# Patient Record
Sex: MALE | Race: WHITE | Employment: OTHER | URBAN - METROPOLITAN AREA
[De-identification: names, ages, dates, MRNs, and addresses within clinical notes are randomized per-mention and may not be internally consistent; named-entity substitution may affect disease eponyms.]

---

## 2017-08-31 PROBLEM — I10 ESSENTIAL HYPERTENSION, BENIGN: Status: ACTIVE | Noted: 2017-08-31

## 2017-08-31 PROBLEM — R06.09 DYSPNEA ON EXERTION: Status: ACTIVE | Noted: 2017-08-31

## 2017-08-31 PROBLEM — R07.9 CHEST PAIN, UNSPECIFIED: Status: ACTIVE | Noted: 2017-08-31

## 2017-12-01 PROBLEM — E66.01 OBESITY, MORBID (HCC): Status: ACTIVE | Noted: 2017-12-01

## 2018-04-19 ENCOUNTER — HOSPITAL ENCOUNTER (OUTPATIENT)
Age: 63
Setting detail: OBSERVATION
Discharge: HOME | DRG: 313 | End: 2018-04-20
Attending: INTERNAL MEDICINE | Admitting: INTERNAL MEDICINE
Payer: COMMERCIAL

## 2018-04-19 PROBLEM — M54.50 CHRONIC LOW BACK PAIN: Status: ACTIVE | Noted: 2018-04-19

## 2018-04-19 PROBLEM — R07.9 CHEST PAIN: Status: ACTIVE | Noted: 2018-04-19

## 2018-04-19 PROBLEM — G89.29 CHRONIC LOW BACK PAIN: Status: ACTIVE | Noted: 2018-04-19

## 2018-04-19 LAB
ATRIAL RATE: 76 BPM
CALCULATED P AXIS, ECG09: 18 DEGREES
CALCULATED R AXIS, ECG10: 55 DEGREES
CALCULATED T AXIS, ECG11: 61 DEGREES
D DIMER PPP FEU-MCNC: <0.27 UG/ML(FEU)
DIAGNOSIS, 93000: NORMAL
GLUCOSE BLD STRIP.AUTO-MCNC: 231 MG/DL (ref 65–100)
GLUCOSE BLD STRIP.AUTO-MCNC: 435 MG/DL (ref 65–100)
P-R INTERVAL, ECG05: 172 MS
Q-T INTERVAL, ECG07: 362 MS
QRS DURATION, ECG06: 88 MS
QTC CALCULATION (BEZET), ECG08: 407 MS
TROPONIN I SERPL-MCNC: <0.02 NG/ML (ref 0.02–0.05)
TROPONIN I SERPL-MCNC: <0.02 NG/ML (ref 0.02–0.05)
VENTRICULAR RATE, ECG03: 76 BPM

## 2018-04-19 PROCEDURE — 82962 GLUCOSE BLOOD TEST: CPT

## 2018-04-19 PROCEDURE — 99218 HC RM OBSERVATION: CPT

## 2018-04-19 PROCEDURE — 74011250637 HC RX REV CODE- 250/637: Performed by: NURSE PRACTITIONER

## 2018-04-19 PROCEDURE — 36415 COLL VENOUS BLD VENIPUNCTURE: CPT | Performed by: NURSE PRACTITIONER

## 2018-04-19 PROCEDURE — 85730 THROMBOPLASTIN TIME PARTIAL: CPT | Performed by: INTERNAL MEDICINE

## 2018-04-19 PROCEDURE — 74011636637 HC RX REV CODE- 636/637: Performed by: NURSE PRACTITIONER

## 2018-04-19 PROCEDURE — 83036 HEMOGLOBIN GLYCOSYLATED A1C: CPT | Performed by: INTERNAL MEDICINE

## 2018-04-19 PROCEDURE — 65660000000 HC RM CCU STEPDOWN

## 2018-04-19 PROCEDURE — 74011250636 HC RX REV CODE- 250/636: Performed by: NURSE PRACTITIONER

## 2018-04-19 PROCEDURE — 93005 ELECTROCARDIOGRAM TRACING: CPT | Performed by: NURSE PRACTITIONER

## 2018-04-19 PROCEDURE — 84484 ASSAY OF TROPONIN QUANT: CPT | Performed by: NURSE PRACTITIONER

## 2018-04-19 PROCEDURE — 85379 FIBRIN DEGRADATION QUANT: CPT | Performed by: NURSE PRACTITIONER

## 2018-04-19 RX ORDER — MORPHINE SULFATE 4 MG/ML
2 INJECTION, SOLUTION INTRAMUSCULAR; INTRAVENOUS
Status: DISCONTINUED | OUTPATIENT
Start: 2018-04-19 | End: 2018-04-20 | Stop reason: HOSPADM

## 2018-04-19 RX ORDER — LISINOPRIL 20 MG/1
20 TABLET ORAL DAILY
Status: DISCONTINUED | OUTPATIENT
Start: 2018-04-20 | End: 2018-04-20 | Stop reason: HOSPADM

## 2018-04-19 RX ORDER — HEPARIN SODIUM 5000 [USP'U]/100ML
12-25 INJECTION, SOLUTION INTRAVENOUS
Status: DISCONTINUED | OUTPATIENT
Start: 2018-04-19 | End: 2018-04-20 | Stop reason: HOSPADM

## 2018-04-19 RX ORDER — ATORVASTATIN CALCIUM 40 MG/1
40 TABLET, FILM COATED ORAL
Status: DISCONTINUED | OUTPATIENT
Start: 2018-04-19 | End: 2018-04-20 | Stop reason: HOSPADM

## 2018-04-19 RX ORDER — GABAPENTIN 300 MG/1
600 CAPSULE ORAL 4 TIMES DAILY
Status: DISCONTINUED | OUTPATIENT
Start: 2018-04-19 | End: 2018-04-19

## 2018-04-19 RX ORDER — TAMSULOSIN HYDROCHLORIDE 0.4 MG/1
0.4 CAPSULE ORAL DAILY
Status: DISCONTINUED | OUTPATIENT
Start: 2018-04-20 | End: 2018-04-20 | Stop reason: HOSPADM

## 2018-04-19 RX ORDER — HEPARIN SODIUM 5000 [USP'U]/ML
4000 INJECTION, SOLUTION INTRAVENOUS; SUBCUTANEOUS ONCE
Status: COMPLETED | OUTPATIENT
Start: 2018-04-19 | End: 2018-04-19

## 2018-04-19 RX ORDER — ZOLPIDEM TARTRATE 5 MG/1
5 TABLET ORAL
Status: DISCONTINUED | OUTPATIENT
Start: 2018-04-19 | End: 2018-04-20 | Stop reason: HOSPADM

## 2018-04-19 RX ORDER — METOPROLOL TARTRATE 25 MG/1
25 TABLET, FILM COATED ORAL EVERY 6 HOURS
Status: DISCONTINUED | OUTPATIENT
Start: 2018-04-19 | End: 2018-04-20 | Stop reason: HOSPADM

## 2018-04-19 RX ORDER — PANTOPRAZOLE SODIUM 40 MG/1
40 TABLET, DELAYED RELEASE ORAL DAILY
Status: DISCONTINUED | OUTPATIENT
Start: 2018-04-20 | End: 2018-04-20 | Stop reason: HOSPADM

## 2018-04-19 RX ORDER — METFORMIN HYDROCHLORIDE 1000 MG/1
1 TABLET, FILM COATED, EXTENDED RELEASE ORAL
COMMUNITY
End: 2019-03-14

## 2018-04-19 RX ORDER — SODIUM CHLORIDE 0.9 % (FLUSH) 0.9 %
5-10 SYRINGE (ML) INJECTION AS NEEDED
Status: DISCONTINUED | OUTPATIENT
Start: 2018-04-19 | End: 2018-04-20 | Stop reason: HOSPADM

## 2018-04-19 RX ORDER — GUAIFENESIN 100 MG/5ML
81 LIQUID (ML) ORAL DAILY
Status: DISCONTINUED | OUTPATIENT
Start: 2018-04-20 | End: 2018-04-20 | Stop reason: HOSPADM

## 2018-04-19 RX ORDER — SODIUM CHLORIDE 0.9 % (FLUSH) 0.9 %
5-10 SYRINGE (ML) INJECTION EVERY 8 HOURS
Status: DISCONTINUED | OUTPATIENT
Start: 2018-04-19 | End: 2018-04-20 | Stop reason: HOSPADM

## 2018-04-19 RX ORDER — GABAPENTIN 400 MG/1
800 CAPSULE ORAL 4 TIMES DAILY
Status: DISCONTINUED | OUTPATIENT
Start: 2018-04-19 | End: 2018-04-20 | Stop reason: HOSPADM

## 2018-04-19 RX ORDER — GLIPIZIDE 5 MG/1
10 TABLET ORAL
Status: DISCONTINUED | OUTPATIENT
Start: 2018-04-19 | End: 2018-04-20 | Stop reason: HOSPADM

## 2018-04-19 RX ORDER — TIZANIDINE 2 MG/1
4 TABLET ORAL
Status: DISCONTINUED | OUTPATIENT
Start: 2018-04-19 | End: 2018-04-20 | Stop reason: HOSPADM

## 2018-04-19 RX ORDER — NITROGLYCERIN 0.4 MG/1
0.4 TABLET SUBLINGUAL
Status: DISCONTINUED | OUTPATIENT
Start: 2018-04-19 | End: 2018-04-20 | Stop reason: HOSPADM

## 2018-04-19 RX ORDER — INSULIN LISPRO 100 [IU]/ML
INJECTION, SOLUTION INTRAVENOUS; SUBCUTANEOUS
Status: DISCONTINUED | OUTPATIENT
Start: 2018-04-19 | End: 2018-04-20 | Stop reason: HOSPADM

## 2018-04-19 RX ORDER — GUAIFENESIN 100 MG/5ML
81 LIQUID (ML) ORAL DAILY
Status: DISCONTINUED | OUTPATIENT
Start: 2018-04-20 | End: 2018-04-19

## 2018-04-19 RX ORDER — MORPHINE SULFATE 4 MG/ML
2 INJECTION, SOLUTION INTRAMUSCULAR; INTRAVENOUS
Status: DISCONTINUED | OUTPATIENT
Start: 2018-04-19 | End: 2018-04-19 | Stop reason: SDUPTHER

## 2018-04-19 RX ORDER — SODIUM CHLORIDE 9 MG/ML
75 INJECTION, SOLUTION INTRAVENOUS CONTINUOUS
Status: DISCONTINUED | OUTPATIENT
Start: 2018-04-19 | End: 2018-04-20 | Stop reason: HOSPADM

## 2018-04-19 RX ORDER — AMLODIPINE BESYLATE 5 MG/1
5 TABLET ORAL
Status: DISCONTINUED | OUTPATIENT
Start: 2018-04-20 | End: 2018-04-20 | Stop reason: HOSPADM

## 2018-04-19 RX ORDER — ZOLPIDEM TARTRATE 10 MG/1
10 TABLET ORAL
COMMUNITY
End: 2020-03-12 | Stop reason: SDUPTHER

## 2018-04-19 RX ORDER — OXYCODONE HYDROCHLORIDE 15 MG/1
30 TABLET ORAL
Status: DISCONTINUED | OUTPATIENT
Start: 2018-04-19 | End: 2018-04-20 | Stop reason: HOSPADM

## 2018-04-19 RX ORDER — INSULIN LISPRO 100 [IU]/ML
5 INJECTION, SOLUTION INTRAVENOUS; SUBCUTANEOUS ONCE
Status: COMPLETED | OUTPATIENT
Start: 2018-04-19 | End: 2018-04-19

## 2018-04-19 RX ORDER — ESZOPICLONE 3 MG/1
3 TABLET, FILM COATED ORAL
Status: DISCONTINUED | OUTPATIENT
Start: 2018-04-19 | End: 2018-04-19

## 2018-04-19 RX ORDER — ALBUTEROL SULFATE 90 UG/1
1 AEROSOL, METERED RESPIRATORY (INHALATION)
Status: DISCONTINUED | OUTPATIENT
Start: 2018-04-19 | End: 2018-04-20 | Stop reason: HOSPADM

## 2018-04-19 RX ADMIN — ATORVASTATIN CALCIUM 40 MG: 40 TABLET, FILM COATED ORAL at 21:44

## 2018-04-19 RX ADMIN — INSULIN LISPRO 5 UNITS: 100 INJECTION, SOLUTION INTRAVENOUS; SUBCUTANEOUS at 23:03

## 2018-04-19 RX ADMIN — Medication 10 ML: at 15:50

## 2018-04-19 RX ADMIN — Medication 10 ML: at 15:21

## 2018-04-19 RX ADMIN — HEPARIN SODIUM 4000 UNITS: 5000 INJECTION, SOLUTION INTRAVENOUS; SUBCUTANEOUS at 17:36

## 2018-04-19 RX ADMIN — GABAPENTIN 600 MG: 300 CAPSULE ORAL at 17:27

## 2018-04-19 RX ADMIN — NITROGLYCERIN 1 INCH: 20 OINTMENT TOPICAL at 23:04

## 2018-04-19 RX ADMIN — OXYCODONE HYDROCHLORIDE 30 MG: 15 TABLET ORAL at 23:03

## 2018-04-19 RX ADMIN — OXYCODONE HYDROCHLORIDE 30 MG: 15 TABLET ORAL at 17:24

## 2018-04-19 RX ADMIN — HEPARIN SODIUM AND DEXTROSE 12 UNITS/KG/HR: 5000; 5 INJECTION INTRAVENOUS at 17:43

## 2018-04-19 RX ADMIN — INSULIN LISPRO 4 UNITS: 100 INJECTION, SOLUTION INTRAVENOUS; SUBCUTANEOUS at 18:26

## 2018-04-19 RX ADMIN — Medication 5 ML: at 21:45

## 2018-04-19 RX ADMIN — METOPROLOL TARTRATE 25 MG: 25 TABLET ORAL at 17:27

## 2018-04-19 RX ADMIN — SODIUM CHLORIDE 75 ML/HR: 900 INJECTION, SOLUTION INTRAVENOUS at 15:16

## 2018-04-19 RX ADMIN — ZOLPIDEM TARTRATE 5 MG: 5 TABLET ORAL at 23:03

## 2018-04-19 RX ADMIN — INSULIN LISPRO 10 UNITS: 100 INJECTION, SOLUTION INTRAVENOUS; SUBCUTANEOUS at 23:04

## 2018-04-19 RX ADMIN — NITROGLYCERIN 1 INCH: 20 OINTMENT TOPICAL at 17:32

## 2018-04-19 RX ADMIN — METOPROLOL TARTRATE 25 MG: 25 TABLET ORAL at 23:03

## 2018-04-19 RX ADMIN — GABAPENTIN 800 MG: 400 CAPSULE ORAL at 21:44

## 2018-04-19 NOTE — PROGRESS NOTES
TRANSFER - IN REPORT    Direct admit to 320. Patient connected to monitor and assessment completed. Plan of care reviewed. Patient oriented to room and call light. Patient aware to use call light to communicate any chest pain or needs. Admission skin assessment completed with second RN and reveals the following: skin intact to sacrum and heels bilaterally without open areas or redness.

## 2018-04-19 NOTE — IP AVS SNAPSHOT
[address] 
[phone] Patient: Sherry Rodríguez MRN: [id number] TKN:9/78/2301 A check dante indicates which time of day the medication should be taken. My Medications CHANGE how you take these medications Instructions Each Dose to Equal  
 Morning Noon Evening Bedtime  
 pantoprazole 40 mg tablet Commonly known as:  PROTONIX What changed:  when to take this Take 1 Tab by mouth daily. 40 mg  
    
  
   
   
   
  
 tamsulosin 0.4 mg capsule Commonly known as:  FLOMAX What changed:  See the new instructions. Your next dose is:  4- TAKE 1 CAPSULE DAILY CONTINUE taking these medications Instructions Each Dose to Equal  
 Morning Noon Evening Bedtime  
 albuterol 90 mcg/actuation inhaler Commonly known as:  PROVENTIL HFA, VENTOLIN HFA, PROAIR HFA Take 1 Puff by inhalation every four (4) hours as needed for Wheezing. 1 Puff AMBIEN 10 mg tablet Generic drug:  zolpidem Take 10 mg by mouth nightly. Per patient statement. 10 mg  
    
   
   
   
  
  
 amLODIPine 5 mg tablet Commonly known as:  Eleanor Bowman Take 5 mg by mouth every morning. Take day of surgery per anesthesia protocol. Indications: HYPERTENSION  
 5 mg  
    
  
   
   
   
  
 atorvastatin 40 mg tablet Commonly known as:  LIPITOR Take 20 mg by mouth daily. 20 mg  
    
   
   
   
  
  
 eszopiclone 3 mg tablet Commonly known as:  Oley Erm Take 3 mg by mouth daily. 3 mg FISH OIL CONCENTRATE PO Take 1 Cap by mouth every morning. Stop seven days prior to surgery per anesthesia protocol. 1 Cap  
    
   
   
   
  
 gabapentin 300 mg capsule Commonly known as:  NEURONTIN Take 800 mg by mouth four (4) times daily.   
 800 mg  
    
  
   
  
   
  
   
  
  
 glipiZIDE 10 mg tablet Commonly known as:  Raj Gauze Take 10 mg by mouth daily. 10 mg  
    
  
   
   
   
  
 hydroCHLOROthiazide 12.5 mg tablet Commonly known as:  HYDRODIURIL Take 12.5 mg by mouth daily. 12.5 mg  
    
  
   
   
   
  
 lisinopril 20 mg tablet Commonly known as:  Debi Desanctis Take 20 mg by mouth daily. Indications: hypertension 20 mg  
    
  
   
   
   
  
 metFORMIN 1,000 mg Tg24 24 hour tablet Commonly known as:  Malika Mckinney Your next dose is:  4-22- 2018 Take 1 Tab by mouth Daily (before dinner). 1 Tab MULTIPLE VITAMIN PO Take 1 Tab by mouth daily. 1 Tab  
    
   
   
   
  
 oxyCODONE IR 20 mg immediate release tablet Commonly known as:  Ahsan Espinal Your last dose was:  4- at 3:34 Take 30 mg by mouth every six (6) hours as needed. 30 mg  
    
   
   
   
  
  
 vitamin e 400 unit Tab Take 1 Tab by mouth every morning. Stop seven days prior to surgery per anesthesia protocol. 1 Tab

## 2018-04-19 NOTE — PROGRESS NOTES
Called pharmacy to get  Medication approved to start heaprin gtt. Spoke to Oony. Awaiting pharmacy to approve medications.

## 2018-04-19 NOTE — PROGRESS NOTES
Bedside and verbal shift report given to Levi Vargas RN by Dominica Severe, RN. Opportunity for questions given. Oncoming RN assumes all patient care.

## 2018-04-19 NOTE — IP AVS SNAPSHOT
[address] 
[phone] Patient: Geovani Cruz MRN: [id number] GWE:[id number]/[id number] About your hospitalization You were admitted on:  April 19, 2018 You last received care in the:  [facility] 3 TELEMETRY You were discharged on:  April 20, 2018 Why you were hospitalized Your primary diagnosis was:  Chest Pain, Unspecified Your diagnoses also included:  Obesity (Bmi 30-39.9), Gerd (Gastroesophageal Reflux Disease), Type 2 Diabetes Mellitus (Hcc), High Cholesterol, Essential Hypertension, Benign, Chronic Low Back Pain, Chest Pain Follow-up Information Follow up With Details Comments Contact Info Kayla Ahumada MD Schedule an appointment as soon as possible for a visit in 2 weeks  [address] 
[phone] Discharge Orders None A check dante indicates which time of day the medication should be taken. My Medications CHANGE how you take these medications Instructions Each Dose to Equal  
 Morning Noon Evening Bedtime  
 pantoprazole 40 mg tablet Commonly known as:  PROTONIX What changed:  when to take this Take 1 Tab by mouth daily. 40 mg  
    
  
   
   
   
  
 tamsulosin 0.4 mg capsule Commonly known as:  FLOMAX What changed:  See the new instructions. Your next dose is:  4- TAKE 1 CAPSULE DAILY CONTINUE taking these medications Instructions Each Dose to Equal  
 Morning Noon Evening Bedtime  
 albuterol 90 mcg/actuation inhaler Commonly known as:  PROVENTIL HFA, VENTOLIN HFA, PROAIR HFA Take 1 Puff by inhalation every four (4) hours as needed for Wheezing. 1 Puff AMBIEN 10 mg tablet Generic drug:  zolpidem Take 10 mg by mouth nightly. Per patient statement. 10 mg  
    
   
   
   
  
  
 amLODIPine 5 mg tablet Commonly known as:  Sara Pall Take 5 mg by mouth every morning. Take day of surgery per anesthesia protocol. Indications: HYPERTENSION  
 5 mg  
    
  
   
   
   
  
 atorvastatin 40 mg tablet Commonly known as:  LIPITOR Take 20 mg by mouth daily. 20 mg  
    
   
   
   
  
  
 eszopiclone 3 mg tablet Commonly known as:  James Bear Take 3 mg by mouth daily. 3 mg FISH OIL CONCENTRATE PO Take 1 Cap by mouth every morning. Stop seven days prior to surgery per anesthesia protocol. 1 Cap  
    
   
   
   
  
 gabapentin 300 mg capsule Commonly known as:  NEURONTIN Take 800 mg by mouth four (4) times daily. 800 mg  
    
  
   
  
   
  
   
  
  
 glipiZIDE 10 mg tablet Commonly known as:  Aldean Bina Take 10 mg by mouth daily. 10 mg  
    
  
   
   
   
  
 hydroCHLOROthiazide 12.5 mg tablet Commonly known as:  HYDRODIURIL Take 12.5 mg by mouth daily. 12.5 mg  
    
  
   
   
   
  
 lisinopril 20 mg tablet Commonly known as:  Helon Estrin Take 20 mg by mouth daily. Indications: hypertension 20 mg  
    
  
   
   
   
  
 metFORMIN 1,000 mg Tg24 24 hour tablet Commonly known as:  Jordyn Linen Your next dose is:  4-22- 2018 Take 1 Tab by mouth Daily (before dinner). 1 Tab MULTIPLE VITAMIN PO Take 1 Tab by mouth daily. 1 Tab  
    
   
   
   
  
 oxyCODONE IR 20 mg immediate release tablet Commonly known as:  Juan Carlos Melena Your last dose was:  4- at 3:34 Take 30 mg by mouth every six (6) hours as needed. 30 mg  
    
   
   
   
  
  
 vitamin e 400 unit Tab Take 1 Tab by mouth every morning. Stop seven days prior to surgery per anesthesia protocol. 1 Tab Opioid Education  Prescription Opioids: What You Need to Know: 
 
Prescription opioids can be used to help relieve moderate-to-severe pain and are often prescribed following a surgery or injury, or for certain health conditions. These medications can be an important part of treatment but also come with serious risks. Opioids are strong pain medicines. Examples include hydrocodone, oxycodone, fentanyl, and morphine. Heroin is an example of an illegal opioid. It is important to work with your health care provider to make sure you are getting the safest, most effective care. WHAT ARE THE RISKS AND SIDE EFFECTS OF OPIOID USE? Prescription opioids carry serious risks of addiction and overdose, especially with prolonged use. An opioid overdose, often marked by slow breathing, can cause sudden death. The use of prescription opioids can have a number of side effects as well, even when taken as directed. · Tolerance-meaning you might need to take more of a medication for the same pain relief · Physical dependence-meaning you have symptoms of withdrawal when the medication is stopped. Withdrawal symptoms can include nausea, sweating, chills, diarrhea, stomach cramps, and muscle aches. Withdrawal can last up to several weeks, depending on which drug you took and how long you took it. · Increased sensitivity to pain · Constipation · Nausea, vomiting, and dry mouth · Sleepiness and dizziness · Confusion · Depression · Low levels of testosterone that can result in lower sex drive, energy, and strength · Itching and sweating RISKS ARE GREATER WITH:      
· History of drug misuse, substance use disorder, or overdose · Mental health conditions (such as depression or anxiety) · Sleep apnea · Older age (72 years or older) · Pregnancy Avoid alcohol while taking prescription opioids. Also, unless specifically advised by your health care provider, medications to avoid include: · Benzodiazepines (such as Xanax or Valium) · Muscle relaxants (such as Soma or Flexeril) · Hypnotics (such as Ambien or Lunesta) · Other prescription opioids KNOW YOUR OPTIONS Talk to your health care provider about ways to manage your pain that don't involve prescription opioids. Some of these options may actually work better and have fewer risks and side effects. Options may include: 
· Pain relievers such as acetaminophen, ibuprofen, and naproxen · Some medications that are also used for depression or seizures · Physical therapy and exercise · Counseling to help patients learn how to cope better with triggers of pain and stress. · Application of heat or cold compress · Massage therapy · Relaxation techniques Be Informed Make sure you know the name of your medication, how much and how often to take it, and its potential risks & side effects. IF YOU ARE PRESCRIBED OPIOIDS FOR PAIN: 
· Never take opioids in greater amounts or more often than prescribed. Remember the goal is not to be pain-free but to manage your pain at a tolerable level. · Follow up with your primary care provider to: · Work together to create a plan on how to manage your pain. · Talk about ways to help manage your pain that don't involve prescription opioids. · Talk about any and all concerns and side effects. · Help prevent misuse and abuse. · Never sell or share prescription opioids · Help prevent misuse and abuse. · Store prescription opioids in a secure place and out of reach of others (this may include visitors, children, friends, and family). · Safely dispose of unused/unwanted prescription opioids: Find your community drug take-back program or your pharmacy mail-back program, or flush them down the toilet, following guidance from the Food and Drug Administration (www.fda.gov/Drugs/ResourcesForYou). · Visit www.cdc.gov/drugoverdose to learn about the risks of opioid abuse and overdose. · If you believe you may be struggling with addiction, tell your health care provider and ask for guidance or call [employer] at 1-397-326-FLKU. Discharge Instructions Cardiac Catheterization/Angiography Discharge Instructions *Check the puncture site frequently for swelling or bleeding. If you see any bleeding, lie down and apply pressure over the area with a clean town or washcloth. Notify your doctor for any redness, swelling, drainage or oozing from the puncture site. Notify your doctor for any fever or chills. *If the leg or arm with the puncture becomes cold, numb or painful, [facility] at  [phone]. *Activity should be limited for the next 48 hours. Climb stairs as little as possible and avoid any stooping, bending or strenuous activity for 48 hours. No heavy lifting (anything over 10 pounds) for three days. *Do not drive for 48 hours. *You may resume your usual diet. Drink more fluids than usual. 
 
*Have a responsible person drive you home and stay with you for at least 24 hours after your heart catheterization/angiography. *You may remove the bandage from your Right and Arm in 24 hours. You may shower in 24 hours. No tub baths, hot tubs or swimming for one week. Do not place any lotions, creams, powders, ointments over the puncture site for one week. You may place a clean band-aid over the puncture site each day for 5 days. Change this daily. [employer] Announcement We are excited to announce that we are making your provider's discharge notes available to you in Peak[employer]. You will see these notes when they are completed and signed by the physician that discharged you from your recent hospital stay. If you have any questions or concerns about any information you see in PeakStream, please call the Health Information Department where you were seen or reach out to your Primary Care Provider for more information about your plan of care. Introducing \[facility]\"" & HEALTH SERVICES! Yessy Heller introduces Wantr patient portal. Now you can access parts of your medical record, email your doctor's office, and request medication refills online. 1. In your internet browser, go to [email or web address] 2. Click on the First Time User? Click Here link in the Sign In box. You will see the New Member Sign Up page. 3. Enter your Wantr Access Code exactly as it appears below. You will not need to use this code after youve completed the sign-up process. If you do not sign up before the expiration date, you must request a new code. · Wantr Access Code: [email or web address] Expires: 7/18/2018  9:15 AM 
 
4. Enter the last four digits of your Social Security Number (xxxx) and Date of Birth (mm/dd/yyyy) as indicated and click Submit. You will be taken to the next sign-up page. 5. Create a Wantr ID. This will be your Wantr login ID and cannot be changed, so think of one that is secure and easy to remember. 6. Create a Wantr password. You can change your password at any time. 7. Enter your Password Reset Question and Answer. This can be used at a later time if you forget your password. 8. Enter your e-mail address. You will receive e-mail notification when new information is available in [address] 9. Click Sign Up. You can now view and download portions of your medical record. 10. Click the Download Summary menu link to download a portable copy of your medical information. If you have questions, please visit the Frequently Asked Questions section of the Wantr website. Remember, Wantr is NOT to be used for urgent needs. For medical emergencies, dial 911. Now available from your iPhone and Android! Introducing Moi Arce As a Yessy Heller patient, I wanted to make you aware of our electronic visit tool called Moi Arce. Yessy Heller 24/7 allows you to connect within minutes with a medical provider 24 hours a day, seven days a week via a mobile device or tablet or logging into a secure website from your computer. You can access [employer] from anywhere in the [place]. A virtual visit might be right for you when you have a simple condition and feel like you just dont want to get out of bed, or cant get away from work for an appointment, when your regular [facility] provider is not available (evenings, weekends or holidays), or when youre out of town and need minor care. Electronic visits cost only $49 and if the [employer] 24/7 provider determines a prescription is needed to treat your condition, one can be electronically transmitted to a nearby pharmacy*. Please take a moment to enroll today if you have not already done so. The enrollment process is free and takes just a few minutes. To enroll, please download the [employer] hilda to your tablet or phone, or visit [email or web address] to enroll on your computer. And, as an [address] [place] [address] patient with a [employer] account, the results of your visits will be scanned into your electronic medical record and your primary care provider will be able to view the scanned results. We urge you to continue to see your regular [facility] provider for your ongoing medical care. And while your primary care provider may not be the one available when you seek a Moi Matthewagustinafin virtual visit, the peace of mind you get from getting a real diagnosis real time can be priceless. For more information on AddressHealthagustinafin, view our Frequently Asked Questions (FAQs) at [email or web address]. Sincerely, 
 
Efrain Rodney MD 
Chief Medical Officer [address] *:  certain medications cannot be prescribed via [facility]i [employer] Providers Seen During Your Hospitalization Provider Specialty Primary office phone Juan Blank MD Cardiology [phone] Your Primary Care Physician (PCP) Primary Care Physician Office Phone Office Fax Stacy Méndez [phone] You are allergic to the following No active allergies Recent Documentation Height Weight BMI Smoking Status 1.778 m 129.1 kg 40.85 kg/m2 Former Smoker Emergency Contacts Name Discharge Info Relation Home Work Mobile Brittney Johnson  Other Relative [6] [phone] Jonathan Doan [5] [phone] Patient Belongings The following personal items are in your possession at time of discharge: 
  Dental Appliances: Uppers, With patient  Visual Aid: Glasses, With patient, At bedside      Home Medications: None   Jewelry: None  Clothing: At bedside    Other Valuables: Cell Phone, Eyeglasses Please provide this summary of care documentation to your next provider. Signatures-by signing, you are acknowledging that this After Visit Summary has been reviewed with you and you have received a copy. Patient Signature:  ____________________________________________________________ Date:  ____________________________________________________________  
  
[facility] Provider Signature:  ____________________________________________________________ Date:  ____________________________________________________________

## 2018-04-20 ENCOUNTER — APPOINTMENT (OUTPATIENT)
Dept: CT IMAGING | Age: 63
DRG: 313 | End: 2018-04-20
Attending: INTERNAL MEDICINE
Payer: COMMERCIAL

## 2018-04-20 VITALS
TEMPERATURE: 97.8 F | HEIGHT: 70 IN | HEART RATE: 84 BPM | BODY MASS INDEX: 40.76 KG/M2 | WEIGHT: 284.7 LBS | RESPIRATION RATE: 11 BRPM | SYSTOLIC BLOOD PRESSURE: 122 MMHG | DIASTOLIC BLOOD PRESSURE: 75 MMHG | OXYGEN SATURATION: 96 %

## 2018-04-20 LAB
ANION GAP SERPL CALC-SCNC: 8 MMOL/L (ref 7–16)
APTT PPP: 32.5 SEC (ref 23.2–35.3)
APTT PPP: 35 SEC (ref 23.2–35.3)
APTT PPP: 39.7 SEC (ref 23.2–35.3)
BASOPHILS # BLD: 0.1 K/UL (ref 0–0.2)
BASOPHILS NFR BLD: 1 % (ref 0–2)
BUN SERPL-MCNC: 11 MG/DL (ref 8–23)
CALCIUM SERPL-MCNC: 8.5 MG/DL (ref 8.3–10.4)
CHLORIDE SERPL-SCNC: 103 MMOL/L (ref 98–107)
CHOLEST SERPL-MCNC: 130 MG/DL
CO2 SERPL-SCNC: 30 MMOL/L (ref 21–32)
CREAT SERPL-MCNC: 0.79 MG/DL (ref 0.8–1.5)
DIFFERENTIAL METHOD BLD: ABNORMAL
EOSINOPHIL # BLD: 0.2 K/UL (ref 0–0.8)
EOSINOPHIL NFR BLD: 3 % (ref 0.5–7.8)
ERYTHROCYTE [DISTWIDTH] IN BLOOD BY AUTOMATED COUNT: 12.9 % (ref 11.9–14.6)
EST. AVERAGE GLUCOSE BLD GHB EST-MCNC: 235 MG/DL
GLUCOSE BLD STRIP.AUTO-MCNC: 256 MG/DL (ref 65–100)
GLUCOSE BLD STRIP.AUTO-MCNC: 269 MG/DL (ref 65–100)
GLUCOSE SERPL-MCNC: 248 MG/DL (ref 65–100)
HBA1C MFR BLD: 9.8 % (ref 4.8–6)
HCT VFR BLD AUTO: 39.7 % (ref 41.1–50.3)
HDLC SERPL-MCNC: 32 MG/DL (ref 40–60)
HDLC SERPL: 4.1 {RATIO}
HGB BLD-MCNC: 13.3 G/DL (ref 13.6–17.2)
IMM GRANULOCYTES # BLD: 0 K/UL (ref 0–0.5)
IMM GRANULOCYTES NFR BLD AUTO: 0 % (ref 0–5)
LDLC SERPL CALC-MCNC: 29.2 MG/DL
LIPID PROFILE,FLP: ABNORMAL
LYMPHOCYTES # BLD: 2.4 K/UL (ref 0.5–4.6)
LYMPHOCYTES NFR BLD: 35 % (ref 13–44)
MCH RBC QN AUTO: 29.1 PG (ref 26.1–32.9)
MCHC RBC AUTO-ENTMCNC: 33.5 G/DL (ref 31.4–35)
MCV RBC AUTO: 86.9 FL (ref 79.6–97.8)
MONOCYTES # BLD: 0.5 K/UL (ref 0.1–1.3)
MONOCYTES NFR BLD: 6 % (ref 4–12)
NEUTS SEG # BLD: 3.8 K/UL (ref 1.7–8.2)
NEUTS SEG NFR BLD: 55 % (ref 43–78)
PLATELET # BLD AUTO: 212 K/UL (ref 150–450)
PMV BLD AUTO: 11.4 FL (ref 10.8–14.1)
POTASSIUM SERPL-SCNC: 3.9 MMOL/L (ref 3.5–5.1)
RBC # BLD AUTO: 4.57 M/UL (ref 4.23–5.67)
SODIUM SERPL-SCNC: 141 MMOL/L (ref 136–145)
TRIGL SERPL-MCNC: 344 MG/DL (ref 35–150)
TROPONIN I SERPL-MCNC: <0.02 NG/ML (ref 0.02–0.05)
VLDLC SERPL CALC-MCNC: 68.8 MG/DL (ref 6–23)
WBC # BLD AUTO: 7 K/UL (ref 4.3–11.1)

## 2018-04-20 PROCEDURE — 93458 L HRT ARTERY/VENTRICLE ANGIO: CPT

## 2018-04-20 PROCEDURE — 99153 MOD SED SAME PHYS/QHP EA: CPT

## 2018-04-20 PROCEDURE — 85025 COMPLETE CBC W/AUTO DIFF WBC: CPT | Performed by: NURSE PRACTITIONER

## 2018-04-20 PROCEDURE — 74011250637 HC RX REV CODE- 250/637: Performed by: NURSE PRACTITIONER

## 2018-04-20 PROCEDURE — 71260 CT THORAX DX C+: CPT

## 2018-04-20 PROCEDURE — 74011636320 HC RX REV CODE- 636/320: Performed by: INTERNAL MEDICINE

## 2018-04-20 PROCEDURE — 74011250636 HC RX REV CODE- 250/636: Performed by: NURSE PRACTITIONER

## 2018-04-20 PROCEDURE — 74011000250 HC RX REV CODE- 250: Performed by: INTERNAL MEDICINE

## 2018-04-20 PROCEDURE — C1894 INTRO/SHEATH, NON-LASER: HCPCS

## 2018-04-20 PROCEDURE — C8929 TTE W OR WO FOL WCON,DOPPLER: HCPCS

## 2018-04-20 PROCEDURE — 74011000258 HC RX REV CODE- 258: Performed by: INTERNAL MEDICINE

## 2018-04-20 PROCEDURE — 74011250636 HC RX REV CODE- 250/636: Performed by: INTERNAL MEDICINE

## 2018-04-20 PROCEDURE — 99218 HC RM OBSERVATION: CPT

## 2018-04-20 PROCEDURE — 77030019569 HC BND COMPR RAD TERU -B

## 2018-04-20 PROCEDURE — 74011636637 HC RX REV CODE- 636/637: Performed by: NURSE PRACTITIONER

## 2018-04-20 PROCEDURE — 85730 THROMBOPLASTIN TIME PARTIAL: CPT | Performed by: INTERNAL MEDICINE

## 2018-04-20 PROCEDURE — 77030004534 HC CATH ANGI DX INFN CARD -A

## 2018-04-20 PROCEDURE — 36415 COLL VENOUS BLD VENIPUNCTURE: CPT | Performed by: NURSE PRACTITIONER

## 2018-04-20 PROCEDURE — 99152 MOD SED SAME PHYS/QHP 5/>YRS: CPT

## 2018-04-20 PROCEDURE — C1769 GUIDE WIRE: HCPCS

## 2018-04-20 PROCEDURE — 80061 LIPID PANEL: CPT | Performed by: NURSE PRACTITIONER

## 2018-04-20 PROCEDURE — 80048 BASIC METABOLIC PNL TOTAL CA: CPT | Performed by: NURSE PRACTITIONER

## 2018-04-20 PROCEDURE — 74011250636 HC RX REV CODE- 250/636

## 2018-04-20 PROCEDURE — 77030015766

## 2018-04-20 PROCEDURE — 82962 GLUCOSE BLOOD TEST: CPT

## 2018-04-20 RX ORDER — MIDAZOLAM HYDROCHLORIDE 1 MG/ML
.5-2 INJECTION, SOLUTION INTRAMUSCULAR; INTRAVENOUS
Status: DISCONTINUED | OUTPATIENT
Start: 2018-04-20 | End: 2018-04-20 | Stop reason: HOSPADM

## 2018-04-20 RX ORDER — SODIUM CHLORIDE 0.9 % (FLUSH) 0.9 %
10 SYRINGE (ML) INJECTION
Status: COMPLETED | OUTPATIENT
Start: 2018-04-20 | End: 2018-04-20

## 2018-04-20 RX ORDER — SODIUM CHLORIDE 0.9 % (FLUSH) 0.9 %
5-10 SYRINGE (ML) INJECTION AS NEEDED
Status: DISCONTINUED | OUTPATIENT
Start: 2018-04-20 | End: 2018-04-20 | Stop reason: HOSPADM

## 2018-04-20 RX ORDER — SODIUM CHLORIDE 9 MG/ML
75 INJECTION, SOLUTION INTRAVENOUS CONTINUOUS
Status: DISCONTINUED | OUTPATIENT
Start: 2018-04-20 | End: 2018-04-20 | Stop reason: HOSPADM

## 2018-04-20 RX ORDER — SODIUM CHLORIDE 0.9 % (FLUSH) 0.9 %
5-10 SYRINGE (ML) INJECTION EVERY 8 HOURS
Status: DISCONTINUED | OUTPATIENT
Start: 2018-04-20 | End: 2018-04-20 | Stop reason: HOSPADM

## 2018-04-20 RX ORDER — FENTANYL CITRATE 50 UG/ML
25-75 INJECTION, SOLUTION INTRAMUSCULAR; INTRAVENOUS
Status: DISCONTINUED | OUTPATIENT
Start: 2018-04-20 | End: 2018-04-20 | Stop reason: HOSPADM

## 2018-04-20 RX ORDER — HEPARIN SODIUM 200 [USP'U]/100ML
3 INJECTION, SOLUTION INTRAVENOUS CONTINUOUS
Status: DISCONTINUED | OUTPATIENT
Start: 2018-04-20 | End: 2018-04-20 | Stop reason: HOSPADM

## 2018-04-20 RX ORDER — HEPARIN SODIUM 5000 [USP'U]/ML
35 INJECTION, SOLUTION INTRAVENOUS; SUBCUTANEOUS ONCE
Status: COMPLETED | OUTPATIENT
Start: 2018-04-20 | End: 2018-04-20

## 2018-04-20 RX ORDER — LIDOCAINE HYDROCHLORIDE 20 MG/ML
2-10 INJECTION, SOLUTION INFILTRATION; PERINEURAL
Status: DISCONTINUED | OUTPATIENT
Start: 2018-04-20 | End: 2018-04-20 | Stop reason: HOSPADM

## 2018-04-20 RX ADMIN — LISINOPRIL 20 MG: 20 TABLET ORAL at 08:15

## 2018-04-20 RX ADMIN — PERFLUTREN 1 ML: 6.52 INJECTION, SUSPENSION INTRAVENOUS at 09:00

## 2018-04-20 RX ADMIN — SODIUM CHLORIDE 75 ML/HR: 900 INJECTION, SOLUTION INTRAVENOUS at 05:43

## 2018-04-20 RX ADMIN — Medication 10 ML: at 15:37

## 2018-04-20 RX ADMIN — NITROGLYCERIN 1 INCH: 20 OINTMENT TOPICAL at 05:43

## 2018-04-20 RX ADMIN — GABAPENTIN 800 MG: 400 CAPSULE ORAL at 08:14

## 2018-04-20 RX ADMIN — METOPROLOL TARTRATE 25 MG: 25 TABLET ORAL at 05:43

## 2018-04-20 RX ADMIN — OXYCODONE HYDROCHLORIDE 30 MG: 15 TABLET ORAL at 15:34

## 2018-04-20 RX ADMIN — HEPARIN SODIUM 4550 UNITS: 5000 INJECTION, SOLUTION INTRAVENOUS; SUBCUTANEOUS at 02:14

## 2018-04-20 RX ADMIN — Medication 10 ML: at 15:03

## 2018-04-20 RX ADMIN — FENTANYL CITRATE 25 MCG: 50 INJECTION, SOLUTION INTRAMUSCULAR; INTRAVENOUS at 13:14

## 2018-04-20 RX ADMIN — SODIUM CHLORIDE 100 ML: 900 INJECTION, SOLUTION INTRAVENOUS at 15:03

## 2018-04-20 RX ADMIN — PANTOPRAZOLE SODIUM 40 MG: 40 TABLET, DELAYED RELEASE ORAL at 08:15

## 2018-04-20 RX ADMIN — VERAPAMIL HYDROCHLORIDE 2 ML: 2.5 INJECTION INTRAVENOUS at 13:25

## 2018-04-20 RX ADMIN — GABAPENTIN 800 MG: 400 CAPSULE ORAL at 15:34

## 2018-04-20 RX ADMIN — ASPIRIN 81 MG 81 MG: 81 TABLET ORAL at 08:15

## 2018-04-20 RX ADMIN — IOPAMIDOL 100 ML: 755 INJECTION, SOLUTION INTRAVENOUS at 13:37

## 2018-04-20 RX ADMIN — AMLODIPINE BESYLATE 5 MG: 5 TABLET ORAL at 08:15

## 2018-04-20 RX ADMIN — LIDOCAINE HYDROCHLORIDE 60 MG: 20 INJECTION, SOLUTION INFILTRATION; PERINEURAL at 13:23

## 2018-04-20 RX ADMIN — Medication 10 ML: at 05:47

## 2018-04-20 RX ADMIN — HEPARIN SODIUM 3 UNITS/HR: 5000 INJECTION, SOLUTION INTRAVENOUS; SUBCUTANEOUS at 13:08

## 2018-04-20 RX ADMIN — GLIPIZIDE 10 MG: 5 TABLET ORAL at 15:43

## 2018-04-20 RX ADMIN — MIDAZOLAM HYDROCHLORIDE 2 MG: 1 INJECTION, SOLUTION INTRAMUSCULAR; INTRAVENOUS at 13:14

## 2018-04-20 RX ADMIN — OXYCODONE HYDROCHLORIDE 30 MG: 15 TABLET ORAL at 06:25

## 2018-04-20 RX ADMIN — INSULIN LISPRO 6 UNITS: 100 INJECTION, SOLUTION INTRAVENOUS; SUBCUTANEOUS at 15:43

## 2018-04-20 RX ADMIN — IOPAMIDOL 100 ML: 755 INJECTION, SOLUTION INTRAVENOUS at 15:03

## 2018-04-20 NOTE — PROCEDURES
Brief Cardiac Procedure Note    Patient: Izaiah Fritz MRN: [id number]  SSN: [id number]    YOB: 1955  Age: 66 y.o.   Sex: male      Date of Procedure: 4/20/2018     Pre-procedure Diagnosis: Chest pain CCS Class IV    Post-procedure Diagnosis: Coronary Artery Disease    Reason for Procedure: Suspected CAD    Procedure: Left Heart Catheterization    Brief Description of Procedure: LHC    Performed By: Eugenia Berry MD     Assistants: NONE    Anesthesia: Moderate Sedation    Estimated Blood Loss: Less than 10 mL      Specimens: None    Implants: None    Findings:   LV NORMAL  CORS MINIMAL IRREGS AT MOST  RIGHT RADIAL    Complications: None    Recommendations: CONTINUE BID PPI AND KEEP GI FOLLOW UP    Signed By: Eugenia Berry MD     April 20, 2018

## 2018-04-20 NOTE — PROGRESS NOTES
4/20/2018            RE: Cristela. [address]              To Whom It May Concern,      Due to medical reasons, Sesar Moeller may  return to full duty, no restrictions on Monday April 23rd, 2018.         Sincerely,          Josy Spencer NP

## 2018-04-20 NOTE — PROCEDURES
[address] KIMMY  MR#: [id number]  : 1955  ACCOUNT #: [de-identified]   DATE OF SERVICE: 2018    PRIMARY CARE PHYSICIAN:  Dr. Ani Perea. REASON FOR THE PROCEDURE:  Recurrent substernal chest pain with radiation to left arm highly suspicious for underlying coronary artery disease with dynamic ST and T-wave changes inferiorly with negative serial troponins. PROCEDURE PERFORMED:  Left heart catheterization with coronary angiography and left ventriculogram.      TOTAL CONTRAST:  100 mL of Isovue. CONSCIOUS SEDATION:  The patient was sedated by Su Soto with 2 mg Versed and 25 mcg fentanyl and monitored from 1314 to 1143 without complication. Frailty score is 3. PROCEDURAL TECHNIQUE:  After informed consent was obtained, the patient was brought to the cath lab, prepped and draped in the usual fashion. A 6-Maori sheath was placed in the right radial artery using a micropuncture modified Seldinger technique and left heart catheterization performed using standard 5-Maori angled pigtail and Tiger catheters without complications. Manual pressure will be applied to the patient's access site via TR band protocol. There were no complications. PRESSURE RESULTS:  Left ventricle 100/10-15, aorta 100/75. LEFT VENTRICULOGRAM:  Reveals normal left ventricular regional wall motion with ejection fraction greater than 55%. There is no mitral regurgitation and there is no aortic valve gradient on catheter pullback. Left ventricular end diastolic pressure is high normal to mildly elevated. CORONARY ANATOMY:  Left main has minimal irregularity dividing into an LAD and circumflex in the usual fashion. The LAD wraps around the apex of the left ventricle, but becomes very small caliber after the takeoff of a large mid vessel diagonal branch.   There are minimal irregularities up to 20% in the mid LAD just at the takeoff of the septal  #1. However, the remainder of the LAD as well as the entire diagonal have no significant disease. The circumflex is a fairly large bifurcating system, which has minimal luminal irregularities. The right coronary artery is a large anatomically dominant vessel with mild irregularities proximally and in the mid vessel up to 10-20%. The distal right coronary artery had fairly large but tortuous posterior descending and posterolateral branches have at most minimal luminal irregularity. CONCLUSION:  1. No significant coronary disease of hemodynamic significance. 2.  Normal left ventricular regional wall motion and ejection fraction. 3.  Noncardiac chest pain. The patient is already taking twice daily proton pump inhibitor therapy and has outpatient gastroenterology followup.       Katie Delgado MD       ATS / DN  D: 04/20/2018 13:49     T: 04/20/2018 17:38  JOB #: [id number]  CC: Michoacano Ramirez MD  [facility]  [address]  [place] [address]

## 2018-04-20 NOTE — DISCHARGE INSTRUCTIONS
Cardiac Catheterization/Angiography Discharge Instructions    *Check the puncture site frequently for swelling or bleeding. If you see any bleeding, lie down and apply pressure over the area with a clean town or washcloth. Notify your doctor for any redness, swelling, drainage or oozing from the puncture site. Notify your doctor for any fever or chills. *If the leg or arm with the puncture becomes cold, numb or painful, [facility] at  [phone]. *Activity should be limited for the next 48 hours. Climb stairs as little as possible and avoid any stooping, bending or strenuous activity for 48 hours. No heavy lifting (anything over 10 pounds) for three days. *Do not drive for 48 hours. *You may resume your usual diet. Drink more fluids than usual.    *Have a responsible person drive you home and stay with you for at least 24 hours after your heart catheterization/angiography. *You may remove the bandage from your Right and Arm in 24 hours. You may shower in 24 hours. No tub baths, hot tubs or swimming for one week. Do not place any lotions, creams, powders, ointments over the puncture site for one week. You may place a clean band-aid over the puncture site each day for 5 days. Change this daily.

## 2018-04-20 NOTE — PROGRESS NOTES
Bedside shift change report given to self (oncoming nurse) by Debbie Patino (offgoing nurse). Report included the following information SBAR.

## 2018-04-20 NOTE — ROUTINE PROCESS
TRANSFER - OUT REPORT:    [facility]  Dr. Darshan Pride  RRA access    Versed 2mg, fentanyl 25mcg  Diagnostic cath, medical management  TR band placed @1340 with 12ml air    Verbal report given to [facility] Demetrius RN(name) on [facility]  being transferred to [facility](unit) for routine progression of care       Report consisted of patients Situation, Background, Assessment and   Recommendations(SBAR). Information from the following report(s) Procedure Summary was reviewed with the receiving nurse. Lines:   Peripheral IV 04/19/18 Left Antecubital (Active)   Site Assessment Clean, dry, & intact 4/20/2018  4:54 AM   Phlebitis Assessment 0 4/20/2018  4:54 AM   Infiltration Assessment 0 4/20/2018  4:54 AM   Dressing Status Clean, dry, & intact 4/20/2018  4:54 AM   Dressing Type Tape;Transparent 4/20/2018  4:54 AM   Hub Color/Line Status Flushed;Patent; Infusing 4/20/2018  4:54 AM   Alcohol Cap Used No 4/20/2018  4:54 AM        Opportunity for questions and clarification was provided.       Patient transported with:   [facility]

## 2018-04-20 NOTE — PROGRESS NOTES
Discharge instructions reviewed with patient. Prescriptions given for no new meds and med info sheets provided for all new medications. Opportunity for questions provided. Patient voiced understanding of all discharge instructions. IVs removed and montior off at discharge by primary RN.

## 2018-04-20 NOTE — PROGRESS NOTES
Bedside and verbal shift report given to Richi Blackmon RN by Jordana Robles RN. Opportunity for questions given. Oncoming RN assumes all patient care.

## 2018-04-20 NOTE — ROUTINE PROCESS
Cath Lab Transfer In    Transferred from tele  Transferred to : Cath Lab Procedure Room 1  Reason for Transfer: [address] [place] PCI    Attending physician: Jose Ramon      Patient Assessment    Patient received into procedure room. No acute distress noted or verbalized. Procedural prep completed. Iv accesses assessed for patency. Review of pertinent labs and allergies completed. Noted presence of current History & Physical, updated within the previous 24 hours. Consent signed.

## 2018-04-20 NOTE — DISCHARGE SUMMARY
Physician Discharge Summary     Patient ID:  June Santos  [id number]  22 y.o.  1955    Admit date: 4/19/2018    Discharge date and time: 4/20/18    Admitting Physician: Pearl Ribeiro MD     Primary Cardiologist:karen Obrien MD    Discharge Physician: Fifi Augustine NP    Admission Diagnoses: chest pain  Chest pain  Chest pain    Discharge Diagnoses:   Patient Active Problem List    Diagnosis Date Noted    Chest pain 04/19/2018    Chronic low back pain 04/19/2018    Obesity, morbid ([facility] [address] 12/01/2017    Chest pain, unspecified 08/31/2017    Dyspnea on exertion 08/31/2017    Essential hypertension, benign 08/31/2017    Hypocalcemia 12/20/2015    Obesity (BMI 30-39.9) 12/19/2015    Sleep apnea 12/19/2015    Type 2 diabetes mellitus ([facility] [address] 12/19/2015    Spinal stenosis of lumbar region with neurogenic claudication 12/19/2015    Spinal stenosis, lumbar region, with neurogenic claudication 10/13/2015    GERD (gastroesophageal reflux disease)     High cholesterol     BPH (benign prostatic hypertrophy)            Hospital Course: June Santos was admitted to the hospital on 4/19/2018 with complaints of chest pain. Serial cardiac enzymes were negative for myocardial infarction. Cardiac catheterization revealed no evidence of obstructive CAD. He also underwent CT chest which was negative for pulmonary embolism. He will be discharged home after bedrest and follow up with PCP in 2-4 weeks. DISPOSITION: The patient is being discharged to home on a low saturated fat, low cholesterol diet. Pt is instructed to abstain from any heavy lifting, straining, stooping or driving for 5 days. Pt is instructed to watch groin site ( if groin access was performed) for bleeding/oozing. If so,pt is instructed to apply firm pressure with clean cloth and call office at [phone].  Pt is instructed to watch for signs of infection which include increasing area of redness around site, fever/hot to touch or purulent drainage. Pt is instructed not to soak in a tub bath for 1 week, but it is okay to shower. Discharge Exam:     Visit Vitals    /85    Pulse 76    Temp 97.6 °F (36.4 °C)    Resp 11    Ht 5' 10\" (1.778 m)    Wt 129.1 kg (284 lb 11.2 oz)    SpO2 96%    BMI 40.85 kg/m2     General Appearance:  Well developed, well nourished,alert and oriented x 3, and individual in no acute distress. Ears/Nose/Mouth/Throat:   Hearing grossly normal.         Neck: Supple. Chest:   Lungs clear to auscultation bilaterally. Cardiovascular:  Regular rate and rhythm, S1, S2 normal, no murmur. Abdomen:   Soft, non-tender, bowel sounds are active. Extremities: No edema bilaterally. Skin: Warm and dry. Final Laboratory Data:  Recent Results (from the past 24 hour(s))   EKG, 12 LEAD, INITIAL    Collection Time: 04/19/18  3:10 PM   Result Value Ref Range    Ventricular Rate 76 BPM    Atrial Rate 76 BPM    P-R Interval 172 ms    QRS Duration 88 ms    Q-T Interval 362 ms    QTC Calculation (Bezet) 407 ms    Calculated P Axis 18 degrees    Calculated R Axis 55 degrees    Calculated T Axis 61 degrees    Diagnosis       Normal sinus rhythm  Normal ECG  When compared with ECG of 19-DEC-2015 11:30,  Sinus rhythm has replaced Atrial fibrillation  Vent.  rate has decreased BY  42 BPM  QRS duration has decreased  T wave inversion no longer evident in Inferior leads  Confirmed by Jann Moeller MD (), PILY MARROQUIN ([id number]) on 4/19/2018 3:48:10 PM     GLUCOSE, POC    Collection Time: 04/19/18  4:03 PM   Result Value Ref Range    Glucose (POC) 231 (H) 65 - 100 mg/dL   TROPONIN I    Collection Time: 04/19/18  6:36 PM   Result Value Ref Range    Troponin-I, Qt. <0.02 (L) 0.02 - 0.05 NG/ML   D DIMER    Collection Time: 04/19/18  6:36 PM   Result Value Ref Range    D DIMER <0.27 <0.56 ug/ml(FEU)   GLUCOSE, POC    Collection Time: 04/19/18  9:47 PM   Result Value Ref Range    Glucose (POC) 435 (H) 65 - 100 mg/dL   TROPONIN I    Collection Time: 04/19/18 10:33 PM   Result Value Ref Range    Troponin-I, Qt. <0.02 (L) 0.02 - 0.05 NG/ML   TROPONIN I    Collection Time: 04/19/18 11:48 PM   Result Value Ref Range    Troponin-I, Qt. <0.02 (L) 0.02 - 0.05 NG/ML   PTT    Collection Time: 04/19/18 11:48 PM   Result Value Ref Range    aPTT 32.5 23.2 - 35.3 SEC   HEMOGLOBIN A1C WITH EAG    Collection Time: 04/19/18 11:48 PM   Result Value Ref Range    Hemoglobin A1c 9.8 (H) 4.8 - 6.0 %    Est. average glucose 235 mg/dL   LIPID PANEL    Collection Time: 04/20/18  3:50 AM   Result Value Ref Range    LIPID PROFILE          Cholesterol, total 130 <200 MG/DL    Triglyceride 344 (H) 35 - 150 MG/DL    HDL Cholesterol 32 (L) 40 - 60 MG/DL    LDL, calculated 29.2 <100 MG/DL    VLDL, calculated 68.8 (H) 6.0 - 23.0 MG/DL    CHOL/HDL Ratio 4.1     METABOLIC PANEL, BASIC    Collection Time: 04/20/18  3:50 AM   Result Value Ref Range    Sodium 141 136 - 145 mmol/L    Potassium 3.9 3.5 - 5.1 mmol/L    Chloride 103 98 - 107 mmol/L    CO2 30 21 - 32 mmol/L    Anion gap 8 7 - 16 mmol/L    Glucose 248 (H) 65 - 100 mg/dL    BUN 11 8 - 23 MG/DL    Creatinine 0.79 (L) 0.8 - 1.5 MG/DL    GFR est AA >60 >60 ml/min/1.73m2    GFR est non-AA >60 >60 ml/min/1.73m2    Calcium 8.5 8.3 - 10.4 MG/DL   CBC WITH AUTOMATED DIFF    Collection Time: 04/20/18  3:50 AM   Result Value Ref Range    WBC 7.0 4.3 - 11.1 K/uL    RBC 4.57 4.23 - 5.67 M/uL    HGB 13.3 (L) 13.6 - 17.2 g/dL    HCT 39.7 (L) 41.1 - 50.3 %    MCV 86.9 79.6 - 97.8 FL    MCH 29.1 26.1 - 32.9 PG    MCHC 33.5 31.4 - 35.0 g/dL    RDW 12.9 11.9 - 14.6 %    PLATELET 939 944 - 415 K/uL    MPV 11.4 10.8 - 14.1 FL    DF AUTOMATED      NEUTROPHILS 55 43 - 78 %    LYMPHOCYTES 35 13 - 44 %    MONOCYTES 6 4.0 - 12.0 %    EOSINOPHILS 3 0.5 - 7.8 %    BASOPHILS 1 0.0 - 2.0 %    IMMATURE GRANULOCYTES 0 0.0 - 5.0 %    ABS. NEUTROPHILS 3.8 1.7 - 8.2 K/UL    ABS.  LYMPHOCYTES 2.4 0.5 - 4.6 K/UL ABS. MONOCYTES 0.5 0.1 - 1.3 K/UL    ABS. EOSINOPHILS 0.2 0.0 - 0.8 K/UL    ABS. BASOPHILS 0.1 0.0 - 0.2 K/UL    ABS. IMM. GRANS. 0.0 0.0 - 0.5 K/UL   PTT    Collection Time: 04/20/18  8:22 AM   Result Value Ref Range    aPTT 39.7 (H) 23.2 - 35.3 SEC   PTT    Collection Time: 04/20/18 11:52 AM   Result Value Ref Range    aPTT 35.0 23.2 - 35.3 SEC   GLUCOSE, POC    Collection Time: 04/20/18 12:07 PM   Result Value Ref Range    Glucose (POC) 269 (H) 65 - 100 mg/dL       Disposition: home    Patient Instructions:   Current Discharge Medication List      CONTINUE these medications which have NOT CHANGED    Details   metFORMIN (GLUMETZA) 1,000 mg TG24 24 hour tablet Take 1 Tab by mouth Daily (before dinner). zolpidem (AMBIEN) 10 mg tablet Take 10 mg by mouth nightly. Per patient statement. tamsulosin (FLOMAX) 0.4 mg capsule TAKE 1 CAPSULE DAILY  Qty: 90 Cap, Refills: 3      albuterol (PROVENTIL HFA, VENTOLIN HFA, PROAIR HFA) 90 mcg/actuation inhaler Take 1 Puff by inhalation every four (4) hours as needed for Wheezing. Qty: 1 Inhaler, Refills: 0      pantoprazole (PROTONIX) 40 mg tablet Take 1 Tab by mouth daily. Qty: 90 Tab, Refills: 1      atorvastatin (LIPITOR) 40 mg tablet Take 20 mg by mouth daily. MULTIVITAMIN (MULTIPLE VITAMIN PO) Take 1 Tab by mouth daily. hydroCHLOROthiazide (HYDRODIURIL) 12.5 mg tablet Take 12.5 mg by mouth daily. eszopiclone (LUNESTA) 3 mg tablet Take 3 mg by mouth daily. glipiZIDE (GLUCOTROL) 10 mg tablet Take 10 mg by mouth daily. oxyCODONE IR (ROXICODONE) 20 mg immediate release tablet Take 30 mg by mouth every six (6) hours as needed. gabapentin (NEURONTIN) 300 mg capsule Take 800 mg by mouth four (4) times daily. amLODIPine (NORVASC) 5 mg tablet Take 5 mg by mouth every morning. Take day of surgery per anesthesia protocol. Indications: HYPERTENSION      lisinopril (PRINIVIL, ZESTRIL) 20 mg tablet Take 20 mg by mouth daily.  Indications: hypertension      OMEGA-3 FATTY ACIDS (FISH OIL CONCENTRATE PO) Take 1 Cap by mouth every morning. Stop seven days prior to surgery per anesthesia protocol. vitamin e 400 unit Tab Take 1 Tab by mouth every morning. Stop seven days prior to surgery per anesthesia protocol. STOP taking these medications       tiZANidine (ZANAFLEX) 4 mg tablet Comments:   Reason for Stopping:               Referenced discharge instructions provided by nursing for diet and activity. Follow-up:    PCP: Aris Kirkland MD) in about 4 weeks.     Signed:  Nellie Cueto NP  4/20/2018  2:31 PM

## 2018-04-20 NOTE — PROGRESS NOTES
[facility] CARDIOLOGY PROGRESS NOTE    4/20/2018 12:39 PM    Admit Date: 4/19/2018    Admit Diagnosis: chest pain; Chest pain; Chest pain      Subjective:   Stable overnight without angina, CHF, or palpitations. Vitals stable and controlled. No other complaints overnight. Tolerating meds well. Objective:      Vitals:    04/19/18 2125 04/20/18 0121 04/20/18 0508 04/20/18 0910   BP: 122/71 120/67 143/85 136/86   Pulse: 94 72 76 93   Resp: 18 19 19 20   Temp: 98.1 °F (36.7 °C) 97.8 °F (36.6 °C) 97.8 °F (36.6 °C) 97.6 °F (36.4 °C)   SpO2: 94% 91% 92% 95%   Weight:   129.1 kg (284 lb 11.2 oz)    Height:           Physical Exam:  Neck- supple, no JVD  CV- regular rate and rhythm no MRG  Lung- clear bilaterally  Abd- soft, nontender, nondistended  Ext- no edema  Skin- warm and dry    Data Review:   Recent Labs      04/20/18   0350   NA  141   K  3.9   BUN  11   CREA  0.79*   GLU  248*   WBC  7.0   HGB  13.3*   HCT  39.7*   PLT  212   CHOL  130   TRIGL  344*   HDL  32*       Assessment and Plan:     Principal Problem:    Chest pain, unspecified - resolved, negative serial troponins, LHC with poss PCI today. The benefits and risks of left heart catheterization and possible percutaneous intervention were discussed with the patient. Risks including but not limited to bleeding, infection, contrast allergy reaction, acute kidney injury, MI, stroke, emergent CABG and death were discussed. The patient understands the risks of the procedure and wishes to proceed.      Active Problems:    Obesity (BMI 30-39.9) - nutrition counseling      Overview: BMI 40.1      GERD (gastroesophageal reflux disease) ()- augment meds post cath as needed      Overview: managed with medication       Type 2 diabetes mellitus (HCC) - stable, continue meds      Overview: type 2, adverage glucose 150-200, symptomatic is unknown       High cholesterol ()- stable, continue meds      Essential hypertension, benign- stable, continue meds      Chest pain (4/19/2018)- [facility] today      BELINDA Bhatt MD  [facility] Cardiology  Pager [phone]

## 2019-02-13 RX ORDER — LIDOCAINE HYDROCHLORIDE 20 MG/ML
15 SOLUTION OROPHARYNGEAL AS NEEDED
Status: CANCELLED | OUTPATIENT
Start: 2019-02-13

## 2019-02-18 NOTE — PROGRESS NOTES
This RN verified with pt about appointment time and performing Manometry and pH study. Location was verified with pt. Medication list will be brought with pt.

## 2019-02-19 ENCOUNTER — HOSPITAL ENCOUNTER (OUTPATIENT)
Age: 64
Setting detail: OUTPATIENT SURGERY
Discharge: HOME | End: 2019-02-19
Attending: INTERNAL MEDICINE | Admitting: INTERNAL MEDICINE
Payer: COMMERCIAL

## 2019-02-19 PROCEDURE — 74011000250 HC RX REV CODE- 250: Performed by: INTERNAL MEDICINE

## 2019-02-19 PROCEDURE — 91034 GASTROESOPHAGEAL REFLUX TEST: CPT | Performed by: INTERNAL MEDICINE

## 2019-02-19 PROCEDURE — 91010 ESOPHAGUS MOTILITY STUDY: CPT | Performed by: INTERNAL MEDICINE

## 2019-02-19 PROCEDURE — 77030031717 HC CATH PRB COMFORTEC DISP SAND -B: Performed by: INTERNAL MEDICINE

## 2019-02-19 RX ORDER — RANITIDINE 300 MG/1
300 TABLET ORAL
COMMUNITY
End: 2019-03-14

## 2019-02-19 RX ORDER — LIDOCAINE HYDROCHLORIDE 20 MG/ML
30 SOLUTION OROPHARYNGEAL AS NEEDED
Status: DISCONTINUED | OUTPATIENT
Start: 2019-02-19 | End: 2019-02-19 | Stop reason: HOSPADM

## 2019-02-19 RX ORDER — LIDOCAINE HYDROCHLORIDE 20 MG/ML
15 SOLUTION OROPHARYNGEAL AS NEEDED
Status: DISCONTINUED | OUTPATIENT
Start: 2019-02-19 | End: 2019-02-19

## 2019-02-19 RX ORDER — DEXLANSOPRAZOLE 60 MG/1
60 CAPSULE, DELAYED RELEASE ORAL
COMMUNITY
End: 2019-03-14

## 2019-02-19 RX ADMIN — LIDOCAINE HYDROCHLORIDE 30 ML: 20 SOLUTION ORAL; TOPICAL at 09:58

## 2019-02-19 NOTE — PROGRESS NOTES
Pt tolerated Manometry probe insertion at 48cm and test performed. Pt tolerated pH study probe insertion at 39cm. Pt verbalized understanding and taught back about recording device. Pt instructed to come to ENDO on the 2nd floor tomorrow any time after 1030. Pt also instructed to bring paperwork back with him tomorrow. Pt d/c via car.

## 2019-02-20 ENCOUNTER — HOSPITAL ENCOUNTER (OUTPATIENT)
Age: 64
Setting detail: OUTPATIENT SURGERY
Discharge: HOME | End: 2019-02-20
Attending: INTERNAL MEDICINE | Admitting: INTERNAL MEDICINE
Payer: COMMERCIAL

## 2019-02-20 PROCEDURE — 99211 OFF/OP EST MAY X REQ PHY/QHP: CPT | Performed by: INTERNAL MEDICINE

## 2019-02-20 NOTE — PROGRESS NOTES
Pt returned to [facility] to have pH probe removed. Pt tolerated probe throughout night and brought back paperwork. Probe removed successfully and pt d/c to home. Pt informed results will be called to him in about 1 week from the office.

## 2019-02-27 NOTE — PROCEDURES
[facility]  PROCEDURE NOTE    Name:  Thanh Ogden  MR#:  [id number]  :  1955  ACCOUNT #:  [de-identified]  DATE OF SERVICE:  2019    PREOPERATIVE DIAGNOSIS:  Gastroesophageal reflux disease. POSTOPERATIVE DIAGNOSES:  1. Abnormal acid reflux demonstrated with total acidification time 8.8% - DeMeester score of 25.5. (normal less than 14.7). 2.  Good correlation with symptoms of epigastric pain (symptom index 100%), heartburn (symptom index 80%), and regurgitation (symptom index 100%). PROCEDURE PERFORMED:  24-hour pH/impedance monitoring. PROCEDURE IN DETAIL:  After obtaining informed consent, the patient underwent nasal intubation. The analysis time was 20 hours and 43 minutes of which 8 hours and 28 minutes was recumbent, 12 hours and 16 minutes was upright. During this time, the patient had 53 upright and 1 recumbent acid reflux episode. The acidification time was 14.8% upright and 8.8% total.    The impedance data was unremarkable with a total of 49, normal less than 73. All were upright and 46/49 were acidic. Symptoms related to pH were epigastric pain x2 with no correlation with acid reflux, heartburn x10 with 7 related to acid reflux, and regurgitation x4 with 3 related to acid reflux, for a symptom index respectively of 0, 70, and 75%. By impedance, these numbers were 100, 80, and 100% and by symptom association, they all achieve significant associations of 99.4%, 100%, and 100%. DISPOSITION:  The patient will follow up with Dr. Dayana Montanez.         Johnson Lofton MD      [id number]/  D:  2019 10:23  T:  2019 20:58  JOB #:  [id number]  CC:  Maegan Ahumada MD

## 2019-02-28 NOTE — PROCEDURES
[facility]  PROCEDURE NOTE    Name:  Viktoria Silva  MR#:  [id number]  :  1955  ACCOUNT #:  [de-identified]  DATE OF SERVICE:  2019    PREOPERATIVE DIAGNOSIS:  Gastroesophageal reflux disease. POSTOPERATIVE DIAGNOSES:  1. Inadequate relaxation of the lower esophageal sphincter with median IRP 36 (normal less than 20). 2.  Vigorous contractility but with premature or fragmented contractions - see discussion below. 3.  Impedance data was 60% liquid and 30% viscous with complete transit. PROCEDURE PERFORMED:  High-resolution impedance manometry. PROCEDURE IN DETAIL:  After obtaining informed consent, the patient underwent nasal intubation. The lower esophageal sphincter was located between 45.8 and 51.2 cm from the naris. There was a type 1 phenotype, indicating no evidence of a hiatal hernia. The patient had 10 wet swallows. The IRP averaged 36 and the average pressure at rest at the LES was 41. This is not a hypertensive LES but there was an adequate relaxation. The contractions uniformly had a DL less than 4.5. This appeared nearly vertical without slope and indicate premature contractility. On 6/10, there was a peristaltic break greater than 5 cm. By impedance metrics, 60% of liquid and 30% of viscous swallows had an exit demonstrated, indicating complete transit. DISCUSSION:  This patient appears to have an early tendency towards development of type 3 achalasia. The LES pressure remain within normal limits but relaxation is somewhat diminished. This may be preclinical; however, only 30% of viscous swallows were passed completely and the patient's preserved contractility albeit with the majority, either premature, with large peristaltic breaks, or both wound indicate a type 3 achalasia. Please correlate with the patient's symptomatology.         Najma Peres MD      [id number]/  D:  2019 10:56  T:  2019 19:21  JOB #:  [id number]  CC:  Maddie Kenny Heading, MD

## 2020-04-27 NOTE — PROGRESS NOTES
5/14/2019 chronic GERD, epigastric pain  2/13/2019 chronic GERD, chronic pain syndrome, epigastric pain24-hour pH study ordered

## 2020-06-03 LAB — HBA1C MFR BLD HPLC: 10.9 %

## 2020-09-04 LAB — HBA1C MFR BLD HPLC: 10 %

## 2020-12-03 ENCOUNTER — HOSPITAL ENCOUNTER (OUTPATIENT)
Dept: ULTRASOUND IMAGING | Age: 65
Discharge: HOME | End: 2020-12-03
Attending: UROLOGY

## 2020-12-03 DIAGNOSIS — N28.89 RENAL MASS: ICD-10-CM

## 2020-12-29 ENCOUNTER — HOSPITAL ENCOUNTER (OUTPATIENT)
Dept: GENERAL RADIOLOGY | Age: 65
Discharge: HOME | End: 2020-12-29
Payer: COMMERCIAL

## 2020-12-29 DIAGNOSIS — R06.02 SOB (SHORTNESS OF BREATH): ICD-10-CM

## 2020-12-29 PROBLEM — R09.02 HYPOXIA: Status: ACTIVE | Noted: 2020-12-29

## 2020-12-29 PROCEDURE — 71046 X-RAY EXAM CHEST 2 VIEWS: CPT

## 2021-03-03 ENCOUNTER — HOSPITAL ENCOUNTER (OUTPATIENT)
Dept: LAB | Age: 66
Discharge: HOME | End: 2021-03-03
Payer: COMMERCIAL

## 2021-03-03 ENCOUNTER — HOSPITAL ENCOUNTER (OUTPATIENT)
Dept: CT IMAGING | Age: 66
Discharge: HOME | End: 2021-03-03
Attending: INTERNAL MEDICINE
Payer: COMMERCIAL

## 2021-03-03 DIAGNOSIS — R09.02 HYPOXIA: ICD-10-CM

## 2021-03-03 LAB
ANION GAP SERPL CALC-SCNC: 5 MMOL/L (ref 7–16)
BASOPHILS # BLD: 0.1 K/UL (ref 0–0.2)
BASOPHILS NFR BLD: 1 % (ref 0–2)
BNP SERPL-MCNC: 66 PG/ML (ref 5–125)
BUN SERPL-MCNC: 13 MG/DL (ref 8–23)
CALCIUM SERPL-MCNC: 8.9 MG/DL (ref 8.3–10.4)
CHLORIDE SERPL-SCNC: 99 MMOL/L (ref 98–107)
CO2 SERPL-SCNC: 31 MMOL/L (ref 21–32)
CREAT SERPL-MCNC: 0.91 MG/DL (ref 0.8–1.5)
DIFFERENTIAL METHOD BLD: NORMAL
EOSINOPHIL # BLD: 0.2 K/UL (ref 0–0.8)
EOSINOPHIL NFR BLD: 3 % (ref 0.5–7.8)
ERYTHROCYTE [DISTWIDTH] IN BLOOD BY AUTOMATED COUNT: 12.6 % (ref 11.9–14.6)
GLUCOSE SERPL-MCNC: 181 MG/DL (ref 65–100)
HCT VFR BLD AUTO: 43.9 % (ref 41.1–50.3)
HGB BLD-MCNC: 14 G/DL (ref 13.6–17.2)
IMM GRANULOCYTES # BLD AUTO: 0 K/UL (ref 0–0.5)
IMM GRANULOCYTES NFR BLD AUTO: 0 % (ref 0–5)
LYMPHOCYTES # BLD: 1.6 K/UL (ref 0.5–4.6)
LYMPHOCYTES NFR BLD: 22 % (ref 13–44)
MCH RBC QN AUTO: 28.7 PG (ref 26.1–32.9)
MCHC RBC AUTO-ENTMCNC: 31.9 G/DL (ref 31.4–35)
MCV RBC AUTO: 90.1 FL (ref 79.6–97.8)
MONOCYTES # BLD: 0.6 K/UL (ref 0.1–1.3)
MONOCYTES NFR BLD: 8 % (ref 4–12)
NEUTS SEG # BLD: 4.7 K/UL (ref 1.7–8.2)
NEUTS SEG NFR BLD: 66 % (ref 43–78)
NRBC # BLD: 0 K/UL (ref 0–0.2)
PLATELET # BLD AUTO: 203 K/UL (ref 150–450)
PMV BLD AUTO: 11.2 FL (ref 9.4–12.3)
POTASSIUM SERPL-SCNC: 4.2 MMOL/L (ref 3.5–5.1)
RBC # BLD AUTO: 4.87 M/UL (ref 4.23–5.6)
SODIUM SERPL-SCNC: 135 MMOL/L (ref 138–145)
TSH W FREE THYROID I,TSHELE: 0.73 UIU/ML (ref 0.36–3.74)
WBC # BLD AUTO: 7.1 K/UL (ref 4.3–11.1)

## 2021-03-03 PROCEDURE — 85025 COMPLETE CBC W/AUTO DIFF WBC: CPT

## 2021-03-03 PROCEDURE — 83880 ASSAY OF NATRIURETIC PEPTIDE: CPT

## 2021-03-03 PROCEDURE — 80048 BASIC METABOLIC PNL TOTAL CA: CPT

## 2021-03-03 PROCEDURE — 84443 ASSAY THYROID STIM HORMONE: CPT

## 2021-03-03 PROCEDURE — 36415 COLL VENOUS BLD VENIPUNCTURE: CPT

## 2021-03-03 RX ORDER — SODIUM CHLORIDE 0.9 % (FLUSH) 0.9 %
10 SYRINGE (ML) INJECTION
Status: COMPLETED | OUTPATIENT
Start: 2021-03-03 | End: 2021-03-03

## 2021-03-03 RX ADMIN — Medication 10 ML: at 09:50

## 2021-03-04 PROBLEM — J44.9 COPD (CHRONIC OBSTRUCTIVE PULMONARY DISEASE) (HCC): Status: ACTIVE | Noted: 2021-03-04

## 2021-08-03 PROBLEM — R07.9 CHEST PAIN, UNSPECIFIED: Status: RESOLVED | Noted: 2017-08-31 | Resolved: 2021-08-03

## 2022-03-18 PROBLEM — R09.02 HYPOXIA: Status: ACTIVE | Noted: 2020-12-29

## 2022-03-18 PROBLEM — I10 ESSENTIAL HYPERTENSION, BENIGN: Status: ACTIVE | Noted: 2017-08-31

## 2022-03-19 PROBLEM — J44.9 COPD (CHRONIC OBSTRUCTIVE PULMONARY DISEASE) (HCC): Status: ACTIVE | Noted: 2021-03-04

## 2022-03-19 PROBLEM — R07.9 CHEST PAIN: Status: ACTIVE | Noted: 2018-04-19

## 2022-03-19 PROBLEM — E66.01 OBESITY, MORBID (HCC): Status: ACTIVE | Noted: 2017-12-01

## 2022-03-19 PROBLEM — R06.09 DOE (DYSPNEA ON EXERTION): Status: ACTIVE | Noted: 2017-08-31

## 2022-03-20 PROBLEM — M54.50 CHRONIC LOW BACK PAIN: Status: ACTIVE | Noted: 2018-04-19

## 2022-03-20 PROBLEM — G89.29 CHRONIC LOW BACK PAIN: Status: ACTIVE | Noted: 2018-04-19

## 2022-06-08 ENCOUNTER — OFFICE VISIT (OUTPATIENT)
Dept: ORTHOPEDIC SURGERY | Age: 67
End: 2022-06-08
Payer: MEDICARE

## 2022-06-08 VITALS — WEIGHT: 311 LBS | BODY MASS INDEX: 48.81 KG/M2 | HEIGHT: 67 IN

## 2022-06-08 DIAGNOSIS — M17.11 PRIMARY OSTEOARTHRITIS OF RIGHT KNEE: ICD-10-CM

## 2022-06-08 DIAGNOSIS — M25.561 ACUTE PAIN OF RIGHT KNEE: Primary | ICD-10-CM

## 2022-06-08 DIAGNOSIS — E66.01 MORBID OBESITY (HCC): ICD-10-CM

## 2022-06-08 PROCEDURE — 99204 OFFICE O/P NEW MOD 45 MIN: CPT | Performed by: ORTHOPAEDIC SURGERY

## 2022-06-08 PROCEDURE — 1123F ACP DISCUSS/DSCN MKR DOCD: CPT | Performed by: ORTHOPAEDIC SURGERY

## 2022-06-08 NOTE — PROGRESS NOTES
Name: Zaida Ibarra  YOB: 1955  Gender: male  MRN: [id number]    CC: Right knee pain    HPI: Zaida Ibarra is a 74 y.o. male who presents with a 2-month history of right knee pain. He does have a number of significant chronic medical issues including chronic pain related to his lower back and other areas which he sees Dr. Davin Damon. He is on chronic narcotic medications. He has had lumbar spinal surgery with fusion and has had numerous injections in various locations including his neck shoulder and lower back. He does note that he has peripheral neuropathy and a lot of numbness in the right lower extremity in general.    He states his right knee became more painful about 2 months ago while doing some work in his yard. It has improved to some degree we will continue discomfort he want to have it checked out. History was obtained from patient    ROS/Meds/PSH/PMH/FH/SH: I personally reviewed the patients standard intake form. Below are the pertinents    Tobacco:  reports that he quit smoking about 33 years ago. He smoked 3.00 packs per day. He has never used smokeless tobacco.  Past Medical History:   Diagnosis Date    Acquired cyst of kidney     Arthritis     generalized     Balanoposthitis     BPH (benign prostatic hypertrophy)     Chronic pain     back     Claustrophobia     Depression     Dysuria     GERD (gastroesophageal reflux disease)     managed with medication     High cholesterol     Hypertension     managed with medication     Impotence of organic origin     Obesity (BMI 30-39. 9)     Obesity (BMI 30-39. 9)     BMI 40.1    Other testicular hypofunction     Tinnitus of both ears     Type 2 diabetes mellitus (HCC)     type 2, adverage glucose 150-200, symptomatic is unknown     Unspecified adverse effect of anesthesia     woke up in middle of procedure x 2    Unspecified sleep apnea     CPAP compliant    Wears dentures     uppers      Past Surgical History:   Procedure Laterality Date    ANKLE FRACTURE SURGERY  5 yrs ago    right with pinning    CARPAL TUNNEL RELEASE  over 10 yrs ago    bilateral    CIRCUMCISION      ORTHOPEDIC SURGERY      L4 and 5 compression with dustin in place        Physical Examination:  Physical exam: On examination of the patient's gait there is antalgia in stance on the right side. and there is varus deformity in the right knee     He is noted to have a BMI of about 49. He does have his weight distributed primarily around his abdominal area. On examination while standing there is decreased flexion in the lumbosacral spine without acute list or spasm. While seated ,  the Bilateral hip is examined there is full range of motion without obvious issue . On examination of the  Right knee :ROM is 0 to 125 There is significant tenderness to direct palpation and There is a mild effusion. On examination of the  Left knee : There is full range of motion without obvious instability. Patient is neurologically intact distally. Skin is intact. Imaging:   Radiographs: An AP standing, skiers, flexion lateral, and sunrise view of the right knee knee was obtained  This demonstrated  Marked medial joint space narrowing. Radiographic impression: marked DJD right Knee    Assessment:   Degenerative joint disease of the right Knee. I did discuss with the patient the source of the pain and treatment options. We discussed nonsurgical measures including:Injection therapy, Bracing, Weight Loss, Activity Modification and Use of assistive device. I counseled the patient regarding the difference between various injection therapies. I explained the role of steroid injections and focused on the use of steroids for more acute issues and flareups of arthritic and other concerns within the joint. Also counseled patient regarding the role of viscosupplementation.   I counseled them about the use of viscosupplementation in more moderate issues and more chronic problems. Counseled about the way in which would be administered in the expectation in terms of outcome. We talked about the risks of injection therapy with viscosupplementation. We also discussed the definitive option of total Knee arthroplasty. I counseled the patient regarding the nature of the procedure the preoperative preparation necessary postop recovery and expected outcome. I counseled them regarding the risks of surgery including risk of infection, DVT formation, loss of motion, dislocation and stiffness. This patient has the usual expected risk for perioperative medical or surgical complication. , Morbid obesity with a BMI in excess of 40. This increases the risk for medical complications perioperatively including pulmonary issues, cardiovascular issues, and surgical complications including wound healing problems and increased risk for infection. , History of significant lumbar spinal issues which may complicate recovery and increased risk for persistent postoperative pain. and Additionally he is on chronic pain management with taking more 30 mg tablets of oxycodone several times daily. This would complicate pain management and preoperative planning as well. Would also impact his long-term outcome from any elective surgery in terms of pain relief. We discussed time return to work , general activity and long-term expectations. This patient, with their level of home support, medical comorbidities, and general ambulatory function expected to have an overnight stay in the hospital prior to discharge. I described the [facility] for the patient and expected time for hospitalization. If the patient ultimately comes to arthroplasty, it will be a category 1 in technical complexity unless circumstances change.   This reflects an estimate of surgical time and difficulty but does not necessarily represent a description of the overall complexity of medical and surgical management this particular patient. I would plan a Cementless Mobile Depuy Attune TKA. I discussed my implant selection process and rationale with the patient. I did offer him a cortisone injection today but as he is feeling better he preferred not to proceed with that. I did discuss the indications for an injection if this nature as well as viscosupplementation. He understands progressive nature of the osteoarthritis and the considerations outlined above. Plan:       Follow up:   Return for As needed.      Albina Martinez MD

## 2022-06-16 ENCOUNTER — HOSPITAL ENCOUNTER (OUTPATIENT)
Dept: SLEEP CENTER | Age: 67
Discharge: HOME | End: 2022-06-19
Payer: MEDICARE

## 2022-06-16 PROCEDURE — 95811 POLYSOM 6/>YRS CPAP 4/> PARM: CPT

## 2022-06-21 ENCOUNTER — TELEPHONE (OUTPATIENT)
Dept: SLEEP MEDICINE | Age: 67
End: 2022-06-21

## 2022-06-27 ENCOUNTER — OFFICE VISIT (OUTPATIENT)
Dept: FAMILY MEDICINE CLINIC | Facility: CLINIC | Age: 67
End: 2022-06-27
Payer: MEDICARE

## 2022-06-27 VITALS
SYSTOLIC BLOOD PRESSURE: 160 MMHG | BODY MASS INDEX: 46.06 KG/M2 | WEIGHT: 311 LBS | HEIGHT: 69 IN | DIASTOLIC BLOOD PRESSURE: 80 MMHG

## 2022-06-27 DIAGNOSIS — Z79.4 TYPE 2 DIABETES MELLITUS WITH HYPERGLYCEMIA, WITH LONG-TERM CURRENT USE OF INSULIN (HCC): ICD-10-CM

## 2022-06-27 DIAGNOSIS — E11.65 TYPE 2 DIABETES MELLITUS WITH HYPERGLYCEMIA, WITH LONG-TERM CURRENT USE OF INSULIN (HCC): ICD-10-CM

## 2022-06-27 DIAGNOSIS — E66.9 OBESITY (BMI 30-39.9): ICD-10-CM

## 2022-06-27 DIAGNOSIS — G47.33 OSA TREATED WITH BIPAP: ICD-10-CM

## 2022-06-27 DIAGNOSIS — E78.00 HIGH CHOLESTEROL: ICD-10-CM

## 2022-06-27 DIAGNOSIS — E83.51 HYPOCALCEMIA: ICD-10-CM

## 2022-06-27 DIAGNOSIS — E11.9 TYPE 2 DIABETES MELLITUS WITHOUT COMPLICATION, WITH LONG-TERM CURRENT USE OF INSULIN (HCC): ICD-10-CM

## 2022-06-27 DIAGNOSIS — J41.0 SIMPLE CHRONIC BRONCHITIS (HCC): ICD-10-CM

## 2022-06-27 DIAGNOSIS — Z79.4 TYPE 2 DIABETES MELLITUS WITHOUT COMPLICATION, WITH LONG-TERM CURRENT USE OF INSULIN (HCC): ICD-10-CM

## 2022-06-27 DIAGNOSIS — K21.9 GASTROESOPHAGEAL REFLUX DISEASE, UNSPECIFIED WHETHER ESOPHAGITIS PRESENT: ICD-10-CM

## 2022-06-27 DIAGNOSIS — I10 ESSENTIAL HYPERTENSION, BENIGN: Primary | ICD-10-CM

## 2022-06-27 LAB
ALBUMIN SERPL-MCNC: 3.6 G/DL (ref 3.2–4.6)
ALBUMIN/GLOB SERPL: 1.2 {RATIO} (ref 1.2–3.5)
ALP SERPL-CCNC: 81 U/L (ref 50–136)
ALT SERPL-CCNC: 35 U/L (ref 12–65)
ANION GAP SERPL CALC-SCNC: 6 MMOL/L (ref 7–16)
AST SERPL-CCNC: 19 U/L (ref 15–37)
BASOPHILS # BLD: 0.1 K/UL (ref 0–0.2)
BASOPHILS NFR BLD: 1 % (ref 0–2)
BILIRUB SERPL-MCNC: 0.4 MG/DL (ref 0.2–1.1)
BUN SERPL-MCNC: 12 MG/DL (ref 8–23)
CALCIUM SERPL-MCNC: 9.2 MG/DL (ref 8.3–10.4)
CHLORIDE SERPL-SCNC: 105 MMOL/L (ref 98–107)
CHOLEST SERPL-MCNC: 93 MG/DL
CO2 SERPL-SCNC: 30 MMOL/L (ref 21–32)
CREAT SERPL-MCNC: 0.8 MG/DL (ref 0.8–1.5)
DIFFERENTIAL METHOD BLD: ABNORMAL
EOSINOPHIL # BLD: 0.1 K/UL (ref 0–0.8)
EOSINOPHIL NFR BLD: 2 % (ref 0.5–7.8)
ERYTHROCYTE [DISTWIDTH] IN BLOOD BY AUTOMATED COUNT: 13.3 % (ref 11.9–14.6)
GLOBULIN SER CALC-MCNC: 3 G/DL (ref 2.3–3.5)
GLUCOSE SERPL-MCNC: 129 MG/DL (ref 65–100)
HCT VFR BLD AUTO: 40.5 % (ref 41.1–50.3)
HDLC SERPL-MCNC: 36 MG/DL (ref 40–60)
HDLC SERPL: 2.6 {RATIO}
HGB BLD-MCNC: 12.8 G/DL (ref 13.6–17.2)
IMM GRANULOCYTES # BLD AUTO: 0.1 K/UL (ref 0–0.5)
IMM GRANULOCYTES NFR BLD AUTO: 1 % (ref 0–5)
LDLC SERPL CALC-MCNC: 14 MG/DL
LYMPHOCYTES # BLD: 1.8 K/UL (ref 0.5–4.6)
LYMPHOCYTES NFR BLD: 21 % (ref 13–44)
MCH RBC QN AUTO: 28.4 PG (ref 26.1–32.9)
MCHC RBC AUTO-ENTMCNC: 31.6 G/DL (ref 31.4–35)
MCV RBC AUTO: 90 FL (ref 79.6–97.8)
MONOCYTES # BLD: 0.6 K/UL (ref 0.1–1.3)
MONOCYTES NFR BLD: 7 % (ref 4–12)
NEUTS SEG # BLD: 5.8 K/UL (ref 1.7–8.2)
NEUTS SEG NFR BLD: 68 % (ref 43–78)
NRBC # BLD: 0 K/UL (ref 0–0.2)
PLATELET # BLD AUTO: 208 K/UL (ref 150–450)
PMV BLD AUTO: 11 FL (ref 9.4–12.3)
POTASSIUM SERPL-SCNC: 4.2 MMOL/L (ref 3.5–5.1)
PROT SERPL-MCNC: 6.6 G/DL (ref 6.3–8.2)
RBC # BLD AUTO: 4.5 M/UL (ref 4.23–5.6)
SODIUM SERPL-SCNC: 141 MMOL/L (ref 138–145)
TRIGL SERPL-MCNC: 215 MG/DL (ref 35–150)
VLDLC SERPL CALC-MCNC: 43 MG/DL (ref 6–23)
WBC # BLD AUTO: 8.4 K/UL (ref 4.3–11.1)

## 2022-06-27 PROCEDURE — 3052F HG A1C>EQUAL 8.0%<EQUAL 9.0%: CPT | Performed by: FAMILY MEDICINE

## 2022-06-27 PROCEDURE — 99214 OFFICE O/P EST MOD 30 MIN: CPT | Performed by: FAMILY MEDICINE

## 2022-06-27 PROCEDURE — 1123F ACP DISCUSS/DSCN MKR DOCD: CPT | Performed by: FAMILY MEDICINE

## 2022-06-27 ASSESSMENT — ANXIETY QUESTIONNAIRES
1. FEELING NERVOUS, ANXIOUS, OR ON EDGE: 0
3. WORRYING TOO MUCH ABOUT DIFFERENT THINGS: 0
4. TROUBLE RELAXING: 0
5. BEING SO RESTLESS THAT IT IS HARD TO SIT STILL: 0
6. BECOMING EASILY ANNOYED OR IRRITABLE: 0
2. NOT BEING ABLE TO STOP OR CONTROL WORRYING: 0
IF YOU CHECKED OFF ANY PROBLEMS ON THIS QUESTIONNAIRE, HOW DIFFICULT HAVE THESE PROBLEMS MADE IT FOR YOU TO DO YOUR WORK, TAKE CARE OF THINGS AT HOME, OR GET ALONG WITH OTHER PEOPLE: NOT DIFFICULT AT ALL
7. FEELING AFRAID AS IF SOMETHING AWFUL MIGHT HAPPEN: 0
GAD7 TOTAL SCORE: 0

## 2022-06-27 ASSESSMENT — PATIENT HEALTH QUESTIONNAIRE - PHQ9
SUM OF ALL RESPONSES TO PHQ QUESTIONS 1-9: 0
SUM OF ALL RESPONSES TO PHQ QUESTIONS 1-9: 0
SUM OF ALL RESPONSES TO PHQ9 QUESTIONS 1 & 2: 0
SUM OF ALL RESPONSES TO PHQ QUESTIONS 1-9: 0
2. FEELING DOWN, DEPRESSED OR HOPELESS: 0
1. LITTLE INTEREST OR PLEASURE IN DOING THINGS: 0
SUM OF ALL RESPONSES TO PHQ QUESTIONS 1-9: 0

## 2022-06-27 ASSESSMENT — ENCOUNTER SYMPTOMS
BACK PAIN: 1
RESPIRATORY NEGATIVE: 1
EYES NEGATIVE: 1

## 2022-06-27 NOTE — PROGRESS NOTES
[address] [place] [address]  _______________________________________  Ny Wilkinson MD                 [address] [place] [address],  [address]. MD Marcell                     [place], Ch[address]                                                                                    Phone: [phone]                                                                                    Fax: [phone]    Ailin Cain is a 74 y.o. male who is seen for evaluation of   Chief Complaint   Patient presents with    Hypertension    Cholesterol Problem    Diabetes       HPI:   Hypertension  This is a chronic problem. Episode onset: Patient with hypertension controlled medication needs refills recheck labs. Diabetes  He presents for his follow-up diabetic visit. He has type 2 diabetes mellitus. Onset time: Patient with type 2 diabetes needs refills recheck labs side effects. Pertinent negatives for hypoglycemia include no dizziness. Back Pain  This is a chronic problem. Episode frequency: Chronic back pain, patient with pain issues little bit worse on the right side with but without trauma,  Pertinent negatives include no numbness. Chief Complaint   Patient presents with    Hypertension    Cholesterol Problem    Diabetes         Review of Systems:    Review of Systems   Constitutional: Negative for activity change, appetite change, chills and diaphoresis. Eyes: Negative. Respiratory: Negative. Cardiovascular: Negative. Endocrine: Negative. Genitourinary: Negative. Musculoskeletal: Positive for back pain. Neurological: Negative for dizziness, facial asymmetry, light-headedness and numbness.        History:  Past Medical History:   Diagnosis Date    Acquired cyst of kidney     Arthritis     generalized     Balanoposthitis     BPH (benign prostatic hypertrophy)     Chronic pain     back     Claustrophobia     Depression     Dysuria     GERD (gastroesophageal reflux disease) managed with medication     High cholesterol     Hypertension     managed with medication     Impotence of organic origin     Obesity (BMI 30-39. 9)     Obesity (BMI 30-39. 9)     BMI 40.1    Other testicular hypofunction     Tinnitus of both ears     Type 2 diabetes mellitus (HCC)     type 2, adverage glucose 150-200, symptomatic is unknown     Unspecified adverse effect of anesthesia     woke up in middle of procedure x 2    Unspecified sleep apnea     CPAP compliant    Wears dentures     uppers       Past Surgical History:   Procedure Laterality Date    ANKLE FRACTURE SURGERY  5 yrs ago    right with pinning    CARPAL TUNNEL RELEASE  over 10 yrs ago    bilateral    CIRCUMCISION      ORTHOPEDIC SURGERY      L4 and 5 compression with dustin in place       Family History   Problem Relation Age of Onset    Diabetes Sister     Diabetes Brother     Diabetes Sister     Diabetes Mother     Stroke Father     Hypertension Mother     Diabetes Father     Hypertension Father     Stroke Mother        Social History     Tobacco Use    Smoking status: Former Smoker     Packs/day: 3.00     Quit date: 10/5/1988     Years since quittin.7    Smokeless tobacco: Never Used    Tobacco comment: Quit smoking: quit    Substance Use Topics    Alcohol use:  Yes       Allergies   Allergen Reactions    Adhesive Tape Hives     bandaids         Current Outpatient Medications   Medication Sig Dispense Refill    OXYGEN 2 lpm qhs and 2 lpm with exertion prn      albuterol (PROVENTIL) (2.5 MG/3ML) 0.083% nebulizer solution Inhale 2.5 mg into the lungs 4 times daily      amLODIPine (NORVASC) 5 MG tablet Take 5 mg by mouth      atorvastatin (LIPITOR) 40 MG tablet Take 20 mg by mouth daily      azelastine (ASTELIN) 0.1 % nasal spray 2 sprays by Nasal route 2 times daily      famotidine (PEPCID) 40 MG tablet Take 40 mg by mouth daily      Fluticasone-Umeclidin-Vilant (TRELEGY ELLIPTA) 200-62.5-25 MCG/INH AEPB Inhale 1 puff into the lungs every morning (before breakfast)      gabapentin (NEURONTIN) 300 MG capsule Take 800 mg by mouth 4 times daily.  glipiZIDE (GLUCOTROL) 10 MG tablet Take 10 mg by mouth daily      hydroCHLOROthiazide (HYDRODIURIL) 12.5 MG tablet Take 12.5 mg by mouth daily      Insulin Degludec (TRESIBA FLEXTOUCH) 100 UNIT/ML SOPN Inject 140 Units into the skin      insulin lispro (HUMALOG KWIKPEN) 200 UNIT/ML SOPN pen 25 units breakfast 35 units supper plus correction up to 80 units per day      lisinopril (PRINIVIL;ZESTRIL) 20 MG tablet Take 20 mg by mouth daily      metFORMIN (GLUCOPHAGE-XR) 500 MG extended release tablet Take 1,500 mg by mouth      naloxegol (MOVANTIK) 25 MG TABS tablet Take by mouth every morning (before breakfast)      oxyCODONE (OXY-IR) 30 MG immediate release tablet Take 30 mg by mouth every 6 hours as needed.  pantoprazole (PROTONIX) 40 MG tablet Take 40 mg by mouth 2 times daily      Sennosides 8.6 MG CAPS Take by mouth      tamsulosin (FLOMAX) 0.4 MG capsule TAKE 1 CAPSULE BY MOUTH DAILY      Vitamin E 400 units TABS Take 1 tablet by mouth      zolpidem (AMBIEN) 10 MG tablet Take 10 mg by mouth. No current facility-administered medications for this visit. Vitals:    BP (!) 160/80   Ht 5' 9\" (1.753 m)   Wt (!) 311 lb (141.1 kg)   BMI 45.93 kg/m²     Physical Exam:  Physical Exam  Vitals and nursing note reviewed. Constitutional:       Appearance: Normal appearance. He is obese. He is not ill-appearing or diaphoretic. HENT:      Head: Normocephalic and atraumatic. Nose: Nose normal.   Eyes:      Pupils: Pupils are equal, round, and reactive to light. Cardiovascular:      Rate and Rhythm: Normal rate and regular rhythm. Pulses: Normal pulses. Heart sounds: Normal heart sounds. Pulmonary:      Effort: Pulmonary effort is normal.      Breath sounds: Normal breath sounds.    Musculoskeletal:         General: Tenderness

## 2022-06-28 LAB
EST. AVERAGE GLUCOSE BLD GHB EST-MCNC: 192 MG/DL
HBA1C MFR BLD: 8.3 % (ref 4.2–6.3)

## 2022-06-29 NOTE — PROGRESS NOTES
[address] [place] [address] Court, [address] [facility]  [phone]    Patient Name:  Kristy Castellanos  YOB: 1955      Office Visit 6/30/2022    Chief Complaint   Patient presents with    CPAP/BiPAP    Sleep Apnea       HISTORY OF PRESENT ILLNESS:    This pleasant gentleman was seen today after having a BiPAP titration. BiPAP titration was done on 6/16/2022 that showed patient required pressure of 15/10 with oxygen 4 L/min. Looking over the data patient still has hypoxemia for about 11.9 minutes during the 38 minutes that he was sleeping on this pressure. Patient still does take oxycodone 30 mg 4 times a day, gabapentin as well as Ambien for sleep. He indicates he needs medications of the but he really cannot function very well. Patient needs a new machine but does not like [employer] and wants to know if he can change to a different company. DIAGNOSTIC TESTS:  BiLevel Titration- 6/16/22-              PRINCESS-7 SCREENING 6/27/2022   Feeling nervous, anxious, or on edge Not at all   Not being able to stop or control worrying Not at all   Worrying too much about different things Not at all   Trouble relaxing Not at all   Being so restless that it is hard to sit still Not at all   Becoming easily annoyed or irritable Not at all   Feeling afraid as if something awful might happen Not at all   PRINCESS-7 Total Score 0   How difficult have these problems made it for you to do your work, take care of things at home, or get along with other people?  Not difficult at all       PRINCESS-7 SCREENING 6/27/2022   Feeling nervous, anxious, or on edge Not at all   Not being able to stop or control worrying Not at all   Worrying too much about different things Not at all   Trouble relaxing Not at all   Being so restless that it is hard to sit still Not at all   Becoming easily annoyed or irritable Not at all   Feeling afraid as if something awful might happen Not at all   PRINCESS-7 Total Score 0   How difficult have these problems made it for you to do your work, take care of things at home, or get along with other people? Not difficult at all       Past Medical History:   Diagnosis Date    Acquired cyst of kidney     Arthritis     generalized     Balanoposthitis     BPH (benign prostatic hypertrophy)     Chronic pain     back     Claustrophobia     Depression     Dysuria     GERD (gastroesophageal reflux disease)     managed with medication     High cholesterol     Hypertension     managed with medication     Impotence of organic origin     Obesity (BMI 30-39. 9)     Obesity (BMI 30-39. 9)     BMI 40.1    Other testicular hypofunction     Tinnitus of both ears     Type 2 diabetes mellitus (HCC)     type 2, adverage glucose 150-200, symptomatic is unknown     Unspecified adverse effect of anesthesia     woke up in middle of procedure x 2    Unspecified sleep apnea     CPAP compliant    Wears dentures     uppers         Patient Active Problem List   Diagnosis    Spinal stenosis of lumbar region with neurogenic claudication    Hypoxemia    Essential hypertension, benign    Spinal stenosis, lumbar region, with neurogenic claudication    Chest pain    Obesity (BMI 30-39. 9)    High cholesterol    COPD (chronic obstructive pulmonary disease) (HCC)    Hypocalcemia    Type 2 diabetes mellitus (HCC)    LOPEZ (dyspnea on exertion)    Obesity, morbid (HCC)    Chronic low back pain    GERD (gastroesophageal reflux disease)    MISTI treated with BiPAP    Type 2 diabetes mellitus with hyperglycemia           Past Surgical History:   Procedure Laterality Date    ANKLE FRACTURE SURGERY  5 yrs ago    right with pinning    CARPAL TUNNEL RELEASE  over 10 yrs ago    bilateral    CIRCUMCISION      ORTHOPEDIC SURGERY      L4 and 5 compression with dustin in place         Social History     Socioeconomic History    Marital status: Single     Spouse name: Not on file    Number of children: Not on file    Years of education: Not on file    Highest education level: Not on file   Occupational History    Not on file   Tobacco Use    Smoking status: Former Smoker     Packs/day: 3.00     Quit date: 10/5/1988     Years since quittin.7    Smokeless tobacco: Never Used    Tobacco comment: Quit smoking: quit    Substance and Sexual Activity    Alcohol use: Yes    Drug use: No    Sexual activity: Not on file   Other Topics Concern    Not on file   Social History Narrative    Not on file     Social Determinants of Health     Financial Resource Strain:     Difficulty of Paying Living Expenses: Not on file   Food Insecurity:     Worried About Running Out of Food in the Last Year: Not on file    Brian of Food in the Last Year: Not on file   Transportation Needs:     Lack of Transportation (Medical): Not on file    Lack of Transportation (Non-Medical):  Not on file   Physical Activity:     Days of Exercise per Week: Not on file    Minutes of Exercise per Session: Not on file   Stress:     Feeling of Stress : Not on file   Social Connections:     Frequency of Communication with Friends and Family: Not on file    Frequency of Social Gatherings with Friends and Family: Not on file    Attends Christianity Services: Not on file    Active Member of [address] [place] [address] [employer] or Organizations: Not on file    Attends Club or Organization Meetings: Not on file    Marital Status: Not on file   Intimate Partner Violence:     Fear of Current or Ex-Partner: Not on file    Emotionally Abused: Not on file    Physically Abused: Not on file    Sexually Abused: Not on file   Housing Stability:     Unable to Pay for Housing in the Last Year: Not on file    Number of [place] in the Last Year: Not on file    Unstable Housing in the Last Year: Not on file         Family History   Problem Relation Age of Onset    Diabetes Sister     Diabetes Brother     Diabetes Sister     Diabetes Mother     Stroke Father     Hypertension Mother     Diabetes Father     Hypertension Father     Stroke Mother          Allergies   Allergen Reactions    Adhesive Tape Hives     bandaids           Current Outpatient Medications   Medication Sig    albuterol (PROVENTIL) (2.5 MG/3ML) 0.083% nebulizer solution Inhale 2.5 mg into the lungs 4 times daily    amLODIPine (NORVASC) 5 MG tablet Take 5 mg by mouth    atorvastatin (LIPITOR) 40 MG tablet Take 20 mg by mouth daily    azelastine (ASTELIN) 0.1 % nasal spray 2 sprays by Nasal route 2 times daily    famotidine (PEPCID) 40 MG tablet Take 40 mg by mouth daily    Fluticasone-Umeclidin-Vilant (TRELEGY ELLIPTA) 200-62.5-25 MCG/INH AEPB Inhale 1 puff into the lungs every morning (before breakfast)    gabapentin (NEURONTIN) 300 MG capsule Take 800 mg by mouth 4 times daily.  glipiZIDE (GLUCOTROL) 10 MG tablet Take 10 mg by mouth daily    hydroCHLOROthiazide (HYDRODIURIL) 12.5 MG tablet Take 12.5 mg by mouth daily    Insulin Degludec (TRESIBA FLEXTOUCH) 100 UNIT/ML SOPN Inject 140 Units into the skin    insulin lispro (HUMALOG KWIKPEN) 200 UNIT/ML SOPN pen 25 units breakfast 35 units supper plus correction up to 80 units per day    lisinopril (PRINIVIL;ZESTRIL) 20 MG tablet Take 20 mg by mouth daily    metFORMIN (GLUCOPHAGE-XR) 500 MG extended release tablet Take 1,500 mg by mouth    naloxegol (MOVANTIK) 25 MG TABS tablet Take by mouth every morning (before breakfast)    oxyCODONE (OXY-IR) 30 MG immediate release tablet Take 30 mg by mouth every 6 hours as needed.  pantoprazole (PROTONIX) 40 MG tablet Take 40 mg by mouth 2 times daily    Sennosides 8.6 MG CAPS Take by mouth    tamsulosin (FLOMAX) 0.4 MG capsule TAKE 1 CAPSULE BY MOUTH DAILY    Vitamin E 400 units TABS Take 1 tablet by mouth    zolpidem (AMBIEN) 10 MG tablet Take 10 mg by mouth.     OXYGEN 2 lpm qhs and 2 lpm with exertion prn (Patient not taking: Reported on 6/30/2022)     No current facility-administered medications for this visit. REVIEW OF SYSTEMS:     CONSTITUTIONAL:   There is Negative history of fever, chills, night sweats. Patient is  Negativefor weight loss, they are  Negative for  weight gain. Patient is  Positive  for fatigue and hypersomnia and is  on their BiPAP. Insomnia is   under control. CARDIAC:   No chest pain, pressure, discomfort, palpitations, orthopnea, murmurs. +edema. GI:   No dysphagia, heartburn reflux, nausea/vomiting, diarrhea, abdominal pain, or bleeding. NEURO:    There is no history of AMS, persistent headache, decreased level of consciousness, seizures, or motor or sensory deficits. PHYSICAL EXAM:    Vitals:    06/30/22 1558   BP: 138/78   Pulse: 93   Resp: 15   Temp: 97.2 °F (36.2 °C)   SpO2: 93%       Constitutional:  the patient is well developed and in no acute distress  EENMT:  Sclera clear, pupils equal, oral mucosa moist, narrow oral airway Modified Higgins Stage 3, Nares are patent bilateral  Respiratory: CTA b/l. No Wheezing or Rhonchi. Cardiovascular:  RRR without M,G,R  Gastrointestinal: soft and non-tender; with positive bowel sounds. Musculoskeletal: warm without cyanosis. There is 2+ lower leg edema. Skin:  no jaundice or rashes, no visible wounds   Neurologic: no gross neuro deficits     Psychiatric:  alert and oriented x 3        ASSESSMENT/PLAN:  (Medical Decision Making)       ICD-10-CM    1. MISTI treated with BiPAP  G47.33 - patient has sleep apnea with hypoxemia and would benefit with BiPAP and oxygen as per settings below. - Went over his BiPAP titration with him today. 2. Hypoxemia  R09.02 -patient did require oxygen at 4 L/min but still does not seem to be optimal.  We will be raising up his pressure and going oxygen at 4.5 L/min and repeating the study on the new settings in about a month to make sure it is adequate     3. Obesity (BMI 30-39. 9)  E66.9 -weight loss by diet and exercise as tolerated       4. Chronic low back pain, unspecified back pain laterality, unspecified whether sciatica present  M54.50 -patient takes chronic pain medication with likely contributes to higher pressure needs and possibly oxygen needs. Would mitigate as tolerated    G89.29       .  5 insomnia  - Patient currently taking Ambien and can reassess next visit if he still needs it or not. He also takes gabapentin    SUMMARY:    Begin Nasal Mask BiPAP at 16/11 centimeters H2O with oxygen 4.5 L/min. With humidification, a 10-minute ramp time and a starting pressure of 12/8. Needs a new machine    Use nasal mask for his choice-during the BiPAP titration did use a E son nasal Mask. Overnight oximetry on BiPAP with oxygen 4.5 L/min in 1 month    NEW DME    Weight loss was encouraged through the reduction of caloric intake as well as an increase in aerobic physical activity. Continue proper sleep hygiene. Did review [place] score, and recent BiPAP titration    All questions are answered to the patient's satisfaction. The patient will call for further questions and concerns. Follow up at the [address] [place] [address] will be in 3 months to make sure he is doing well  Follow up will be with the patient's referring physician as had been previously scheduled. Stas Zambrano MD    Over 50% of today's office visit was spent in face to face time reviewing test results, prognosis, importance of compliance, education about disease process, benefits of medications, instructions for management of acute flare-ups, and follow up plans. Electronically signed and dictated. Please note if there are errors this is  likely a result of the dictation software. No orders of the defined types were placed in this encounter. No orders of the defined types were placed in this encounter.

## 2022-06-30 ENCOUNTER — OFFICE VISIT (OUTPATIENT)
Dept: SLEEP MEDICINE | Age: 67
End: 2022-06-30
Payer: MEDICARE

## 2022-06-30 VITALS
SYSTOLIC BLOOD PRESSURE: 138 MMHG | BODY MASS INDEX: 46.65 KG/M2 | DIASTOLIC BLOOD PRESSURE: 78 MMHG | TEMPERATURE: 97.2 F | WEIGHT: 315 LBS | RESPIRATION RATE: 15 BRPM | HEART RATE: 93 BPM | OXYGEN SATURATION: 93 % | HEIGHT: 69 IN

## 2022-06-30 DIAGNOSIS — M54.50 CHRONIC LOW BACK PAIN, UNSPECIFIED BACK PAIN LATERALITY, UNSPECIFIED WHETHER SCIATICA PRESENT: ICD-10-CM

## 2022-06-30 DIAGNOSIS — R09.02 HYPOXEMIA: ICD-10-CM

## 2022-06-30 DIAGNOSIS — G89.29 CHRONIC LOW BACK PAIN, UNSPECIFIED BACK PAIN LATERALITY, UNSPECIFIED WHETHER SCIATICA PRESENT: ICD-10-CM

## 2022-06-30 DIAGNOSIS — E66.9 OBESITY (BMI 30-39.9): ICD-10-CM

## 2022-06-30 DIAGNOSIS — G47.33 OSA TREATED WITH BIPAP: Primary | ICD-10-CM

## 2022-06-30 PROCEDURE — 1123F ACP DISCUSS/DSCN MKR DOCD: CPT | Performed by: INTERNAL MEDICINE

## 2022-06-30 PROCEDURE — 99214 OFFICE O/P EST MOD 30 MIN: CPT | Performed by: INTERNAL MEDICINE

## 2022-07-01 DIAGNOSIS — G47.33 OSA TREATED WITH BIPAP: Primary | ICD-10-CM

## 2022-07-27 DIAGNOSIS — D64.9 LOW HEMOGLOBIN: Primary | ICD-10-CM

## 2022-07-27 LAB
FECAL OCCULT BLOOD #2, POC: NEGATIVE
FECAL OCCULT BLOOD #3, POC: NEGATIVE
HEMOCCULT STL QL: POSITIVE
VALID INTERNAL CONTROL: YES

## 2022-07-27 PROCEDURE — 82270 OCCULT BLOOD FECES: CPT | Performed by: FAMILY MEDICINE

## 2022-08-29 ENCOUNTER — TELEPHONE (OUTPATIENT)
Dept: SLEEP MEDICINE | Age: 67
End: 2022-08-29

## 2022-08-29 NOTE — TELEPHONE ENCOUNTER
----- Message from Zacarias Huerta MD sent at 8/18/2022 11:29 AM EDT -----  Study shows he did fine on his BiPAP with oxygen 4.5 L/min. Please let the patient know.     Zacarias Huerta MD

## 2022-10-10 NOTE — PROGRESS NOTES
Patient Name:  Cisco Bence                             YOB: 1955  MRN: [id number]                                              Office Visit 10/11/2022    ASSESSMENT AND PLAN:  (Medical Decision Making)    Alexander Guzman was seen today for shortness of breath. Diagnoses and all orders for this visit:    Obesity hypoventilation syndrome ([facility]  --O2 sats improved with sitting upright rather than slouched in chair. Suspect he has an element of hypercarbia and OHS. Continue BiPAP for now. ABG today to check CO2 levels. -     Blood Gas, Arterial; Future    Hypoxemia  --borderline O2 sats with rest today. Suspect that this is related to his edema. Will check ABG. -     Blood Gas, Arterial; Future    Obstructive sleep apnea (adult) (pediatric)  --currently on BiPAP and O2 at 4.5 lpm.  Suspect hypercarbia. Will obtain ABG on room air. May need NIV if BiPAP is not sufficient. Addendum 10/12/2022 1003--Due to the severity of the patient's chronic respiratory failure, BiPAP does not supply adequate pressure or volume to effectively ventilate the patient. BiPAP has been tried and continues to have elevated pCO2 levels. Non-invasive ventilation is required for both daytime and night-time use for life sustaining measures. Failure to adequately ventilate the patient with non-invasive ventilation could result in serious harm and/or death. pCO2 on ABG 10/11/22 was 57. CO2 levels on recent BMP/CMP consistently 25-31 and thus suspect that this is a chronic problem. -     Blood Gas, Arterial; Future    Lower extremity edema  --worse--resume HCTZ. Chronic obstructive pulmonary disease, unspecified COPD type ([facility]  --air trapping on previous CPFTs--was on Trelegy, but patient stopped due to cost and lack of perceived benefit. Other orders  -     hydroCHLOROthiazide (HYDRODIURIL) 12.5 MG tablet;  Take 1 tablet by mouth daily    Orders Placed This Encounter   Medications hydroCHLOROthiazide (HYDRODIURIL) 12.5 MG tablet     Sig: Take 1 tablet by mouth daily     Dispense:  30 tablet     Refill:  1     No orders of the defined types were placed in this encounter. Follow-up and Dispositions    Return in about 3 months (around 1/11/2023) for Sine or Lory//OHS. Collaborating physician is Dr. Dima Sequeira. KARON Orourke, NP, APRN - CNP    _________________________________________________________________________    HISTORY OF PRESENT ILLNESS:    Mr. Bree Wang is a 74 y.o. male who is seen at [facility] today for  Shortness of Breath     The patient is a 72-year-old male who is seen for follow-up of shortness of breath. He has a history of DM, morbid obesity, exertional and nocturnal hypoxemia, and MISTI on BiPAP. He had CPFTs 12/29/20 which revealed moderately severe air trapping without BD response and normal DLCO. Had air trapping on chest CT. Was started on Trelegy. Follow up CPFTs done 9/24/21 revealed mild restriction and normal  DLCO. There was some evidence of dynamic airway collapse. Restriction is secondary to obesity. He stopped his Trelegy about 4 months ago due to cost and lack of perceived benefit. Is on BiPAP now and O2 at 4.5 lpm qhs. Has been on BiPAP for about 6 weeks now. Reports ongoing DHS and frequent napping. Also notes worsening LE edema. Has been off his HCTZ for about 3 months now. Is more short of breath and is having to use his O2 some during the day. REVIEW OF SYSTEMS: 10 point review of systems is negative except as reported in HPI. PHYSICAL EXAM: Body mass index is 46.37 kg/m². Vitals:    10/11/22 1537   BP: (!) 116/50   Pulse: (!) 103   Resp: 17   Temp: 97.1 °F (36.2 °C)   SpO2: 90%   Weight: (!) 314 lb (142.4 kg)   Height: 5' 9\" (1.753 m)         General:   Alert, cooperative, no distress, appears stated age. Eyes:   Conjunctivae/corneas clear.  PERRL        Mouth/Throat:  Lips, mucosa, and tongue normal. Teeth and gums normal.        Lungs:     Breath sounds decreased bilaterally, but clear     Heart:   Regular rate and rhythm, S1, S2 normal, no murmur, click, rub or gallop. Abdomen:    Soft, non-tender. Extremities:  Extremities normal, atraumatic, no cyanosis; 1-2+ LE edema. Skin:  Skin color normal. No rashes or lesions     Neurologic:  A&Ox3     DIAGNOSTIC TESTS:                                                                                    LABS:   Lab Results   Component Value Date/Time    WBC 8.4 06/27/2022 03:00 PM    HGB 12.8 06/27/2022 03:00 PM    HCT 40.5 06/27/2022 03:00 PM     06/27/2022 03:00 PM    TSH 1.060 03/12/2020 12:22 PM    ESR 4 06/18/2019 03:30 PM     Imaging: I performed an independent interpretation of the patient's images. CXR:   XR KNEE RIGHT (MIN 4 VIEWS) 06/08/2022    Narrative  AUTOMATIC ADMINISTRATIVE RESULT    The result for this exam can be found in the Progress note in the chart. Impression  See Progress note in the chart. CT Chest:   CT CHEST W CONTRAST 03/03/2021    Narrative  CT chest with contrast (pulmonary embolism protocol)    History: Shortness of breath, hypoxia    Technique: Helically acquired images were obtained from the lung apices to the  domes of the diaphragms reconstructed at 2.5mm intervals after the uneventful  administration of 100 cc's intravenous Isovue-370 in order to evaluate the  pulmonary arteries. Coronal reformatted images were submitted. Radiation dose reduction techniques were used for this study:  Our CT scanners  use one or all of the following: Automated exposure control, adjustment of the  mA and/or kVp according to patient's size, iterative reconstruction. Comparison: 04/20/2018    Findings: There is no pleural or pericardial effusion. The heart and great  vessels are unremarkable. There are no filling defects within the pulmonary  arteries to suggest pulmonary emboli. There are no airspace opacities. There are  no pulmonary nodules. No adenopathy is present within the thorax. Moderate  hypertrophic changes are present involving the thoracic spine. Impression  No evidence of pulmonary embolism. [facility] Reference Info:                                                                                                                  Past Medical History:   Diagnosis Date    Acquired cyst of kidney     Arthritis     generalized     Balanoposthitis     BPH (benign prostatic hypertrophy)     Chronic pain     back     Claustrophobia     Depression     Dysuria     GERD (gastroesophageal reflux disease)     managed with medication     High cholesterol     Hypertension     managed with medication     Impotence of organic origin     Obesity (BMI 30-39. 9)     Obesity (BMI 30-39. 9)     BMI 40.1    Other testicular hypofunction     Tinnitus of both ears     Type 2 diabetes mellitus (HCC)     type 2, adverage glucose 150-200, symptomatic is unknown     Unspecified adverse effect of anesthesia     woke up in middle of procedure x 2    Unspecified sleep apnea     CPAP compliant    Wears dentures     uppers        Tobacco Use      Smoking status: Former        Packs/day: 3.00        Types: Cigarettes        Quit date: 10/5/1988        Years since quittin.0      Smokeless tobacco: Never      Tobacco comments: Quit smoking: quit     Allergies   Allergen Reactions    Adhesive Tape Hives     bandaids       Current Outpatient Medications   Medication Instructions    albuterol (PROVENTIL) 2.5 mg, 4 TIMES DAILY    amLODIPine (NORVASC) 5 mg, Oral    atorvastatin (LIPITOR) 20 mg, Oral, DAILY    azelastine (ASTELIN) 0.1 % nasal spray 2 sprays, Nasal, 2 TIMES DAILY    famotidine (PEPCID) 40 mg, Oral, DAILY    gabapentin (NEURONTIN) 800 mg, Oral, 4 TIMES DAILY    glipiZIDE (GLUCOTROL) 10 mg, Oral, DAILY    hydroCHLOROthiazide (HYDRODIURIL) 12.5 mg, Oral, DAILY    insulin lispro (HUMALOG KWIKPEN) 200 UNIT/ML SOPN pen 25 units breakfast 35 units supper plus correction up to 80 units per day    lisinopril (PRINIVIL;ZESTRIL) 20 mg, Oral, DAILY    metFORMIN (GLUCOPHAGE-XR) 1,500 mg, Oral    naloxegol (MOVANTIK) 25 MG TABS tablet Oral, DAILY BEFORE BREAKFAST    oxyCODONE (OXY-IR) 30 mg, Oral, EVERY 6 HOURS PRN    OXYGEN 2 lpm qhs and 2 lpm with exertion prn    pantoprazole (PROTONIX) 40 mg, Oral, 2 TIMES DAILY    Sennosides 8.6 MG CAPS Oral    tamsulosin (FLOMAX) 0.4 MG capsule TAKE 1 CAPSULE BY MOUTH DAILY    Tresiba FlexTouch 140 Units, SubCUTAneous    Vitamin E 400 units TABS 1 tablet, Oral    zolpidem (AMBIEN) 10 mg, Oral

## 2022-10-11 ENCOUNTER — OFFICE VISIT (OUTPATIENT)
Dept: PULMONOLOGY | Age: 67
End: 2022-10-11
Payer: MEDICARE

## 2022-10-11 ENCOUNTER — TELEPHONE (OUTPATIENT)
Dept: PULMONOLOGY | Age: 67
End: 2022-10-11

## 2022-10-11 ENCOUNTER — HOSPITAL ENCOUNTER (OUTPATIENT)
Dept: LAB | Age: 67
Discharge: HOME | End: 2022-10-14
Payer: MEDICARE

## 2022-10-11 VITALS
DIASTOLIC BLOOD PRESSURE: 50 MMHG | HEIGHT: 69 IN | RESPIRATION RATE: 17 BRPM | WEIGHT: 314 LBS | HEART RATE: 103 BPM | SYSTOLIC BLOOD PRESSURE: 116 MMHG | TEMPERATURE: 97.1 F | OXYGEN SATURATION: 90 % | BODY MASS INDEX: 46.51 KG/M2

## 2022-10-11 DIAGNOSIS — J44.9 CHRONIC OBSTRUCTIVE PULMONARY DISEASE, UNSPECIFIED COPD TYPE (HCC): ICD-10-CM

## 2022-10-11 DIAGNOSIS — G47.33 OBSTRUCTIVE SLEEP APNEA (ADULT) (PEDIATRIC): ICD-10-CM

## 2022-10-11 DIAGNOSIS — J44.9 CHRONIC OBSTRUCTIVE PULMONARY DISEASE, UNSPECIFIED COPD TYPE (HCC): Primary | ICD-10-CM

## 2022-10-11 DIAGNOSIS — R09.02 HYPOXEMIA: ICD-10-CM

## 2022-10-11 DIAGNOSIS — G47.33 OSA TREATED WITH BIPAP: ICD-10-CM

## 2022-10-11 DIAGNOSIS — E66.2 OBESITY HYPOVENTILATION SYNDROME (HCC): ICD-10-CM

## 2022-10-11 DIAGNOSIS — J98.4 RESTRICTIVE LUNG DISEASE: ICD-10-CM

## 2022-10-11 DIAGNOSIS — R60.0 LOWER EXTREMITY EDEMA: ICD-10-CM

## 2022-10-11 DIAGNOSIS — J96.12 CHRONIC RESPIRATORY FAILURE WITH HYPOXIA AND HYPERCAPNIA (HCC): ICD-10-CM

## 2022-10-11 DIAGNOSIS — J96.11 CHRONIC RESPIRATORY FAILURE WITH HYPOXIA AND HYPERCAPNIA (HCC): ICD-10-CM

## 2022-10-11 DIAGNOSIS — E66.2 OBESITY HYPOVENTILATION SYNDROME (HCC): Primary | ICD-10-CM

## 2022-10-11 LAB
ARTERIAL PATENCY WRIST A: POSITIVE
BASE EXCESS BLD CALC-SCNC: 7 MMOL/L
BDY SITE: ABNORMAL
GAS FLOW.O2 O2 DELIVERY SYS: ABNORMAL L/MIN
HCO3 BLD-SCNC: 34.1 MMOL/L (ref 22–26)
O2/TOTAL GAS SETTING VFR VENT: 21 %
PCO2 BLD: 56.9 MMHG (ref 35–45)
PH BLD: 7.39 [PH] (ref 7.35–7.45)
PO2 BLD: 56 MMHG (ref 75–100)
SAO2 % BLD: 87.3 % (ref 95–98)
SERVICE CMNT-IMP: ABNORMAL
SPECIMEN TYPE: ABNORMAL

## 2022-10-11 PROCEDURE — 1123F ACP DISCUSS/DSCN MKR DOCD: CPT | Performed by: NURSE PRACTITIONER

## 2022-10-11 PROCEDURE — 82803 BLOOD GASES ANY COMBINATION: CPT

## 2022-10-11 PROCEDURE — 36600 WITHDRAWAL OF ARTERIAL BLOOD: CPT

## 2022-10-11 PROCEDURE — 99214 OFFICE O/P EST MOD 30 MIN: CPT | Performed by: NURSE PRACTITIONER

## 2022-10-11 RX ORDER — HYDROCHLOROTHIAZIDE 12.5 MG/1
12.5 TABLET ORAL DAILY
Qty: 30 TABLET | Refills: 1 | Status: SHIPPED | OUTPATIENT
Start: 2022-10-11

## 2022-10-12 ENCOUNTER — OFFICE VISIT (OUTPATIENT)
Dept: FAMILY MEDICINE CLINIC | Facility: CLINIC | Age: 67
End: 2022-10-12
Payer: MEDICARE

## 2022-10-12 VITALS — BODY MASS INDEX: 46.06 KG/M2 | HEIGHT: 69 IN | WEIGHT: 311 LBS

## 2022-10-12 DIAGNOSIS — E11.9 TYPE 2 DIABETES MELLITUS WITHOUT COMPLICATION, WITH LONG-TERM CURRENT USE OF INSULIN (HCC): ICD-10-CM

## 2022-10-12 DIAGNOSIS — I10 ESSENTIAL HYPERTENSION, BENIGN: Primary | ICD-10-CM

## 2022-10-12 DIAGNOSIS — H69.83 DYSFUNCTION OF BOTH EUSTACHIAN TUBES: ICD-10-CM

## 2022-10-12 DIAGNOSIS — K21.9 GASTROESOPHAGEAL REFLUX DISEASE, UNSPECIFIED WHETHER ESOPHAGITIS PRESENT: ICD-10-CM

## 2022-10-12 DIAGNOSIS — E83.51 HYPOCALCEMIA: ICD-10-CM

## 2022-10-12 DIAGNOSIS — H91.93 BILATERAL HEARING LOSS, UNSPECIFIED HEARING LOSS TYPE: ICD-10-CM

## 2022-10-12 DIAGNOSIS — Z79.4 TYPE 2 DIABETES MELLITUS WITHOUT COMPLICATION, WITH LONG-TERM CURRENT USE OF INSULIN (HCC): ICD-10-CM

## 2022-10-12 DIAGNOSIS — E11.65 TYPE 2 DIABETES MELLITUS WITH HYPERGLYCEMIA, WITH LONG-TERM CURRENT USE OF INSULIN (HCC): ICD-10-CM

## 2022-10-12 DIAGNOSIS — E78.00 HIGH CHOLESTEROL: ICD-10-CM

## 2022-10-12 DIAGNOSIS — J41.0 SIMPLE CHRONIC BRONCHITIS (HCC): ICD-10-CM

## 2022-10-12 DIAGNOSIS — G47.33 OSA TREATED WITH BIPAP: ICD-10-CM

## 2022-10-12 DIAGNOSIS — Z00.00 ROUTINE GENERAL MEDICAL EXAMINATION AT A HEALTH CARE FACILITY: ICD-10-CM

## 2022-10-12 DIAGNOSIS — E66.9 OBESITY (BMI 30-39.9): ICD-10-CM

## 2022-10-12 DIAGNOSIS — Z79.4 TYPE 2 DIABETES MELLITUS WITH HYPERGLYCEMIA, WITH LONG-TERM CURRENT USE OF INSULIN (HCC): ICD-10-CM

## 2022-10-12 DIAGNOSIS — R53.83 FATIGUE, UNSPECIFIED TYPE: ICD-10-CM

## 2022-10-12 DIAGNOSIS — D64.9 LOW HEMOGLOBIN: ICD-10-CM

## 2022-10-12 LAB
BASOPHILS # BLD: 0.1 K/UL (ref 0–0.2)
BASOPHILS NFR BLD: 1 % (ref 0–2)
DIFFERENTIAL METHOD BLD: ABNORMAL
EOSINOPHIL # BLD: 0.2 K/UL (ref 0–0.8)
EOSINOPHIL NFR BLD: 3 % (ref 0.5–7.8)
ERYTHROCYTE [DISTWIDTH] IN BLOOD BY AUTOMATED COUNT: 12.6 % (ref 11.9–14.6)
HCT VFR BLD AUTO: 43.6 % (ref 41.1–50.3)
HGB BLD-MCNC: 13.4 G/DL (ref 13.6–17.2)
IMM GRANULOCYTES # BLD AUTO: 0 K/UL (ref 0–0.5)
IMM GRANULOCYTES NFR BLD AUTO: 1 % (ref 0–5)
LYMPHOCYTES # BLD: 1.5 K/UL (ref 0.5–4.6)
LYMPHOCYTES NFR BLD: 20 % (ref 13–44)
MCH RBC QN AUTO: 28.5 PG (ref 26.1–32.9)
MCHC RBC AUTO-ENTMCNC: 30.7 G/DL (ref 31.4–35)
MCV RBC AUTO: 92.8 FL (ref 82–102)
MONOCYTES # BLD: 0.6 K/UL (ref 0.1–1.3)
MONOCYTES NFR BLD: 7 % (ref 4–12)
NEUTS SEG # BLD: 5.4 K/UL (ref 1.7–8.2)
NEUTS SEG NFR BLD: 69 % (ref 43–78)
NRBC # BLD: 0 K/UL (ref 0–0.2)
PLATELET # BLD AUTO: 198 K/UL (ref 150–450)
PMV BLD AUTO: 11.5 FL (ref 9.4–12.3)
RBC # BLD AUTO: 4.7 M/UL (ref 4.23–5.6)
WBC # BLD AUTO: 7.7 K/UL (ref 4.3–11.1)

## 2022-10-12 PROCEDURE — 3052F HG A1C>EQUAL 8.0%<EQUAL 9.0%: CPT | Performed by: FAMILY MEDICINE

## 2022-10-12 PROCEDURE — 99214 OFFICE O/P EST MOD 30 MIN: CPT | Performed by: FAMILY MEDICINE

## 2022-10-12 PROCEDURE — 1123F ACP DISCUSS/DSCN MKR DOCD: CPT | Performed by: FAMILY MEDICINE

## 2022-10-12 PROCEDURE — G0439 PPPS, SUBSEQ VISIT: HCPCS | Performed by: FAMILY MEDICINE

## 2022-10-12 RX ORDER — AZELASTINE 1 MG/ML
1 SPRAY, METERED NASAL 2 TIMES DAILY
Qty: 60 ML | Refills: 1 | Status: SHIPPED | OUTPATIENT
Start: 2022-10-12

## 2022-10-12 RX ORDER — GABAPENTIN 400 MG/1
400 CAPSULE ORAL 2 TIMES DAILY
COMMUNITY
Start: 2022-07-17

## 2022-10-12 RX ORDER — MELOXICAM 15 MG/1
TABLET ORAL
COMMUNITY
Start: 2022-08-30

## 2022-10-12 RX ORDER — ATORVASTATIN CALCIUM 20 MG/1
TABLET, FILM COATED ORAL
COMMUNITY
Start: 2022-08-26

## 2022-10-12 RX ORDER — EMPAGLIFLOZIN 10 MG/1
TABLET, FILM COATED ORAL
COMMUNITY
Start: 2022-10-09 | End: 2022-11-03

## 2022-10-12 RX ORDER — NALOXONE HYDROCHLORIDE 4 MG/.1ML
SPRAY NASAL
COMMUNITY
Start: 2022-10-10

## 2022-10-12 ASSESSMENT — ENCOUNTER SYMPTOMS
EYE DISCHARGE: 0
WHEEZING: 0
EYE PAIN: 0
PHOTOPHOBIA: 0
CHOKING: 0
EYE ITCHING: 0
EYE REDNESS: 0
COUGH: 0

## 2022-10-12 ASSESSMENT — PATIENT HEALTH QUESTIONNAIRE - PHQ9
SUM OF ALL RESPONSES TO PHQ QUESTIONS 1-9: 0
2. FEELING DOWN, DEPRESSED OR HOPELESS: 0
SUM OF ALL RESPONSES TO PHQ9 QUESTIONS 1 & 2: 0
SUM OF ALL RESPONSES TO PHQ QUESTIONS 1-9: 0
1. LITTLE INTEREST OR PLEASURE IN DOING THINGS: 0

## 2022-10-12 ASSESSMENT — LIFESTYLE VARIABLES
HOW OFTEN DO YOU HAVE A DRINK CONTAINING ALCOHOL: NEVER
HOW MANY STANDARD DRINKS CONTAINING ALCOHOL DO YOU HAVE ON A TYPICAL DAY: PATIENT DOES NOT DRINK

## 2022-10-12 NOTE — TELEPHONE ENCOUNTER
Called Keli Cole at [facility] and left message about patient needing BiPAP.  ([phone])

## 2022-10-12 NOTE — TELEPHONE ENCOUNTER
Spoke with Pura Bernstein at [facility]. They will work with patient about switching out BiPAP. Please place order in [employer] for Trilogy device--titrate pressures for patient comfort, diagnosis--chronic respiratory failure with hypercarbia and hypoxemia, OHS, MISTI, restrictive lung disease, COPD.

## 2022-10-13 LAB
ALBUMIN SERPL-MCNC: 3.7 G/DL (ref 3.2–4.6)
ALBUMIN/GLOB SERPL: 1.3 {RATIO} (ref 0.4–1.6)
ALP SERPL-CCNC: 81 U/L (ref 50–136)
ALT SERPL-CCNC: 46 U/L (ref 12–65)
ANION GAP SERPL CALC-SCNC: 3 MMOL/L (ref 2–11)
AST SERPL-CCNC: 27 U/L (ref 15–37)
BILIRUB SERPL-MCNC: 0.3 MG/DL (ref 0.2–1.1)
BUN SERPL-MCNC: 10 MG/DL (ref 8–23)
CALCIUM SERPL-MCNC: 8.9 MG/DL (ref 8.3–10.4)
CHLORIDE SERPL-SCNC: 102 MMOL/L (ref 101–110)
CHOLEST SERPL-MCNC: 119 MG/DL
CO2 SERPL-SCNC: 34 MMOL/L (ref 21–32)
CREAT SERPL-MCNC: 0.8 MG/DL (ref 0.8–1.5)
GLOBULIN SER CALC-MCNC: 2.8 G/DL (ref 2.8–4.5)
GLUCOSE SERPL-MCNC: 232 MG/DL (ref 65–100)
HDLC SERPL-MCNC: 34 MG/DL (ref 40–60)
HDLC SERPL: 3.5 {RATIO}
LDLC SERPL CALC-MCNC: 40 MG/DL
POTASSIUM SERPL-SCNC: 4.9 MMOL/L (ref 3.5–5.1)
PROT SERPL-MCNC: 6.5 G/DL (ref 6.3–8.2)
SODIUM SERPL-SCNC: 139 MMOL/L (ref 133–143)
TRIGL SERPL-MCNC: 225 MG/DL (ref 35–150)
TSH, 3RD GENERATION: 0.51 UIU/ML (ref 0.36–3.74)
VLDLC SERPL CALC-MCNC: 45 MG/DL (ref 6–23)

## 2022-10-13 NOTE — PROGRESS NOTES
general, how would you say your health is?: Fair  In the past 7 days, have you experienced any of the following: New or Increased Pain, New or Increased Fatigue, Loneliness, Social Isolation, Stress or Anger?: (!) Yes  Select all that apply: (!) New or Increased Pain, New or Increased Fatigue  Do you get the social and emotional support that you need?: Yes  Do you have a Living Will?: (!) No    Advance Directives       Power of  Living Will ACP-Advance Directive ACP-Power of     Not on File Not on File Not on File Not on File          General Health Risk Interventions:  Poor self-assessment of health status: patient advised to follow-up in this office for further evaluation and treatment of severe HTN within 4 month(s)    Health Habits/Nutrition:  Physical Activity: Inactive    Days of Exercise per Week: 0 days    Minutes of Exercise per Session: 0 min     Have you lost any weight without trying in the past 3 months?: No  Body mass index: (!) 45.92  Have you seen the dentist within the past year?: Yes  Health Habits/Nutrition Interventions:  Inadequate physical activity:  patient agrees to increase physical activity as follows: increase walking     Safety:  Do you have working smoke detectors?: Yes  Do you have any tripping hazards - loose or unsecured carpets or rugs?: (!) Yes  Do you have any tripping hazards - clutter in doorways, halls, or stairs?: No  Do you have either shower bars, grab bars, non-slip mats or non-slip surfaces in your shower or bathtub?: (!) No  Do all of your stairways have a railing or banister?: Yes  Do you always fasten your seatbelt when you are in a car?: Yes  Safety Interventions:  Home safety tips provided           Objective   Vitals:    10/12/22 1440   Weight: (!) 311 lb (141.1 kg)   Height: 5' 9\" (1.753 m)      Body mass index is 45.93 kg/m².       General Appearance: alert and oriented to person, place and time, well developed and well- nourished, in no acute distress  Skin: warm and dry, no rash or erythema  Head: normocephalic and atraumatic  Eyes: pupils equal, round, and reactive to light, extraocular eye movements intact, conjunctivae normal  ENT: tympanic membrane, external ear and ear canal normal bilaterally, nose without deformity, nasal mucosa and turbinates normal without polyps  Neck: supple and non-tender without mass, no thyromegaly or thyroid nodules, no cervical lymphadenopathy  Pulmonary/Chest: clear to auscultation bilaterally- no wheezes, rales or rhonchi, normal air movement, no respiratory distress  Cardiovascular: normal rate, regular rhythm, normal S1 and S2, no murmurs, rubs, clicks, or gallops, distal pulses intact, no carotid bruits  Abdomen: soft, non-tender, non-distended, normal bowel sounds, no masses or organomegaly  Extremities: no cyanosis, clubbing or edema  Musculoskeletal: normal range of motion, no joint swelling, deformity or tenderness  Neurologic: reflexes normal and symmetric, no cranial nerve deficit, gait, coordination and speech normal       Allergies   Allergen Reactions    Adhesive Tape Hives     bandaids       Prior to Visit Medications    Medication Sig Taking?  Authorizing Provider   naloxone 4 MG/0.1ML LIQD nasal spray  Yes Historical Provider, MD   meloxicam (MOBIC) 15 MG tablet TAKE 1 TABLET BY MOUTH EVERY DAY WITH FOOD Yes Historical Provider, MD   gabapentin (NEURONTIN) 400 MG capsule TAKE 1 CAPSULE BY MOUTH 4 TIMES A DAY AS DIRECTED Yes Historical Provider, MD   JARDIANCE 10 MG tablet  Yes Historical Provider, MD   atorvastatin (LIPITOR) 20 MG tablet  Yes Historical Provider, MD   azelastine (ASTELIN) 0.1 % nasal spray 1 spray by Nasal route 2 times daily Use in each nostril as directed Yes Dari Mcdermott MD   hydroCHLOROthiazide (HYDRODIURIL) 12.5 MG tablet Take 1 tablet by mouth daily Yes DENA Cardenas - CNP   OXYGEN 2 lpm qhs and 2 lpm with exertion prn Yes Ar Automatic Reconciliation   albuterol (PROVENTIL) (2.5 MG/3ML) 0.083% nebulizer solution Inhale 2.5 mg into the lungs 4 times daily Yes Ar Automatic Reconciliation   amLODIPine (NORVASC) 5 MG tablet Take 5 mg by mouth Yes Ar Automatic Reconciliation   azelastine (ASTELIN) 0.1 % nasal spray 2 sprays by Nasal route 2 times daily Yes Ar Automatic Reconciliation   famotidine (PEPCID) 40 MG tablet Take 40 mg by mouth daily Yes Ar Automatic Reconciliation   gabapentin (NEURONTIN) 300 MG capsule Take 800 mg by mouth 4 times daily. Yes Ar Automatic Reconciliation   glipiZIDE (GLUCOTROL) 10 MG tablet Take 10 mg by mouth daily Yes Ar Automatic Reconciliation   Insulin Degludec (TRESIBA FLEXTOUCH) 100 UNIT/ML SOPN Inject 140 Units into the skin Yes Ar Automatic Reconciliation   insulin lispro (HUMALOG KWIKPEN) 200 UNIT/ML SOPN pen 25 units breakfast 35 units supper plus correction up to 80 units per day Yes Ar Automatic Reconciliation   lisinopril (PRINIVIL;ZESTRIL) 20 MG tablet Take 20 mg by mouth daily Yes Ar Automatic Reconciliation   metFORMIN (GLUCOPHAGE-XR) 500 MG extended release tablet Take 1,500 mg by mouth Yes Ar Automatic Reconciliation   naloxegol (MOVANTIK) 25 MG TABS tablet Take by mouth every morning (before breakfast) Yes Ar Automatic Reconciliation   oxyCODONE (OXY-IR) 30 MG immediate release tablet Take 30 mg by mouth every 6 hours as needed. Yes Ar Automatic Reconciliation   pantoprazole (PROTONIX) 40 MG tablet Take 40 mg by mouth 2 times daily Yes Ar Automatic Reconciliation   Sennosides 8.6 MG CAPS Take by mouth Yes Ar Automatic Reconciliation   tamsulosin (FLOMAX) 0.4 MG capsule TAKE 1 CAPSULE BY MOUTH DAILY Yes Ar Automatic Reconciliation   Vitamin E 400 units TABS Take 1 tablet by mouth Yes Ar Automatic Reconciliation   zolpidem (AMBIEN) 10 MG tablet Take 10 mg by mouth.  Yes Ar Automatic Reconciliation       CareTeam (Including outside providers/suppliers regularly involved in providing care):   Patient Care Team:  Silke Rojas MD as PCP - Dennie Boatman, MD as PCP - [facility] Empaneled Provider     Reviewed and updated this visit:  Tobacco  Allergies  Meds  Problems  Med Hx  Surg Hx  Soc Hx  Fam Hx

## 2022-10-13 NOTE — PATIENT INSTRUCTIONS
Personalized Preventive Plan for Nata Ace - 10/12/2022  Medicare offers a range of preventive health benefits. Some of the tests and screenings are paid in full while other may be subject to a deductible, co-insurance, and/or copay. Some of these benefits include a comprehensive review of your medical history including lifestyle, illnesses that may run in your family, and various assessments and screenings as appropriate. After reviewing your medical record and screening and assessments performed today your provider may have ordered immunizations, labs, imaging, and/or referrals for you. A list of these orders (if applicable) as well as your Preventive Care list are included within your After Visit Summary for your review. Other Preventive Recommendations:    A preventive eye exam performed by an eye specialist is recommended every 1-2 years to screen for glaucoma; cataracts, macular degeneration, and other eye disorders. A preventive dental visit is recommended every 6 months. Try to get at least 150 minutes of exercise per week or 10,000 steps per day on a pedometer . Order or download the FREE \"Exercise & Physical Activity: Your Everyday Guide\" from [employer]. Call 9-942.865.9042 or search [employer] online. You need 9867-2084 mg of calcium and 3366-0621 IU of vitamin D per day. It is possible to meet your calcium requirement with diet alone, but a vitamin D supplement is usually necessary to meet this goal.  When exposed to the sun, use a sunscreen that protects against both UVA and UVB radiation with an SPF of 30 or greater. Reapply every 2 to 3 hours or after sweating, drying off with a towel, or swimming. Always wear a seat belt when traveling in a car. Always wear a helmet when riding a bicycle or motorcycle.

## 2022-10-13 NOTE — PROGRESS NOTES
[address] [place] [address]  _______________________________________  Michell Hagan MD                 [address] [place] [address],  [address]. MD Racheal [facility]                                                                                    Phone: [phone]                                                                                    Fax: [phone]    Gayathri Martinez is a 74 y.o. male who is seen for evaluation of   Chief Complaint   Patient presents with    Diabetes    Cholesterol Problem    Hypertension    Medicare AWV       HPI:   Diabetes  He presents for his follow-up diabetic visit. He has type 2 diabetes mellitus. Onset time: DM with poor control lately and also poor compliance with exercise recommendations. Associated symptoms include fatigue. Hypertension  Progression since onset: poor control of DM today, needs reiflls and labs, not on HCTZ lately, feels like that is when he started to feel worse. Hyperlipidemia  This is a chronic problem. Condition status: last check showed fasting labs good, need refills today. Fatigue  Episode onset: low energy, needs rehcekc labs. Associated symptoms include fatigue. Pertinent negatives include no chills or coughing. Chief Complaint   Patient presents with    Diabetes    Cholesterol Problem    Hypertension    Medicare AWV         Review of Systems:    Review of Systems   Constitutional:  Positive for fatigue. Negative for activity change, appetite change and chills. HENT: Negative. Eyes:  Negative for photophobia, pain, discharge, redness and itching. Respiratory:  Negative for cough, choking and wheezing. Cardiovascular: Negative. Genitourinary: Negative.       History:  Past Medical History:   Diagnosis Date    Acquired cyst of kidney     Arthritis     generalized     Balanoposthitis     BPH (benign prostatic hypertrophy)     Chronic pain     back     Claustrophobia     Depression     Dysuria     GERD (gastroesophageal reflux disease)     managed with medication     High cholesterol     Hypertension     managed with medication     Impotence of organic origin     Obesity (BMI 30-39. 9)     Obesity (BMI 30-39. 9)     BMI 40.1    Other testicular hypofunction     Tinnitus of both ears     Type 2 diabetes mellitus (HCC)     type 2, adverage glucose 150-200, symptomatic is unknown     Unspecified adverse effect of anesthesia     woke up in middle of procedure x 2    Unspecified sleep apnea     CPAP compliant    Wears dentures     uppers       Past Surgical History:   Procedure Laterality Date    ANKLE FRACTURE SURGERY  5 yrs ago    right with pinning    CARPAL TUNNEL RELEASE  over 10 yrs ago    bilateral    CIRCUMCISION      ORTHOPEDIC SURGERY      L4 and 5 compression with dustin in place       Family History   Problem Relation Age of Onset    Diabetes Sister     Diabetes Brother     Diabetes Sister     Diabetes Mother     Stroke Father     Hypertension Mother     Diabetes Father     Hypertension Father     Stroke Mother        Social History     Tobacco Use    Smoking status: Former     Packs/day: 3.00     Types: Cigarettes     Quit date: 10/5/1988     Years since quittin.0    Smokeless tobacco: Never    Tobacco comments:     Quit smoking: quit    Substance Use Topics    Alcohol use:  Yes       Allergies   Allergen Reactions    Adhesive Tape Hives     bandaids         Current Outpatient Medications   Medication Sig Dispense Refill    naloxone 4 MG/0.1ML LIQD nasal spray       meloxicam (MOBIC) 15 MG tablet TAKE 1 TABLET BY MOUTH EVERY DAY WITH FOOD      gabapentin (NEURONTIN) 400 MG capsule TAKE 1 CAPSULE BY MOUTH 4 TIMES A DAY AS DIRECTED      JARDIANCE 10 MG tablet       atorvastatin (LIPITOR) 20 MG tablet       azelastine (ASTELIN) 0.1 % nasal spray 1 spray by Nasal route 2 times daily Use in each nostril as directed 60 mL 1    hydroCHLOROthiazide (HYDRODIURIL) 12.5 MG tablet Take 1 tablet by mouth daily 30 tablet 1    OXYGEN 2 lpm qhs and 2 lpm with exertion prn      albuterol (PROVENTIL) (2.5 MG/3ML) 0.083% nebulizer solution Inhale 2.5 mg into the lungs 4 times daily      amLODIPine (NORVASC) 5 MG tablet Take 5 mg by mouth      azelastine (ASTELIN) 0.1 % nasal spray 2 sprays by Nasal route 2 times daily      famotidine (PEPCID) 40 MG tablet Take 40 mg by mouth daily      gabapentin (NEURONTIN) 300 MG capsule Take 800 mg by mouth 4 times daily.  glipiZIDE (GLUCOTROL) 10 MG tablet Take 10 mg by mouth daily      Insulin Degludec (TRESIBA FLEXTOUCH) 100 UNIT/ML SOPN Inject 140 Units into the skin      insulin lispro (HUMALOG KWIKPEN) 200 UNIT/ML SOPN pen 25 units breakfast 35 units supper plus correction up to 80 units per day      lisinopril (PRINIVIL;ZESTRIL) 20 MG tablet Take 20 mg by mouth daily      metFORMIN (GLUCOPHAGE-XR) 500 MG extended release tablet Take 1,500 mg by mouth      naloxegol (MOVANTIK) 25 MG TABS tablet Take by mouth every morning (before breakfast)      oxyCODONE (OXY-IR) 30 MG immediate release tablet Take 30 mg by mouth every 6 hours as needed.  pantoprazole (PROTONIX) 40 MG tablet Take 40 mg by mouth 2 times daily      Sennosides 8.6 MG CAPS Take by mouth      tamsulosin (FLOMAX) 0.4 MG capsule TAKE 1 CAPSULE BY MOUTH DAILY      Vitamin E 400 units TABS Take 1 tablet by mouth      zolpidem (AMBIEN) 10 MG tablet Take 10 mg by mouth. No current facility-administered medications for this visit. Vitals:    Ht 5' 9\" (1.753 m)   Wt (!) 311 lb (141.1 kg)   BMI 45.93 kg/m²     Physical Exam:  Physical Exam  Vitals and nursing note reviewed. Constitutional:       Appearance: He is obese. He is ill-appearing. He is not diaphoretic. HENT:      Head: Normocephalic and atraumatic. Nose: Nose normal.   Eyes:      Pupils: Pupils are equal, round, and reactive to light. Cardiovascular:      Rate and Rhythm: Normal rate and regular rhythm. Pulses: Normal pulses. Heart sounds: Normal heart sounds. Pulmonary:      Effort: Pulmonary effort is normal.      Breath sounds: Normal breath sounds. Musculoskeletal:         General: No swelling or tenderness. Normal range of motion. Cervical back: Normal range of motion and neck supple. Skin:     General: Skin is warm and dry. Findings: No erythema or rash. Neurological:      General: No focal deficit present. Mental Status: He is alert and oriented to person, place, and time. Mental status is at baseline. Psychiatric:         Mood and Affect: Mood normal.     Assessment/Plan:     ICD-10-CM    1. Essential hypertension, benign  I10 CBC with Auto Differential     Comprehensive Metabolic Panel     Comprehensive Metabolic Panel     CBC with Auto Differential      2. Low hemoglobin  D64.9       3. Type 2 diabetes mellitus with hyperglycemia, with long-term current use of insulin ([facility])  E11.65     Z79.4       4. Simple chronic bronchitis ([facility])  J41.0       5. MISTI treated with BiPAP  G47.33       6. Gastroesophageal reflux disease, unspecified whether esophagitis present  K21.9       7. Type 2 diabetes mellitus without complication, with long-term current use of insulin ([facility])  E11.9     Z79.4       8. Obesity (BMI 30-39. 9)  E66.9       9. High cholesterol  E78.00 Lipid Panel     Lipid Panel      10. Hypocalcemia  E83.51       11. Fatigue, unspecified type  R53.83 TSH     TSH      12. Dysfunction of both eustachian tubes  H69.83 azelastine (ASTELIN) 0.1 % nasal spray      13. Bilateral hearing loss, unspecified hearing loss type  H91.93 Ambulatory referral to ENT      14. Routine general medical examination at a health care facility  Z00.00         Changing issue: HTN severe in past few days, has some hA and fatigue and malaise, see above, restarting HCTZ and caution about salt and other diet recommendationg. Labs pending  Meds sent  Labs sent  Encourage diet and exercise   Encourage weight loss,  Encourage home sugar monitoring  Call if problems  Recheck 3 months     Eliecer Coronado MD

## 2022-10-23 ENCOUNTER — APPOINTMENT (OUTPATIENT)
Dept: GENERAL RADIOLOGY | Age: 67
End: 2022-10-23
Payer: MEDICARE

## 2022-10-23 ENCOUNTER — HOSPITAL ENCOUNTER (EMERGENCY)
Age: 67
Discharge: ANOTHER HOSPITAL | End: 2022-10-23
Attending: EMERGENCY MEDICINE
Payer: MEDICARE

## 2022-10-23 ENCOUNTER — HOSPITAL ENCOUNTER (INPATIENT)
Age: 67
LOS: 4 days | Discharge: HOME | DRG: 193 | End: 2022-10-27
Attending: FAMILY MEDICINE | Admitting: FAMILY MEDICINE
Payer: MEDICARE

## 2022-10-23 VITALS
DIASTOLIC BLOOD PRESSURE: 66 MMHG | HEART RATE: 96 BPM | HEIGHT: 69 IN | BODY MASS INDEX: 45.91 KG/M2 | OXYGEN SATURATION: 90 % | TEMPERATURE: 99.9 F | WEIGHT: 310 LBS | RESPIRATION RATE: 20 BRPM | SYSTOLIC BLOOD PRESSURE: 112 MMHG

## 2022-10-23 DIAGNOSIS — J44.1 COPD WITH ACUTE EXACERBATION (HCC): ICD-10-CM

## 2022-10-23 DIAGNOSIS — J10.1 INFLUENZA A: ICD-10-CM

## 2022-10-23 DIAGNOSIS — J96.01 ACUTE RESPIRATORY FAILURE WITH HYPOXIA (HCC): Primary | ICD-10-CM

## 2022-10-23 PROBLEM — I10 ESSENTIAL HYPERTENSION, BENIGN: Status: ACTIVE | Noted: 2017-08-31

## 2022-10-23 PROBLEM — J44.9 COPD (CHRONIC OBSTRUCTIVE PULMONARY DISEASE) (HCC): Status: ACTIVE | Noted: 2021-03-04

## 2022-10-23 PROBLEM — G89.29 CHRONIC LOW BACK PAIN: Status: ACTIVE | Noted: 2018-04-19

## 2022-10-23 PROBLEM — E66.01 OBESITY, MORBID (HCC): Status: ACTIVE | Noted: 2017-12-01

## 2022-10-23 PROBLEM — J11.1 FLU: Status: ACTIVE | Noted: 2022-10-23

## 2022-10-23 PROBLEM — M54.50 CHRONIC LOW BACK PAIN: Status: ACTIVE | Noted: 2018-04-19

## 2022-10-23 LAB
ALBUMIN SERPL-MCNC: 4.2 G/DL (ref 3.2–4.6)
ALBUMIN/GLOB SERPL: 1.4 {RATIO} (ref 0.4–1.6)
ALP SERPL-CCNC: 71 U/L (ref 45–117)
ALT SERPL-CCNC: 31 U/L (ref 13–61)
ANION GAP SERPL CALC-SCNC: 10 MMOL/L (ref 2–11)
AST SERPL-CCNC: 49 U/L (ref 15–37)
BASOPHILS # BLD: 0 K/UL (ref 0–0.2)
BASOPHILS NFR BLD: 0 % (ref 0–2)
BILIRUB SERPL-MCNC: 0.3 MG/DL (ref 0.2–1.1)
BUN SERPL-MCNC: 19 MG/DL (ref 7–18)
CALCIUM SERPL-MCNC: 8.7 MG/DL (ref 8.3–10.4)
CHLORIDE SERPL-SCNC: 96 MMOL/L (ref 98–107)
CO2 SERPL-SCNC: 33 MMOL/L (ref 21–32)
CREAT SERPL-MCNC: 0.84 MG/DL (ref 0.8–1.5)
DIFFERENTIAL METHOD BLD: ABNORMAL
EOSINOPHIL # BLD: 0 K/UL (ref 0–0.8)
EOSINOPHIL NFR BLD: 0 % (ref 0.5–7.8)
ERYTHROCYTE [DISTWIDTH] IN BLOOD BY AUTOMATED COUNT: 13.3 % (ref 11.9–14.6)
FLUAV AG NPH QL IA: POSITIVE
FLUBV AG NPH QL IA: NEGATIVE
GLOBULIN SER CALC-MCNC: 3 G/DL (ref 2.8–4.5)
GLUCOSE BLD STRIP.AUTO-MCNC: 320 MG/DL (ref 65–100)
GLUCOSE SERPL-MCNC: 170 MG/DL (ref 65–100)
HCT VFR BLD AUTO: 42.9 % (ref 41.1–50.3)
HGB BLD-MCNC: 13.8 G/DL (ref 13.6–17.2)
IMM GRANULOCYTES # BLD AUTO: 0 K/UL (ref 0–0.5)
IMM GRANULOCYTES NFR BLD AUTO: 0 % (ref 0–5)
LYMPHOCYTES # BLD: 1.1 K/UL (ref 0.5–4.6)
LYMPHOCYTES NFR BLD: 19 % (ref 13–44)
MAGNESIUM SERPL-MCNC: 1.8 MG/DL (ref 1.2–2.6)
MCH RBC QN AUTO: 28.5 PG (ref 26.1–32.9)
MCHC RBC AUTO-ENTMCNC: 32.2 G/DL (ref 31.4–35)
MCV RBC AUTO: 88.5 FL (ref 82–102)
MONOCYTES # BLD: 0.7 K/UL (ref 0.1–1.3)
MONOCYTES NFR BLD: 12 % (ref 4–12)
NEUTS SEG # BLD: 4 K/UL (ref 1.7–8.2)
NEUTS SEG NFR BLD: 69 % (ref 43–78)
NRBC # BLD: 0 K/UL (ref 0–0.2)
PLATELET # BLD AUTO: 209 K/UL (ref 150–450)
PMV BLD AUTO: 10.9 FL (ref 9.4–12.3)
POTASSIUM SERPL-SCNC: 4.5 MMOL/L (ref 3.5–5.1)
PROT SERPL-MCNC: 7.2 G/DL (ref 6.4–8.2)
RBC # BLD AUTO: 4.85 M/UL (ref 4.23–5.6)
SARS-COV-2 RDRP RESP QL NAA+PROBE: NOT DETECTED
SERVICE CMNT-IMP: ABNORMAL
SODIUM SERPL-SCNC: 139 MMOL/L (ref 133–143)
SOURCE: NORMAL
SPECIMEN SOURCE: ABNORMAL
WBC # BLD AUTO: 5.8 K/UL (ref 4.3–11.1)

## 2022-10-23 PROCEDURE — 71045 X-RAY EXAM CHEST 1 VIEW: CPT

## 2022-10-23 PROCEDURE — 94640 AIRWAY INHALATION TREATMENT: CPT

## 2022-10-23 PROCEDURE — 80053 COMPREHEN METABOLIC PANEL: CPT

## 2022-10-23 PROCEDURE — 6370000000 HC RX 637 (ALT 250 FOR IP): Performed by: EMERGENCY MEDICINE

## 2022-10-23 PROCEDURE — 96374 THER/PROPH/DIAG INJ IV PUSH: CPT

## 2022-10-23 PROCEDURE — 99285 EMERGENCY DEPT VISIT HI MDM: CPT

## 2022-10-23 PROCEDURE — 6370000000 HC RX 637 (ALT 250 FOR IP): Performed by: FAMILY MEDICINE

## 2022-10-23 PROCEDURE — 2580000003 HC RX 258: Performed by: FAMILY MEDICINE

## 2022-10-23 PROCEDURE — 82962 GLUCOSE BLOOD TEST: CPT

## 2022-10-23 PROCEDURE — 85025 COMPLETE CBC W/AUTO DIFF WBC: CPT

## 2022-10-23 PROCEDURE — 87635 SARS-COV-2 COVID-19 AMP PRB: CPT

## 2022-10-23 PROCEDURE — 94760 N-INVAS EAR/PLS OXIMETRY 1: CPT

## 2022-10-23 PROCEDURE — 2700000000 HC OXYGEN THERAPY PER DAY

## 2022-10-23 PROCEDURE — 1100000000 HC RM PRIVATE

## 2022-10-23 PROCEDURE — 83735 ASSAY OF MAGNESIUM: CPT

## 2022-10-23 PROCEDURE — 87804 INFLUENZA ASSAY W/OPTIC: CPT

## 2022-10-23 PROCEDURE — 6360000002 HC RX W HCPCS: Performed by: EMERGENCY MEDICINE

## 2022-10-23 PROCEDURE — 87040 BLOOD CULTURE FOR BACTERIA: CPT

## 2022-10-23 RX ORDER — ONDANSETRON 2 MG/ML
4 INJECTION INTRAMUSCULAR; INTRAVENOUS EVERY 6 HOURS PRN
Status: DISCONTINUED | OUTPATIENT
Start: 2022-10-23 | End: 2022-10-27 | Stop reason: HOSPADM

## 2022-10-23 RX ORDER — SODIUM CHLORIDE 0.9 % (FLUSH) 0.9 %
5-40 SYRINGE (ML) INJECTION PRN
Status: DISCONTINUED | OUTPATIENT
Start: 2022-10-23 | End: 2022-10-27 | Stop reason: HOSPADM

## 2022-10-23 RX ORDER — PANTOPRAZOLE SODIUM 40 MG/1
40 TABLET, DELAYED RELEASE ORAL 2 TIMES DAILY
Status: DISCONTINUED | OUTPATIENT
Start: 2022-10-23 | End: 2022-10-27 | Stop reason: HOSPADM

## 2022-10-23 RX ORDER — ALBUTEROL SULFATE 2.5 MG/3ML
SOLUTION RESPIRATORY (INHALATION)
Status: DISCONTINUED
Start: 2022-10-23 | End: 2022-10-23 | Stop reason: HOSPADM

## 2022-10-23 RX ORDER — GABAPENTIN 400 MG/1
400 CAPSULE ORAL 4 TIMES DAILY
Status: DISCONTINUED | OUTPATIENT
Start: 2022-10-23 | End: 2022-10-27 | Stop reason: HOSPADM

## 2022-10-23 RX ORDER — METHYLPREDNISOLONE SODIUM SUCCINATE 125 MG/2ML
125 INJECTION, POWDER, LYOPHILIZED, FOR SOLUTION INTRAMUSCULAR; INTRAVENOUS
Status: COMPLETED | OUTPATIENT
Start: 2022-10-23 | End: 2022-10-23

## 2022-10-23 RX ORDER — TAMSULOSIN HYDROCHLORIDE 0.4 MG/1
0.4 CAPSULE ORAL DAILY
Status: DISCONTINUED | OUTPATIENT
Start: 2022-10-24 | End: 2022-10-27 | Stop reason: HOSPADM

## 2022-10-23 RX ORDER — ATORVASTATIN CALCIUM 10 MG/1
20 TABLET, FILM COATED ORAL NIGHTLY
Status: DISCONTINUED | OUTPATIENT
Start: 2022-10-23 | End: 2022-10-27 | Stop reason: HOSPADM

## 2022-10-23 RX ORDER — OSELTAMIVIR PHOSPHATE 75 MG/1
75 CAPSULE ORAL 2 TIMES DAILY
Status: DISCONTINUED | OUTPATIENT
Start: 2022-10-23 | End: 2022-10-23

## 2022-10-23 RX ORDER — SODIUM CHLORIDE 9 MG/ML
INJECTION, SOLUTION INTRAVENOUS PRN
Status: DISCONTINUED | OUTPATIENT
Start: 2022-10-23 | End: 2022-10-27 | Stop reason: HOSPADM

## 2022-10-23 RX ORDER — AZELASTINE 1 MG/ML
1 SPRAY, METERED NASAL 2 TIMES DAILY
Status: DISCONTINUED | OUTPATIENT
Start: 2022-10-23 | End: 2022-10-24

## 2022-10-23 RX ORDER — MELOXICAM 7.5 MG/1
15 TABLET ORAL DAILY
Status: DISCONTINUED | OUTPATIENT
Start: 2022-10-24 | End: 2022-10-27 | Stop reason: HOSPADM

## 2022-10-23 RX ORDER — OXYCODONE HYDROCHLORIDE 15 MG/1
30 TABLET ORAL EVERY 6 HOURS PRN
Status: DISCONTINUED | OUTPATIENT
Start: 2022-10-23 | End: 2022-10-25

## 2022-10-23 RX ORDER — AMLODIPINE BESYLATE 5 MG/1
5 TABLET ORAL DAILY
Status: DISCONTINUED | OUTPATIENT
Start: 2022-10-24 | End: 2022-10-27 | Stop reason: HOSPADM

## 2022-10-23 RX ORDER — HYDROCHLOROTHIAZIDE 25 MG/1
12.5 TABLET ORAL DAILY
Status: DISCONTINUED | OUTPATIENT
Start: 2022-10-24 | End: 2022-10-27 | Stop reason: HOSPADM

## 2022-10-23 RX ORDER — ALBUTEROL SULFATE 2.5 MG/3ML
2.5 SOLUTION RESPIRATORY (INHALATION) 4 TIMES DAILY
Status: DISCONTINUED | OUTPATIENT
Start: 2022-10-23 | End: 2022-10-23

## 2022-10-23 RX ORDER — INSULIN LISPRO 100 [IU]/ML
0-8 INJECTION, SOLUTION INTRAVENOUS; SUBCUTANEOUS
Status: DISCONTINUED | OUTPATIENT
Start: 2022-10-24 | End: 2022-10-27 | Stop reason: HOSPADM

## 2022-10-23 RX ORDER — ALBUTEROL SULFATE 2.5 MG/3ML
2.5 SOLUTION RESPIRATORY (INHALATION) 4 TIMES DAILY
Status: DISCONTINUED | OUTPATIENT
Start: 2022-10-24 | End: 2022-10-27 | Stop reason: HOSPADM

## 2022-10-23 RX ORDER — POLYETHYLENE GLYCOL 3350 17 G/17G
17 POWDER, FOR SOLUTION ORAL DAILY PRN
Status: DISCONTINUED | OUTPATIENT
Start: 2022-10-23 | End: 2022-10-27 | Stop reason: HOSPADM

## 2022-10-23 RX ORDER — INSULIN GLARGINE 100 [IU]/ML
56 INJECTION, SOLUTION SUBCUTANEOUS 2 TIMES DAILY
Status: DISCONTINUED | OUTPATIENT
Start: 2022-10-23 | End: 2022-10-27 | Stop reason: HOSPADM

## 2022-10-23 RX ORDER — ONDANSETRON 4 MG/1
4 TABLET, ORALLY DISINTEGRATING ORAL EVERY 8 HOURS PRN
Status: DISCONTINUED | OUTPATIENT
Start: 2022-10-23 | End: 2022-10-27 | Stop reason: HOSPADM

## 2022-10-23 RX ORDER — FAMOTIDINE 20 MG/1
40 TABLET, FILM COATED ORAL DAILY
Status: DISCONTINUED | OUTPATIENT
Start: 2022-10-24 | End: 2022-10-27 | Stop reason: HOSPADM

## 2022-10-23 RX ORDER — INSULIN LISPRO 100 [IU]/ML
25 INJECTION, SOLUTION INTRAVENOUS; SUBCUTANEOUS 2 TIMES DAILY WITH MEALS
Status: DISCONTINUED | OUTPATIENT
Start: 2022-10-24 | End: 2022-10-27 | Stop reason: HOSPADM

## 2022-10-23 RX ORDER — ZOLPIDEM TARTRATE 5 MG/1
10 TABLET ORAL NIGHTLY PRN
Status: DISCONTINUED | OUTPATIENT
Start: 2022-10-23 | End: 2022-10-27 | Stop reason: HOSPADM

## 2022-10-23 RX ORDER — ENOXAPARIN SODIUM 100 MG/ML
30 INJECTION SUBCUTANEOUS 2 TIMES DAILY
Status: DISCONTINUED | OUTPATIENT
Start: 2022-10-24 | End: 2022-10-27 | Stop reason: HOSPADM

## 2022-10-23 RX ORDER — INSULIN LISPRO 100 [IU]/ML
0-4 INJECTION, SOLUTION INTRAVENOUS; SUBCUTANEOUS NIGHTLY
Status: DISCONTINUED | OUTPATIENT
Start: 2022-10-23 | End: 2022-10-27 | Stop reason: HOSPADM

## 2022-10-23 RX ORDER — ACETAMINOPHEN 650 MG/1
650 SUPPOSITORY RECTAL EVERY 6 HOURS PRN
Status: DISCONTINUED | OUTPATIENT
Start: 2022-10-23 | End: 2022-10-27 | Stop reason: HOSPADM

## 2022-10-23 RX ORDER — SODIUM CHLORIDE 0.9 % (FLUSH) 0.9 %
5-40 SYRINGE (ML) INJECTION EVERY 12 HOURS SCHEDULED
Status: DISCONTINUED | OUTPATIENT
Start: 2022-10-23 | End: 2022-10-27 | Stop reason: HOSPADM

## 2022-10-23 RX ORDER — IPRATROPIUM BROMIDE AND ALBUTEROL SULFATE 2.5; .5 MG/3ML; MG/3ML
1 SOLUTION RESPIRATORY (INHALATION)
Status: COMPLETED | OUTPATIENT
Start: 2022-10-23 | End: 2022-10-23

## 2022-10-23 RX ORDER — ACETAMINOPHEN 325 MG/1
650 TABLET ORAL EVERY 6 HOURS PRN
Status: DISCONTINUED | OUTPATIENT
Start: 2022-10-23 | End: 2022-10-27 | Stop reason: HOSPADM

## 2022-10-23 RX ORDER — LISINOPRIL 20 MG/1
20 TABLET ORAL DAILY
Status: DISCONTINUED | OUTPATIENT
Start: 2022-10-24 | End: 2022-10-27 | Stop reason: HOSPADM

## 2022-10-23 RX ORDER — DEXTROSE MONOHYDRATE 100 MG/ML
INJECTION, SOLUTION INTRAVENOUS CONTINUOUS PRN
Status: DISCONTINUED | OUTPATIENT
Start: 2022-10-23 | End: 2022-10-27 | Stop reason: HOSPADM

## 2022-10-23 RX ORDER — ALBUTEROL SULFATE 2.5 MG/3ML
2.5 SOLUTION RESPIRATORY (INHALATION)
Status: COMPLETED | OUTPATIENT
Start: 2022-10-23 | End: 2022-10-23

## 2022-10-23 RX ADMIN — INSULIN LISPRO 4 UNITS: 100 INJECTION, SOLUTION INTRAVENOUS; SUBCUTANEOUS at 22:03

## 2022-10-23 RX ADMIN — ALBUTEROL SULFATE 2.5 MG: 2.5 SOLUTION RESPIRATORY (INHALATION) at 20:00

## 2022-10-23 RX ADMIN — GABAPENTIN 400 MG: 400 CAPSULE ORAL at 21:57

## 2022-10-23 RX ADMIN — INSULIN GLARGINE 56 UNITS: 100 INJECTION, SOLUTION SUBCUTANEOUS at 22:04

## 2022-10-23 RX ADMIN — IPRATROPIUM BROMIDE AND ALBUTEROL SULFATE 1 AMPULE: .5; 3 SOLUTION RESPIRATORY (INHALATION) at 18:00

## 2022-10-23 RX ADMIN — PANTOPRAZOLE SODIUM 40 MG: 40 TABLET, DELAYED RELEASE ORAL at 21:57

## 2022-10-23 RX ADMIN — OXYCODONE HYDROCHLORIDE 30 MG: 15 TABLET ORAL at 21:57

## 2022-10-23 RX ADMIN — ATORVASTATIN CALCIUM 20 MG: 10 TABLET, FILM COATED ORAL at 21:57

## 2022-10-23 RX ADMIN — SODIUM CHLORIDE, PRESERVATIVE FREE 10 ML: 5 INJECTION INTRAVENOUS at 21:58

## 2022-10-23 RX ADMIN — METHYLPREDNISOLONE SODIUM SUCCINATE 125 MG: 125 INJECTION, POWDER, FOR SOLUTION INTRAMUSCULAR; INTRAVENOUS at 18:07

## 2022-10-23 ASSESSMENT — PAIN DESCRIPTION - LOCATION: LOCATION: BACK

## 2022-10-23 ASSESSMENT — PAIN - FUNCTIONAL ASSESSMENT: PAIN_FUNCTIONAL_ASSESSMENT: NONE - DENIES PAIN

## 2022-10-23 ASSESSMENT — ENCOUNTER SYMPTOMS: COUGH: 1

## 2022-10-23 ASSESSMENT — PAIN SCALES - GENERAL
PAINLEVEL_OUTOF10: 6
PAINLEVEL_OUTOF10: 4

## 2022-10-23 NOTE — ED NOTES
Patient unhooked  Himself from monitors and O2 and ambulated to bathroom independently with steady gait. Patient's work of breathing has increased slightly, but patient does not appear to be in distress.       Rosa Becker RN  10/23/22 3603

## 2022-10-23 NOTE — ED PROVIDER NOTES
Emergency Department Provider Note                   PCP:                Milly Boyd MD               Age: 74 y.o. Sex: male       ICD-10-CM    1. Acute respiratory failure with hypoxia (HCC)  J96.01       2. COPD with acute exacerbation ([facility]  J44.1       3. Influenza A  J10.1           DISPOSITION Decision To Transfer 10/23/2022 06:56:16 PM        MDM  Number of Diagnoses or Management Options  Acute respiratory failure with hypoxia ([facility]): new, needed workup  COPD with acute exacerbation ([facility]): new, needed workup  Influenza A: new, needed workup     Amount and/or Complexity of Data Reviewed  Clinical lab tests: ordered and reviewed  Tests in the radiology section of CPT®: ordered and reviewed  Tests in the medicine section of CPT®: ordered and reviewed  Review and summarize past medical records: yes  Discuss the patient with other providers: yes  Independent visualization of images, tracings, or specimens: yes    Risk of Complications, Morbidity, and/or Mortality  Presenting problems: high  Diagnostic procedures: moderate  Management options: high    Critical Care  Total time providing critical care: 30-74 minutes    Patient Progress  Patient progress: improved       ED Course as of 10/23/22 1900   Sun Oct 23, 2022   1722 ECG interpretation for ECG dated 23 October 2022 at 4:56 PM: ECG reveals sinus tachycardia at a rate of 102 bpm, normal IL and QTc intervals normal axis. Low voltage precordial leads. Borderline ECG.   Yaima Silvestre MD   [BB]      ED Course User Index  [BB] Yaima Silvestre MD        Orders Placed This Encounter   Procedures    COVID-19, Rapid    Rapid influenza A/B antigens    Culture, Blood 1    Culture, Blood 1    XR CHEST PORTABLE    CBC with Auto Differential    Comprehensive Metabolic Panel    Magnesium    Cardiac Monitor - ED Only    Continuous Pulse Oximetry    EKG 12 Lead    Saline lock IV        Medications   ipratropium-albuterol (DUONEB) nebulizer solution 1 ampule (1 ampule Inhalation Given 10/23/22 1800)   methylPREDNISolone sodium (SOLU-MEDROL) injection 125 mg (125 mg IntraVENous Given 10/23/22 1807)       New Prescriptions    No medications on file        Carlene Sales is a 74 y.o. male who presents to the Emergency Department with chief complaint of  No chief complaint on file. 63-year-old male with history of COPD, O2 dependent at night with CPAP, presents with 3-day history of nasal congestion, cough, fatigue, increased shortness of breath with increased wheeze, and generalized body aches. Patient's significant other is also in the ER with the same symptoms starting a day later. Patient states he has not smoked since the 1990s, and states he rarely uses his nebulizer machine. He denies any chest pain, nausea or vomiting. The history is provided by the patient. Cough  Cough characteristics:  Non-productive  Severity:  Moderate  Onset quality:  Gradual  Duration:  3 days  Timing:  Intermittent  Progression:  Worsening  Chronicity:  New  Smoker: no    Context: sick contacts    Relieved by:  Nothing  Worsened by: Activity, deep breathing and lying down  Ineffective treatments:  None tried  Associated symptoms: chills, fever, rhinorrhea, shortness of breath, sinus congestion and wheezing    Associated symptoms: no chest pain, no diaphoresis, no ear fullness, no ear pain, no eye discharge, no headaches, no myalgias, no rash, no sore throat and no weight loss        Review of Systems   Constitutional:  Positive for chills and fever. Negative for diaphoresis and weight loss. HENT:  Positive for rhinorrhea. Negative for ear pain and sore throat. Eyes:  Negative for discharge. Respiratory:  Positive for cough, shortness of breath and wheezing. Cardiovascular:  Negative for chest pain. Musculoskeletal:  Negative for myalgias. Skin:  Negative for rash. Neurological:  Negative for headaches. All other systems reviewed and are negative.     Past Medical History:   Diagnosis Date    Acquired cyst of kidney     Arthritis     generalized     Balanoposthitis     BPH (benign prostatic hypertrophy)     Chronic pain     back     Claustrophobia     Depression     Dysuria     GERD (gastroesophageal reflux disease)     managed with medication     High cholesterol     Hypertension     managed with medication     Impotence of organic origin     Obesity (BMI 30-39. 9)     Obesity (BMI 30-39. 9)     BMI 40.1    Other testicular hypofunction     Tinnitus of both ears     Type 2 diabetes mellitus (HCC)     type 2, adverage glucose 150-200, symptomatic is unknown     Unspecified adverse effect of anesthesia     woke up in middle of procedure x 2    Unspecified sleep apnea     CPAP compliant    Wears dentures     uppers        Past Surgical History:   Procedure Laterality Date    ANKLE FRACTURE SURGERY  5 yrs ago    right with pinning    CARPAL TUNNEL RELEASE  over 10 yrs ago    bilateral    CIRCUMCISION      ORTHOPEDIC SURGERY      L4 and 5 compression with dustin in place        Family History   Problem Relation Age of Onset    Diabetes Sister     Diabetes Brother     Diabetes Sister     Diabetes Mother     Stroke Father     Hypertension Mother     Diabetes Father     Hypertension Father     Stroke Mother         Social History     Socioeconomic History    Marital status: Single     Spouse name: None    Number of children: None    Years of education: None    Highest education level: None   Tobacco Use    Smoking status: Former     Packs/day: 3.00     Types: Cigarettes     Quit date: 10/5/1988     Years since quittin.0    Smokeless tobacco: Never    Tobacco comments:     Quit smoking: quit    Substance and Sexual Activity    Alcohol use: Yes    Drug use: No     Social Determinants of Health     Physical Activity: Inactive    Days of Exercise per Week: 0 days    Minutes of Exercise per Session: 0 min         Adhesive tape     Previous Medications    ALBUTEROL (PROVENTIL) (2.5 MG/3ML) 0.083% NEBULIZER SOLUTION    Inhale 2.5 mg into the lungs 4 times daily    AMLODIPINE (NORVASC) 5 MG TABLET    Take 5 mg by mouth    ATORVASTATIN (LIPITOR) 20 MG TABLET        AZELASTINE (ASTELIN) 0.1 % NASAL SPRAY    2 sprays by Nasal route 2 times daily    AZELASTINE (ASTELIN) 0.1 % NASAL SPRAY    1 spray by Nasal route 2 times daily Use in each nostril as directed    FAMOTIDINE (PEPCID) 40 MG TABLET    Take 40 mg by mouth daily    GABAPENTIN (NEURONTIN) 300 MG CAPSULE    Take 800 mg by mouth 4 times daily. GABAPENTIN (NEURONTIN) 400 MG CAPSULE    TAKE 1 CAPSULE BY MOUTH 4 TIMES A DAY AS DIRECTED    GLIPIZIDE (GLUCOTROL) 10 MG TABLET    Take 10 mg by mouth daily    HYDROCHLOROTHIAZIDE (HYDRODIURIL) 12.5 MG TABLET    Take 1 tablet by mouth daily    INSULIN DEGLUDEC (TRESIBA FLEXTOUCH) 100 UNIT/ML SOPN    Inject 140 Units into the skin    INSULIN LISPRO (HUMALOG KWIKPEN) 200 UNIT/ML SOPN PEN    25 units breakfast 35 units supper plus correction up to 80 units per day    JARDIANCE 10 MG TABLET        LISINOPRIL (PRINIVIL;ZESTRIL) 20 MG TABLET    Take 20 mg by mouth daily    MELOXICAM (MOBIC) 15 MG TABLET    TAKE 1 TABLET BY MOUTH EVERY DAY WITH FOOD    METFORMIN (GLUCOPHAGE-XR) 500 MG EXTENDED RELEASE TABLET    Take 1,500 mg by mouth    NALOXEGOL (MOVANTIK) 25 MG TABS TABLET    Take by mouth every morning (before breakfast)    NALOXONE 4 MG/0.1ML LIQD NASAL SPRAY        OXYCODONE (OXY-IR) 30 MG IMMEDIATE RELEASE TABLET    Take 30 mg by mouth every 6 hours as needed. OXYGEN    2 lpm qhs and 2 lpm with exertion prn    PANTOPRAZOLE (PROTONIX) 40 MG TABLET    Take 40 mg by mouth 2 times daily    SENNOSIDES 8.6 MG CAPS    Take by mouth    TAMSULOSIN (FLOMAX) 0.4 MG CAPSULE    TAKE 1 CAPSULE BY MOUTH DAILY    VITAMIN E 400 UNITS TABS    Take 1 tablet by mouth    ZOLPIDEM (AMBIEN) 10 MG TABLET    Take 10 mg by mouth. Vitals signs and nursing note reviewed.    Patient Vitals for the past 4 hrs: Temp Pulse Resp BP SpO2   10/23/22 1839 -- (!) 104 19 (!) 122/99 90 %   10/23/22 1814 -- (!) 106 16 -- 92 %   10/23/22 1800 -- 97 22 112/64 91 %   10/23/22 1745 -- 99 19 113/65 (!) 82 %   10/23/22 1730 -- 100 15 132/65 96 %   10/23/22 1715 -- (!) 102 -- 101/66 95 %   10/23/22 1700 -- (!) 105 13 112/65 93 %   10/23/22 1645 -- -- -- (!) 108/55 (!) 89 %   10/23/22 1632 -- -- -- 104/77 (!) 86 %   10/23/22 1629 99.9 °F (37.7 °C) (!) 105 22 -- --          Physical Exam  Vitals and nursing note reviewed. Constitutional:       General: He is in acute distress. Appearance: He is obese. HENT:      Head: Normocephalic and atraumatic. Right Ear: Tympanic membrane and external ear normal.      Left Ear: Tympanic membrane and external ear normal.      Nose: Nose normal.      Mouth/Throat:      Mouth: Mucous membranes are moist.   Eyes:      Extraocular Movements: Extraocular movements intact. Conjunctiva/sclera: Conjunctivae normal.      Pupils: Pupils are equal, round, and reactive to light. Cardiovascular:      Rate and Rhythm: Normal rate and regular rhythm. Heart sounds: No murmur heard. Pulmonary:      Effort: Tachypnea and accessory muscle usage present. Breath sounds: Wheezing (all fields) present. No decreased breath sounds, rhonchi or rales. Abdominal:      General: Bowel sounds are normal.      Palpations: Abdomen is soft. Tenderness: There is no abdominal tenderness. Musculoskeletal:         General: Normal range of motion. Cervical back: Normal range of motion and neck supple. Skin:     General: Skin is warm and dry. Neurological:      General: No focal deficit present. Mental Status: He is alert and oriented to person, place, and time.    Psychiatric:         Mood and Affect: Mood normal.         Behavior: Behavior normal.        Procedures    Results for orders placed or performed during the hospital encounter of 10/23/22   COVID-19, Rapid    Specimen: Nasopharyngeal   Result Value Ref Range    Source Nasopharyngeal      SARS-CoV-2, Rapid Not detected NOTD     Rapid influenza A/B antigens    Specimen: Nasal Washing   Result Value Ref Range    Influenza A Ag Positive (A) NEG      Influenza B Ag Negative NEG      Source Nasopharyngeal     XR CHEST PORTABLE    Narrative    CHEST X-RAY, single portable view  10/23/2022    History: Shortness of breath. Technique: Single frontal view of the chest.    Comparison: Chest x-ray 12/29/2020    Findings: The cardiac silhouette is mildly enlarged although stable. The lungs are  expanded without evidence for pneumothorax. No evolving consolidative airspace  process or pleural effusion is seen. Impression    1. Stable mild cardiomegaly. This report was made using voice transcription. Despite my best efforts to avoid  any, transcription errors may persist. If there is any question about the  accuracy of the report or need for clarification, then please call [phone], or text me through [employer] for clarification or correction.     CBC with Auto Differential   Result Value Ref Range    WBC 5.8 4.3 - 11.1 K/uL    RBC 4.85 4.23 - 5.60 M/uL    Hemoglobin 13.8 13.6 - 17.2 g/dL    Hematocrit 42.9 41.1 - 50.3 %    MCV 88.5 82.0 - 102.0 FL    MCH 28.5 26.1 - 32.9 PG    MCHC 32.2 31.4 - 35.0 g/dL    RDW 13.3 11.9 - 14.6 %    Platelets 890 233 - 356 K/uL    MPV 10.9 9.4 - 12.3 FL    nRBC 0.00 0.0 - 0.2 K/uL    Differential Type AUTOMATED      Seg Neutrophils 69 43 - 78 %    Lymphocytes 19 13 - 44 %    Monocytes 12 4.0 - 12.0 %    Eosinophils % 0 (L) 0.5 - 7.8 %    Basophils 0 0.0 - 2.0 %    Immature Granulocytes 0 0.0 - 5.0 %    Segs Absolute 4.0 1.7 - 8.2 K/UL    Absolute Lymph # 1.1 0.5 - 4.6 K/UL    Absolute Mono # 0.7 0.1 - 1.3 K/UL    Absolute Eos # 0.0 0.0 - 0.8 K/UL    Basophils Absolute 0.0 0.0 - 0.2 K/UL    Absolute Immature Granulocyte 0.0 0.0 - 0.5 K/UL   Comprehensive Metabolic Panel   Result Value Ref Range    Sodium 139 133 - 143 mmol/L    Potassium 4.5 3.5 - 5.1 mmol/L    Chloride 96 (L) 98 - 107 mmol/L    CO2 33 (H) 21 - 32 mmol/L    Anion Gap 10 2 - 11 mmol/L    Glucose 170 (H) 65 - 100 mg/dL    BUN 19 (H) 7.0 - 18.0 MG/DL    Creatinine 0.84 0.8 - 1.5 MG/DL    Est, Glom Filt Rate >60 >60 ml/min/1.73m2    Calcium 8.7 8.3 - 10.4 MG/DL    Total Bilirubin 0.3 0.2 - 1.1 MG/DL    ALT 31 13.0 - 61.0 U/L    AST 49 (H) 15 - 37 U/L    Alk Phosphatase 71 45.0 - 117.0 U/L    Total Protein 7.2 6.4 - 8.2 g/dL    Albumin 4.2 3.2 - 4.6 g/dL    Globulin 3.0 2.8 - 4.5 g/dL    Albumin/Globulin Ratio 1.4 0.4 - 1.6     Magnesium   Result Value Ref Range    Magnesium 1.8 1.2 - 2.6 mg/dL        XR CHEST PORTABLE   Final Result   1. Stable mild cardiomegaly. This report was made using voice transcription. Despite my best efforts to avoid   any, transcription errors may persist. If there is any question about the   accuracy of the report or need for clarification, then please call [phone], or text me through [employer] for clarification or correction.          ===================================================================  This patient is critically ill and there is a high probability of of imminent or life threatening deterioration in the patient's condition without immediate management. The nature of the patient's clinical problem is: Acute respiratory failure with hypoxia, influenza a    I have spent 45 minutes in direct patient care, documentation, review of labs/xrays/old records, discussion with Family, Staff, Colleague, Nursing . The time involved in the performance of separately reportable procedures was not counted toward critical care time. Cesario Lowe MD; 10/24/2022 @7:33 PM  ===================================================================         Voice dictation software was used during the making of this note.   This software is not perfect and grammatical and other typographical errors may be present. This note has not been completely proofread for errors.      Abdias Champagne MD  10/24/22 0566

## 2022-10-23 NOTE — ED TRIAGE NOTES
\"I've been sick since Friday. I thought it thought it was a cold but my sugar was high.  It was 260 about 30 minutes\"

## 2022-10-23 NOTE — ED NOTES
Blood Culture drawn and 2nd staff member assisted (audited) Spencer Fitzgerald, EUSEBIO  10/23/22 1898

## 2022-10-23 NOTE — ED NOTES
Blood Culture drawn and 2nd staff member assisted (audited) Tomas Kidney RN     Suresh Abreu, RN  10/23/22 9446

## 2022-10-23 NOTE — ED NOTES
TRANSFER - OUT REPORT:    Verbal report given to Khloe Urbano RN on [employer]  being transferred to [address] [place] [address] for routine progression of patient care       Report consisted of patient's Situation, Background, Assessment and   Recommendations(SBAR). Information from the following report(s) Nurse Handoff Report, ED Encounter Summary, ED SBAR, Adult Overview, Recent Results, Med Rec Status and Cardiac Rhythm Sinus rhythm with PVCs was reviewed with the receiving nurse. Lines:   Peripheral IV 10/23/22 Right Antecubital (Active)        Opportunity for questions and clarification was provided.       Patient transported with:  Monitor, O2 @ 2lpm and Tech       Elvia Santos RN  10/23/22 5098

## 2022-10-24 ENCOUNTER — APPOINTMENT (OUTPATIENT)
Dept: GENERAL RADIOLOGY | Age: 67
DRG: 193 | End: 2022-10-24
Attending: FAMILY MEDICINE
Payer: MEDICARE

## 2022-10-24 PROBLEM — R07.9 CHEST PAIN: Status: ACTIVE | Noted: 2018-04-19

## 2022-10-24 PROBLEM — E66.813 CLASS 3 SEVERE OBESITY DUE TO EXCESS CALORIES WITH SERIOUS COMORBIDITY AND BODY MASS INDEX (BMI) OF 45.0 TO 49.9 IN ADULT: Status: ACTIVE | Noted: 2017-12-01

## 2022-10-24 LAB
ALBUMIN SERPL-MCNC: 3.3 G/DL (ref 3.2–4.6)
ALBUMIN/GLOB SERPL: 1 {RATIO} (ref 0.4–1.6)
ALP SERPL-CCNC: 66 U/L (ref 50–136)
ALT SERPL-CCNC: 42 U/L (ref 12–65)
ANION GAP SERPL CALC-SCNC: 7 MMOL/L (ref 2–11)
ARTERIAL PATENCY WRIST A: POSITIVE
AST SERPL-CCNC: 30 U/L (ref 15–37)
BASE EXCESS BLD CALC-SCNC: 5.7 MMOL/L
BASOPHILS # BLD: 0 K/UL (ref 0–0.2)
BASOPHILS NFR BLD: 0 % (ref 0–2)
BDY SITE: ABNORMAL
BILIRUB SERPL-MCNC: 0.4 MG/DL (ref 0.2–1.1)
BUN SERPL-MCNC: 21 MG/DL (ref 8–23)
CALCIUM SERPL-MCNC: 8.4 MG/DL (ref 8.3–10.4)
CHLORIDE SERPL-SCNC: 94 MMOL/L (ref 101–110)
CO2 SERPL-SCNC: 33 MMOL/L (ref 21–32)
CREAT SERPL-MCNC: 1.08 MG/DL (ref 0.8–1.5)
DIFFERENTIAL METHOD BLD: ABNORMAL
EKG ATRIAL RATE: 93 BPM
EKG DIAGNOSIS: NORMAL
EKG P AXIS: 32 DEGREES
EKG P-R INTERVAL: 182 MS
EKG Q-T INTERVAL: 352 MS
EKG QRS DURATION: 84 MS
EKG QTC CALCULATION (BAZETT): 437 MS
EKG R AXIS: 61 DEGREES
EKG T AXIS: 52 DEGREES
EKG VENTRICULAR RATE: 93 BPM
EOSINOPHIL # BLD: 0 K/UL (ref 0–0.8)
EOSINOPHIL NFR BLD: 0 % (ref 0.5–7.8)
ERYTHROCYTE [DISTWIDTH] IN BLOOD BY AUTOMATED COUNT: 13.2 % (ref 11.9–14.6)
GAS FLOW.O2 O2 DELIVERY SYS: ABNORMAL L/MIN
GLOBULIN SER CALC-MCNC: 3.3 G/DL (ref 2.8–4.5)
GLUCOSE BLD STRIP.AUTO-MCNC: 221 MG/DL (ref 65–100)
GLUCOSE BLD STRIP.AUTO-MCNC: 265 MG/DL (ref 65–100)
GLUCOSE BLD STRIP.AUTO-MCNC: 315 MG/DL (ref 65–100)
GLUCOSE BLD STRIP.AUTO-MCNC: 346 MG/DL (ref 65–100)
GLUCOSE BLD STRIP.AUTO-MCNC: 350 MG/DL (ref 65–100)
GLUCOSE BLD STRIP.AUTO-MCNC: 444 MG/DL (ref 65–100)
GLUCOSE SERPL-MCNC: 442 MG/DL (ref 65–100)
HCO3 BLD-SCNC: 29.9 MMOL/L (ref 22–26)
HCT VFR BLD AUTO: 42.2 % (ref 41.1–50.3)
HGB BLD-MCNC: 13.3 G/DL (ref 13.6–17.2)
IMM GRANULOCYTES # BLD AUTO: 0 K/UL (ref 0–0.5)
IMM GRANULOCYTES NFR BLD AUTO: 1 % (ref 0–5)
LYMPHOCYTES # BLD: 0.7 K/UL (ref 0.5–4.6)
LYMPHOCYTES NFR BLD: 11 % (ref 13–44)
MCH RBC QN AUTO: 27.9 PG (ref 26.1–32.9)
MCHC RBC AUTO-ENTMCNC: 31.5 G/DL (ref 31.4–35)
MCV RBC AUTO: 88.7 FL (ref 82–102)
MONOCYTES # BLD: 0.3 K/UL (ref 0.1–1.3)
MONOCYTES NFR BLD: 5 % (ref 4–12)
NEUTS SEG # BLD: 5 K/UL (ref 1.7–8.2)
NEUTS SEG NFR BLD: 83 % (ref 43–78)
NRBC # BLD: 0 K/UL (ref 0–0.2)
NT PRO BNP: 414 PG/ML (ref 5–125)
O2/TOTAL GAS SETTING VFR VENT: 40 %
PCO2 BLD: 41.2 MMHG (ref 35–45)
PH BLD: 7.47 [PH] (ref 7.35–7.45)
PLATELET # BLD AUTO: 201 K/UL (ref 150–450)
PMV BLD AUTO: 10.9 FL (ref 9.4–12.3)
PO2 BLD: 57 MMHG (ref 75–100)
POTASSIUM SERPL-SCNC: 4.8 MMOL/L (ref 3.5–5.1)
PROT SERPL-MCNC: 6.6 G/DL (ref 6.3–8.2)
RBC # BLD AUTO: 4.76 M/UL (ref 4.23–5.6)
SAO2 % BLD: 91 % (ref 95–98)
SERVICE CMNT-IMP: ABNORMAL
SODIUM SERPL-SCNC: 134 MMOL/L (ref 133–143)
SPECIMEN TYPE: ABNORMAL
TROPONIN I SERPL HS-MCNC: 10.7 PG/ML (ref 0–14)
TROPONIN I SERPL HS-MCNC: 11.3 PG/ML (ref 0–14)
TROPONIN I SERPL HS-MCNC: 11.8 PG/ML (ref 0–14)
WBC # BLD AUTO: 6 K/UL (ref 4.3–11.1)

## 2022-10-24 PROCEDURE — 83880 ASSAY OF NATRIURETIC PEPTIDE: CPT

## 2022-10-24 PROCEDURE — 94760 N-INVAS EAR/PLS OXIMETRY 1: CPT

## 2022-10-24 PROCEDURE — 82803 BLOOD GASES ANY COMBINATION: CPT

## 2022-10-24 PROCEDURE — 93005 ELECTROCARDIOGRAM TRACING: CPT | Performed by: FAMILY MEDICINE

## 2022-10-24 PROCEDURE — 1100000000 HC RM PRIVATE

## 2022-10-24 PROCEDURE — 71046 X-RAY EXAM CHEST 2 VIEWS: CPT

## 2022-10-24 PROCEDURE — 2580000003 HC RX 258: Performed by: FAMILY MEDICINE

## 2022-10-24 PROCEDURE — 6360000002 HC RX W HCPCS: Performed by: FAMILY MEDICINE

## 2022-10-24 PROCEDURE — 36600 WITHDRAWAL OF ARTERIAL BLOOD: CPT

## 2022-10-24 PROCEDURE — 94761 N-INVAS EAR/PLS OXIMETRY MLT: CPT

## 2022-10-24 PROCEDURE — 82962 GLUCOSE BLOOD TEST: CPT

## 2022-10-24 PROCEDURE — 85025 COMPLETE CBC W/AUTO DIFF WBC: CPT

## 2022-10-24 PROCEDURE — 2700000000 HC OXYGEN THERAPY PER DAY

## 2022-10-24 PROCEDURE — 94640 AIRWAY INHALATION TREATMENT: CPT

## 2022-10-24 PROCEDURE — 36415 COLL VENOUS BLD VENIPUNCTURE: CPT

## 2022-10-24 PROCEDURE — 84484 ASSAY OF TROPONIN QUANT: CPT

## 2022-10-24 PROCEDURE — 80053 COMPREHEN METABOLIC PANEL: CPT

## 2022-10-24 PROCEDURE — 2500000003 HC RX 250 WO HCPCS: Performed by: FAMILY MEDICINE

## 2022-10-24 PROCEDURE — 6370000000 HC RX 637 (ALT 250 FOR IP): Performed by: FAMILY MEDICINE

## 2022-10-24 RX ADMIN — MELOXICAM 15 MG: 7.5 TABLET ORAL at 08:09

## 2022-10-24 RX ADMIN — GABAPENTIN 400 MG: 400 CAPSULE ORAL at 08:09

## 2022-10-24 RX ADMIN — FAMOTIDINE 40 MG: 20 TABLET, FILM COATED ORAL at 08:10

## 2022-10-24 RX ADMIN — LISINOPRIL 20 MG: 20 TABLET ORAL at 08:09

## 2022-10-24 RX ADMIN — TAMSULOSIN HYDROCHLORIDE 0.4 MG: 0.4 CAPSULE ORAL at 08:10

## 2022-10-24 RX ADMIN — ALBUTEROL SULFATE 2.5 MG: 2.5 SOLUTION RESPIRATORY (INHALATION) at 20:28

## 2022-10-24 RX ADMIN — GABAPENTIN 400 MG: 400 CAPSULE ORAL at 12:10

## 2022-10-24 RX ADMIN — ENOXAPARIN SODIUM 30 MG: 100 INJECTION SUBCUTANEOUS at 22:22

## 2022-10-24 RX ADMIN — PANTOPRAZOLE SODIUM 40 MG: 40 TABLET, DELAYED RELEASE ORAL at 08:09

## 2022-10-24 RX ADMIN — OXYCODONE HYDROCHLORIDE 30 MG: 15 TABLET ORAL at 08:10

## 2022-10-24 RX ADMIN — ALBUTEROL SULFATE 2.5 MG: 2.5 SOLUTION RESPIRATORY (INHALATION) at 11:41

## 2022-10-24 RX ADMIN — SODIUM CHLORIDE, PRESERVATIVE FREE 10 ML: 5 INJECTION INTRAVENOUS at 08:15

## 2022-10-24 RX ADMIN — HYDROCHLOROTHIAZIDE 12.5 MG: 25 TABLET ORAL at 08:10

## 2022-10-24 RX ADMIN — INSULIN GLARGINE 56 UNITS: 100 INJECTION, SOLUTION SUBCUTANEOUS at 08:07

## 2022-10-24 RX ADMIN — INSULIN LISPRO 6 UNITS: 100 INJECTION, SOLUTION INTRAVENOUS; SUBCUTANEOUS at 12:09

## 2022-10-24 RX ADMIN — INSULIN LISPRO 25 UNITS: 100 INJECTION, SOLUTION INTRAVENOUS; SUBCUTANEOUS at 17:07

## 2022-10-24 RX ADMIN — WATER 10 MG: 1 INJECTION INTRAMUSCULAR; INTRAVENOUS; SUBCUTANEOUS at 23:53

## 2022-10-24 RX ADMIN — ALBUTEROL SULFATE 2.5 MG: 2.5 SOLUTION RESPIRATORY (INHALATION) at 08:13

## 2022-10-24 RX ADMIN — ZOLPIDEM TARTRATE 10 MG: 5 TABLET ORAL at 00:18

## 2022-10-24 RX ADMIN — ALBUTEROL SULFATE 2.5 MG: 2.5 SOLUTION RESPIRATORY (INHALATION) at 15:23

## 2022-10-24 RX ADMIN — ATORVASTATIN CALCIUM 20 MG: 10 TABLET, FILM COATED ORAL at 22:22

## 2022-10-24 RX ADMIN — ZOLPIDEM TARTRATE 10 MG: 5 TABLET ORAL at 22:21

## 2022-10-24 RX ADMIN — GABAPENTIN 400 MG: 400 CAPSULE ORAL at 22:22

## 2022-10-24 RX ADMIN — SODIUM CHLORIDE, PRESERVATIVE FREE 10 ML: 5 INJECTION INTRAVENOUS at 22:23

## 2022-10-24 RX ADMIN — OXYCODONE HYDROCHLORIDE 30 MG: 15 TABLET ORAL at 17:37

## 2022-10-24 RX ADMIN — AMLODIPINE BESYLATE 5 MG: 5 TABLET ORAL at 08:10

## 2022-10-24 RX ADMIN — PANTOPRAZOLE SODIUM 40 MG: 40 TABLET, DELAYED RELEASE ORAL at 22:22

## 2022-10-24 RX ADMIN — INSULIN LISPRO 8 UNITS: 100 INJECTION, SOLUTION INTRAVENOUS; SUBCUTANEOUS at 17:08

## 2022-10-24 RX ADMIN — ENOXAPARIN SODIUM 30 MG: 100 INJECTION SUBCUTANEOUS at 08:09

## 2022-10-24 RX ADMIN — INSULIN GLARGINE 56 UNITS: 100 INJECTION, SOLUTION SUBCUTANEOUS at 22:16

## 2022-10-24 RX ADMIN — INSULIN LISPRO 25 UNITS: 100 INJECTION, SOLUTION INTRAVENOUS; SUBCUTANEOUS at 08:08

## 2022-10-24 RX ADMIN — INSULIN LISPRO 8 UNITS: 100 INJECTION, SOLUTION INTRAVENOUS; SUBCUTANEOUS at 08:07

## 2022-10-24 RX ADMIN — GABAPENTIN 400 MG: 400 CAPSULE ORAL at 17:37

## 2022-10-24 ASSESSMENT — PAIN DESCRIPTION - LOCATION: LOCATION: BACK

## 2022-10-24 ASSESSMENT — ENCOUNTER SYMPTOMS
WHEEZING: 1
SORE THROAT: 0
SHORTNESS OF BREATH: 1
EYE DISCHARGE: 0
RHINORRHEA: 1
SINUS CONGESTION: 1

## 2022-10-24 ASSESSMENT — PAIN SCALES - GENERAL: PAINLEVEL_OUTOF10: 3

## 2022-10-24 ASSESSMENT — PAIN DESCRIPTION - DESCRIPTORS: DESCRIPTORS: ACHING

## 2022-10-24 ASSESSMENT — PAIN DESCRIPTION - ORIENTATION: ORIENTATION: LOWER

## 2022-10-24 ASSESSMENT — PAIN - FUNCTIONAL ASSESSMENT: PAIN_FUNCTIONAL_ASSESSMENT: ACTIVITIES ARE NOT PREVENTED

## 2022-10-24 NOTE — CARE COORDINATION
Discharge planning was discussed with the Interdisciplinary Team. The patient is requiring oxygen and is not yet medically ready for discharge.

## 2022-10-24 NOTE — ASSESSMENT & PLAN NOTE
- Continue home albuterol  - Not in overt exacerbation, however likely worsening respiratory symptoms of flu  - CXR is clear

## 2022-10-24 NOTE — PROGRESS NOTES
Patient sister to bring home CPAP 10/24. Patient on 5L nasal cannula. No distress noted at this time.

## 2022-10-24 NOTE — PROGRESS NOTES
Hospitalist Progress Note   Admit Date:  10/23/2022  8:45 PM   Name:  Trudy Singh   Age:  74 y.o. Sex:  male  :  1955   MRN:  [id number]   Room:  [facility]/    Presenting Complaint: No chief complaint on file. Reason(s) for Admission: Flu [J11.1]     Hospital Course:   74 y.o. male with medical history of HTN, DM2, COPD, MISTI who presented to the [facility] ED with SOB, cough, myalgias. Patient reports symptoms started about 3 days ago. Associated congestion and wheezing. Reports that significant other also same symptoms. Upon ER evaluation, CXR is clear. Labs are okay. Flu A is POSITIVE. Patient noted to be hypoxic to 86% on RA, so he was placed on 2lpm supplemental O2. Hospitalist to admit. Transfer to [facility] for further care. Subjective & 24hr Events (10/24/22): Continues to require oxygen. Having chest pain. No prior history of heart failure. Chest x-ray unremarkable. Troponin normal.  Mild elevation in BNP.       Assessment & Plan:   Chest pain  Assessment & Plan  -CXR  -Troponin  -BNP  -ECG    * Flu  Assessment & Plan  Flu A positive, Upon arrival to [facility], has now been placed on 5lpm as he was hypoxic on the 2lpm  - Does have h/o COPD and MISTI  - As symptoms started 3 days ago, no indication to start Tamiflu  - Continue breathing treatments  - Wean O2 as tolerated    10/24/2022: continue to wean as tolerated, on 4L    Type 2 diabetes mellitus with hyperglycemia  Assessment & Plan  -Basal 56U  -Prandial 25U  -Sliding scale insulin    MISTI treated with BiPAP  Assessment & Plan  - Continue home BiPAP/CPAP    Chronic low back pain  Assessment & Plan  - Continue home meds    Class 3 severe obesity due to excess calories with serious comorbidity and body mass index (BMI) of 45.0 to 49.9 in adult [facility])  Assessment & Plan  Increased risk of all cause mortality, complicating care  - healthy lifestyle at discharge    Type 2 diabetes mellitus with hyperglycemia, with long-term current use of insulin ([facility])  Assessment & Plan  - Continue home insulins  - Holding PO meds  - SSI coverage ordered  - Diabetic diet    COPD (chronic obstructive pulmonary disease) ([facility])  Assessment & Plan  - Continue home albuterol  - Not in overt exacerbation, however likely worsening respiratory symptoms of flu  - CXR is clear    High cholesterol  Assessment & Plan  - Continue home statin    Spinal stenosis, lumbar region, with neurogenic claudication  Assessment & Plan  - Of note  - Home meds    Essential hypertension, benign  Assessment & Plan  - Home meds          Anticipated discharge needs:    Home with home oxygen    Diet:  ADULT DIET; Regular; 4 carb choices (60 gm/meal); Low Fat/Low Chol/High Fiber/2 gm Na  DVT PPx: Enoxaparin  Code status: Full Code    Hospital Problems:  Principal Problem:    Flu  Active Problems:    Chest pain    Type 2 diabetes mellitus with hyperglycemia    Essential hypertension, benign    Spinal stenosis, lumbar region, with neurogenic claudication    High cholesterol    COPD (chronic obstructive pulmonary disease) ([facility])    Type 2 diabetes mellitus with hyperglycemia, with long-term current use of insulin ([facility])    Class 3 severe obesity due to excess calories with serious comorbidity and body mass index (BMI) of 45.0 to 49.9 in adult [facility])    Chronic low back pain    MISTI treated with BiPAP  Resolved Problems:    * No resolved hospital problems.  *      Objective:   Patient Vitals for the past 24 hrs:   Temp Pulse Resp BP SpO2   10/24/22 1653 97.9 °F (36.6 °C) (!) 106 20 129/72 92 %   10/24/22 1523 -- 96 16 -- 96 %   10/24/22 1142 97.3 °F (36.3 °C) 89 20 124/73 91 %   10/24/22 1141 -- 91 16 -- 91 %   10/24/22 0814 -- 89 16 -- 92 %   10/24/22 0732 97.5 °F (36.4 °C) 88 18 139/79 92 %   10/24/22 0253 98.2 °F (36.8 °C) 89 18 119/75 99 %   10/23/22 2311 -- -- -- -- 92 %   10/23/22 2045 99 °F (37.2 °C) 98 22 (!) 119/45 92 %       Oxygen Therapy  SpO2: 92 %  Pulse Oximeter Device Mode: Intermittent  Pulse Oximeter Device Location: Finger  O2 Device: Nasal cannula  O2 Flow Rate (L/min): 4 L/min    Estimated body mass index is 45.78 kg/m² as calculated from the following:    Height as of an earlier encounter on 10/23/22: 5' 9\" (1.753 m). Weight as of an earlier encounter on 10/23/22: 310 lb (140.6 kg). Intake/Output Summary (Last 24 hours) at 10/24/2022 1809  Last data filed at 10/24/2022 0834  Gross per 24 hour   Intake 400 ml   Output 1250 ml   Net -850 ml       Blood pressure 129/72, pulse (!) 106, temperature 97.9 °F (36.6 °C), temperature source Oral, resp. rate 20, SpO2 92 %. Physical Exam  Vitals and nursing note reviewed. Constitutional:       General: He is not in acute distress. Appearance: He is morbidly obese. He is ill-appearing. He is not diaphoretic. HENT:      Head: Normocephalic and atraumatic. Eyes:      Extraocular Movements: Extraocular movements intact. Cardiovascular:      Rate and Rhythm: Normal rate and regular rhythm. Pulses: Normal pulses. Comments: Tripoding, holding fisted chest  Pulmonary:      Effort: Pulmonary effort is normal. No respiratory distress. Breath sounds: Wheezing and rhonchi present. Abdominal:      General: There is no distension. Musculoskeletal:         General: No deformity. Right lower leg: No edema. Left lower leg: No edema. Skin:     Coloration: Skin is not jaundiced or pale. Neurological:      General: No focal deficit present. Mental Status: He is alert and oriented to person, place, and time. Psychiatric:         Mood and Affect: Mood normal.         Behavior: Behavior normal. Behavior is cooperative.          I have personally reviewed labs and tests showing:  Recent Labs:  Recent Results (from the past 48 hour(s))   COVID-19, Rapid    Collection Time: 10/23/22  4:34 PM    Specimen: Nasopharyngeal   Result Value Ref Range    Source Nasopharyngeal      SARS-CoV-2, Rapid Not detected NOTD     Rapid influenza A/B antigens    Collection Time: 10/23/22  4:34 PM    Specimen: Nasal Washing   Result Value Ref Range    Influenza A Ag Positive (A) NEG      Influenza B Ag Negative NEG      Source Nasopharyngeal     CBC with Auto Differential    Collection Time: 10/23/22  5:07 PM   Result Value Ref Range    WBC 5.8 4.3 - 11.1 K/uL    RBC 4.85 4.23 - 5.60 M/uL    Hemoglobin 13.8 13.6 - 17.2 g/dL    Hematocrit 42.9 41.1 - 50.3 %    MCV 88.5 82.0 - 102.0 FL    MCH 28.5 26.1 - 32.9 PG    MCHC 32.2 31.4 - 35.0 g/dL    RDW 13.3 11.9 - 14.6 %    Platelets 503 408 - 710 K/uL    MPV 10.9 9.4 - 12.3 FL    nRBC 0.00 0.0 - 0.2 K/uL    Differential Type AUTOMATED      Seg Neutrophils 69 43 - 78 %    Lymphocytes 19 13 - 44 %    Monocytes 12 4.0 - 12.0 %    Eosinophils % 0 (L) 0.5 - 7.8 %    Basophils 0 0.0 - 2.0 %    Immature Granulocytes 0 0.0 - 5.0 %    Segs Absolute 4.0 1.7 - 8.2 K/UL    Absolute Lymph # 1.1 0.5 - 4.6 K/UL    Absolute Mono # 0.7 0.1 - 1.3 K/UL    Absolute Eos # 0.0 0.0 - 0.8 K/UL    Basophils Absolute 0.0 0.0 - 0.2 K/UL    Absolute Immature Granulocyte 0.0 0.0 - 0.5 K/UL   Comprehensive Metabolic Panel    Collection Time: 10/23/22  5:07 PM   Result Value Ref Range    Sodium 139 133 - 143 mmol/L    Potassium 4.5 3.5 - 5.1 mmol/L    Chloride 96 (L) 98 - 107 mmol/L    CO2 33 (H) 21 - 32 mmol/L    Anion Gap 10 2 - 11 mmol/L    Glucose 170 (H) 65 - 100 mg/dL    BUN 19 (H) 7.0 - 18.0 MG/DL    Creatinine 0.84 0.8 - 1.5 MG/DL    Est, Glom Filt Rate >60 >60 ml/min/1.73m2    Calcium 8.7 8.3 - 10.4 MG/DL    Total Bilirubin 0.3 0.2 - 1.1 MG/DL    ALT 31 13.0 - 61.0 U/L    AST 49 (H) 15 - 37 U/L    Alk Phosphatase 71 45.0 - 117.0 U/L    Total Protein 7.2 6.4 - 8.2 g/dL    Albumin 4.2 3.2 - 4.6 g/dL    Globulin 3.0 2.8 - 4.5 g/dL    Albumin/Globulin Ratio 1.4 0.4 - 1.6     Magnesium    Collection Time: 10/23/22  5:07 PM   Result Value Ref Range    Magnesium 1.8 1.2 - 2.6 mg/dL   Culture, Blood 1    Collection Time: 10/23/22  5:07 PM    Specimen: Blood   Result Value Ref Range    Special Requests NO SPECIAL REQUESTS  RIGHT  Antecubital        Culture NO GROWTH AFTER 12 HOURS     Culture, Blood 1    Collection Time: 10/23/22  5:07 PM    Specimen: Blood   Result Value Ref Range    Special Requests NO SPECIAL REQUESTS  LEFT  FOREARM        Culture NO GROWTH AFTER 12 HOURS     POCT Glucose    Collection Time: 10/23/22  9:56 PM   Result Value Ref Range    POC Glucose 320 (H) 65 - 100 mg/dL    Performed by:  [facility]    Comprehensive Metabolic Panel w/ Reflex to MG    Collection Time: 10/24/22  4:29 AM   Result Value Ref Range    Sodium 134 133 - 143 mmol/L    Potassium 4.8 3.5 - 5.1 mmol/L    Chloride 94 (L) 101 - 110 mmol/L    CO2 33 (H) 21 - 32 mmol/L    Anion Gap 7 2 - 11 mmol/L    Glucose 442 (H) 65 - 100 mg/dL    BUN 21 8 - 23 MG/DL    Creatinine 1.08 0.8 - 1.5 MG/DL    Est, Glom Filt Rate >60 >60 ml/min/1.73m2    Calcium 8.4 8.3 - 10.4 MG/DL    Total Bilirubin 0.4 0.2 - 1.1 MG/DL    ALT 42 12 - 65 U/L    AST 30 15 - 37 U/L    Alk Phosphatase 66 50 - 136 U/L    Total Protein 6.6 6.3 - 8.2 g/dL    Albumin 3.3 3.2 - 4.6 g/dL    Globulin 3.3 2.8 - 4.5 g/dL    Albumin/Globulin Ratio 1.0 0.4 - 1.6     CBC with Auto Differential    Collection Time: 10/24/22  4:29 AM   Result Value Ref Range    WBC 6.0 4.3 - 11.1 K/uL    RBC 4.76 4.23 - 5.6 M/uL    Hemoglobin 13.3 (L) 13.6 - 17.2 g/dL    Hematocrit 42.2 41.1 - 50.3 %    MCV 88.7 82.0 - 102.0 FL    MCH 27.9 26.1 - 32.9 PG    MCHC 31.5 31.4 - 35.0 g/dL    RDW 13.2 11.9 - 14.6 %    Platelets 516 255 - 263 K/uL    MPV 10.9 9.4 - 12.3 FL    nRBC 0.00 0.0 - 0.2 K/uL    Differential Type AUTOMATED      Seg Neutrophils 83 (H) 43 - 78 %    Lymphocytes 11 (L) 13 - 44 %    Monocytes 5 4.0 - 12.0 %    Eosinophils % 0 (L) 0.5 - 7.8 %    Basophils 0 0.0 - 2.0 %    Immature Granulocytes 1 0.0 - 5.0 %    Segs Absolute 5.0 1.7 - 8.2 K/UL    Absolute Lymph # 0.7 0.5 - 4.6 K/UL    Absolute Mono # 0.3 0.1 - 1.3 K/UL    Absolute Eos # 0.0 0.0 - 0.8 K/UL    Basophils Absolute 0.0 0.0 - 0.2 K/UL    Absolute Immature Granulocyte 0.0 0.0 - 0.5 K/UL   POCT Glucose    Collection Time: 10/24/22  7:35 AM   Result Value Ref Range    POC Glucose 444 (H) 65 - 100 mg/dL    Performed by: [facility]    POCT Glucose    Collection Time: 10/24/22  9:16 AM   Result Value Ref Range    POC Glucose 346 (H) 65 - 100 mg/dL    Performed by: [facility]    POCT Glucose    Collection Time: 10/24/22 11:45 AM   Result Value Ref Range    POC Glucose 315 (H) 65 - 100 mg/dL    Performed by: Manuel Cornelius    EKG 12 Lead    Collection Time: 10/24/22 11:49 AM   Result Value Ref Range    Ventricular Rate 93 BPM    Atrial Rate 93 BPM    P-R Interval 182 ms    QRS Duration 84 ms    Q-T Interval 352 ms    QTc Calculation (Bazett) 437 ms    P Axis 32 degrees    R Axis 61 degrees    T Axis 52 degrees    Diagnosis Normal sinus rhythm    Troponin    Collection Time: 10/24/22 12:54 PM   Result Value Ref Range    Troponin, High Sensitivity 10.7 0 - 14 pg/mL   Brain Natriuretic Peptide    Collection Time: 10/24/22 12:54 PM   Result Value Ref Range    NT Pro- (H) 5 - 125 PG/ML   Troponin    Collection Time: 10/24/22  3:15 PM   Result Value Ref Range    Troponin, High Sensitivity 11.8 0 - 14 pg/mL   POCT Glucose    Collection Time: 10/24/22  4:51 PM   Result Value Ref Range    POC Glucose 350 (H) 65 - 100 mg/dL    Performed by: Manuel Cornelius    Troponin    Collection Time: 10/24/22  5:21 PM   Result Value Ref Range    Troponin, High Sensitivity 11.3 0 - 14 pg/mL       I have personally reviewed imaging studies showing: Other Studies:  XR CHEST (2 VW)   Final Result   No acute cardiopulmonary abnormality.           Current Meds:  Current Facility-Administered Medications   Medication Dose Route Frequency    insulin lispro (HUMALOG) injection vial 0-8 Units  0-8 Units SubCUTAneous TID WC    insulin lispro (HUMALOG) injection vial 0-4 Units  0-4 Units SubCUTAneous Nightly    amLODIPine (NORVASC) tablet 5 mg  5 mg Oral Daily    famotidine (PEPCID) tablet 40 mg  40 mg Oral Daily    insulin glargine (LANTUS) injection vial 56 Units  56 Units SubCUTAneous BID    lisinopril (PRINIVIL;ZESTRIL) tablet 20 mg  20 mg Oral Daily    oxyCODONE (OXY-IR) immediate release tablet 30 mg  30 mg Oral Q6H PRN    pantoprazole (PROTONIX) tablet 40 mg  40 mg Oral BID    tamsulosin (FLOMAX) capsule 0.4 mg  0.4 mg Oral Daily    zolpidem (AMBIEN) tablet 10 mg  10 mg Oral Nightly PRN    hydroCHLOROthiazide (HYDRODIURIL) tablet 12.5 mg  12.5 mg Oral Daily    meloxicam (MOBIC) tablet 15 mg  15 mg Oral Daily    gabapentin (NEURONTIN) capsule 400 mg  400 mg Oral 4x Daily    atorvastatin (LIPITOR) tablet 20 mg  20 mg Oral Nightly    insulin lispro (HUMALOG) injection vial 25 Units  25 Units SubCUTAneous BID WC    sodium chloride flush 0.9 % injection 5-40 mL  5-40 mL IntraVENous 2 times per day    sodium chloride flush 0.9 % injection 5-40 mL  5-40 mL IntraVENous PRN    0.9 % sodium chloride infusion   IntraVENous PRN    enoxaparin Sodium (LOVENOX) injection 30 mg  30 mg SubCUTAneous BID    ondansetron (ZOFRAN-ODT) disintegrating tablet 4 mg  4 mg Oral Q8H PRN    Or    ondansetron (ZOFRAN) injection 4 mg  4 mg IntraVENous Q6H PRN    polyethylene glycol (GLYCOLAX) packet 17 g  17 g Oral Daily PRN    acetaminophen (TYLENOL) tablet 650 mg  650 mg Oral Q6H PRN    Or    acetaminophen (TYLENOL) suppository 650 mg  650 mg Rectal Q6H PRN    glucose chewable tablet 16 g  4 tablet Oral PRN    dextrose bolus 10% 125 mL  125 mL IntraVENous PRN    Or    dextrose bolus 10% 250 mL  250 mL IntraVENous PRN    glucagon (rDNA) injection 1 mg  1 mg SubCUTAneous PRN    dextrose 10 % infusion   IntraVENous Continuous PRN    albuterol (PROVENTIL) nebulizer solution 2.5 mg  2.5 mg Nebulization 4x daily    guaiFENesin (ROBITUSSIN) 100 MG/5ML oral solution 200 mg  200 mg Oral Q4H PRN Signed:  Dalila Jones MD

## 2022-10-24 NOTE — CARE COORDINATION
10/24/22 1402   Service Assessment   Patient Orientation Alert and Oriented   Cognition Alert   History Provided By Patient   Primary Caregiver Self   Support Systems Children;Friends/Neighbors   PCP Verified by CM Yes   Prior Functional Level Independent in ADLs/IADLs   Current Functional Level Independent in ADLs/IADLs   Can patient return to prior living arrangement Yes   Ability to make needs known: Good   Family able to assist with home care needs: Yes   Would you like for me to discuss the discharge plan with any other family members/significant others, and if so, who? Yes  Billie Mas, daughter)   Financial Resources Medicare   Community Resources Other (Comment)  (none)   Social/Functional History   Lives With Friend(s)   Type of Home House   Home Equipment Oxygen  (nebulizer, CPAP)   ADL Assistance Independent   Discharge Planning   Type of Residence House   Current Services Prior To Admission C-pap;Oxygen Therapy   DME Ordered? No   Potential Assistance Purchasing Medications No     RN CM met with the patient at the bedside and discussed discharge planning. He lives with a friend in a private residence and plans on returning home at discharge. He is currently requiring oxygen above his baseline. He stated he wears 4.5lmp O2 at night. RN CM encouraged him to ask questions. He verbalized understanding and agreement with the discharge plan.

## 2022-10-24 NOTE — H&P
Hospitalist History and Physical   Admit Date:  10/23/2022  8:45 PM   Name:  Jamaica Lopez   Age:  74 y.o. Sex:  male  :  1955   MRN:  [id number]     Presenting Complaint: SOB  Reason(s) for Admission: Flu [J11.1]     History of Present Illness:   Jamaica Lopez is a 74 y.o. male with medical history of HTN, DM2, COPD, MISTI who presented to the [facility] ED with SOB, cough, myalgias. Patient reports symptoms started about 3 days ago. Associated congestion and wheezing. Reports that significant other also same symptoms. Upon ER evaluation, CXR is clear. Labs are okay. Flu A is POSITIVE. Patient noted to be hypoxic to 86% on RA, so he was placed on 2lpm supplemental O2. Hospitalist to admit. Transfer to [facility] for further care. Review of Systems:  10 systems reviewed and negative except as noted in HPI.   Assessment & Plan:   Circulatory  Essential hypertension, benign  Assessment & Plan  - Home meds    Endocrine  Type 2 diabetes mellitus ([facility]  Assessment & Plan  - Continue home insulins  - Holding PO meds  - SSI coverage ordered  - Diabetic diet    Respiratory  MISTI treated with BiPAP  Assessment & Plan  - Continue home BiPAP/CPAP    COPD (chronic obstructive pulmonary disease) (HCC)  Assessment & Plan  - Continue home albuterol  - Not in overt exacerbation, however likely worsening respiratory symptoms of flu  - CXR is clear    * Flu  Assessment & Plan  - Flu A positive  - Upon arrival to [facility], has now been placed on 5lpm as he was hypoxic on the 2lpm  - Does have h/o COPD and MISTI  - As symptoms started 3 days ago, no indication to start Tamiflu  - Continue breathing treatments  - Wean O2 as tolerated    Other  Chronic low back pain  Assessment & Plan  - Continue home meds    Obesity, morbid ([facility]  Assessment & Plan  - Of note    High cholesterol  Assessment & Plan  - Continue home statin    Spinal stenosis, lumbar region, with neurogenic claudication  Assessment & Plan  - Of note  - Home meds       Disposition: inpatient    Past medical history reviewed. Past Medical History:   Diagnosis Date    Acquired cyst of kidney     Arthritis     generalized     Balanoposthitis     BPH (benign prostatic hypertrophy)     Chronic pain     back     Claustrophobia     Depression     Dysuria     GERD (gastroesophageal reflux disease)     managed with medication     High cholesterol     Hypertension     managed with medication     Impotence of organic origin     Obesity (BMI 30-39. 9)     Obesity (BMI 30-39. 9)     BMI 40.1    Other testicular hypofunction     Tinnitus of both ears     Type 2 diabetes mellitus (HCC)     type 2, adverage glucose 150-200, symptomatic is unknown     Unspecified adverse effect of anesthesia     woke up in middle of procedure x 2    Unspecified sleep apnea     CPAP compliant    Wears dentures     uppers     Past surgical history reviewed. Past Surgical History:   Procedure Laterality Date    ANKLE FRACTURE SURGERY  5 yrs ago    right with pinning    CARPAL TUNNEL RELEASE  over 10 yrs ago    bilateral    CIRCUMCISION      ORTHOPEDIC SURGERY      L4 and 5 compression with dustin in place      Allergies   Allergen Reactions    Adhesive Tape Hives     bandaids        Social History     Tobacco Use    Smoking status: Former     Packs/day: 3.00     Types: Cigarettes     Quit date: 10/5/1988     Years since quittin.0    Smokeless tobacco: Never    Tobacco comments:     Quit smoking: quit    Substance Use Topics    Alcohol use: Yes      Family History   Problem Relation Age of Onset    Diabetes Sister     Diabetes Brother     Diabetes Sister     Diabetes Mother     Stroke Father     Hypertension Mother     Diabetes Father     Hypertension Father     Stroke Mother       Family history reviewed and noncontributory to patient's acute condition; no relevant family history unless otherwise noted above.   Immunization History   Administered Date(s) Administered    COVID-19, PFIZER PURPLE top, DILUTE for use, (age 15 y+), 30mcg/0.3mL 04/16/2021, 05/10/2021    Influenza, FLUARIX, FLULAVAL, FLUZONE (age 10 mo+) AND AFLURIA, (age 1 y+), PF, 0.5mL 12/12/2020     PTA Medications:  Current Outpatient Medications   Medication Instructions    albuterol (PROVENTIL) 2.5 mg, Inhalation, 4 TIMES DAILY    amLODIPine (NORVASC) 5 mg, Oral    atorvastatin (LIPITOR) 20 MG tablet No dose, route, or frequency recorded. azelastine (ASTELIN) 0.1 % nasal spray 2 sprays, Nasal, 2 TIMES DAILY    azelastine (ASTELIN) 0.1 % nasal spray 1 spray, Nasal, 2 TIMES DAILY, Use in each nostril as directed    famotidine (PEPCID) 40 mg, Oral, DAILY    gabapentin (NEURONTIN) 400 MG capsule TAKE 1 CAPSULE BY MOUTH 4 TIMES A DAY AS DIRECTED    gabapentin (NEURONTIN) 800 mg, Oral, 4 TIMES DAILY    glipiZIDE (GLUCOTROL) 10 mg, Oral, DAILY    hydroCHLOROthiazide (HYDRODIURIL) 12.5 mg, Oral, DAILY    insulin lispro (HUMALOG KWIKPEN) 200 UNIT/ML SOPN pen 25 units breakfast 35 units supper plus correction up to 80 units per day    JARDIANCE 10 MG tablet No dose, route, or frequency recorded. lisinopril (PRINIVIL;ZESTRIL) 20 mg, Oral, DAILY    meloxicam (MOBIC) 15 MG tablet TAKE 1 TABLET BY MOUTH EVERY DAY WITH FOOD    metFORMIN (GLUCOPHAGE-XR) 1,500 mg, Oral    naloxegol (MOVANTIK) 25 MG TABS tablet Oral, DAILY BEFORE BREAKFAST    naloxone 4 MG/0.1ML LIQD nasal spray No dose, route, or frequency recorded.     oxyCODONE (OXY-IR) 30 mg, Oral, EVERY 6 HOURS PRN    OXYGEN 2 lpm qhs and 2 lpm with exertion prn    pantoprazole (PROTONIX) 40 mg, Oral, 2 TIMES DAILY    Sennosides 8.6 MG CAPS Oral    tamsulosin (FLOMAX) 0.4 MG capsule TAKE 1 CAPSULE BY MOUTH DAILY    Tresiba FlexTouch 140 Units, SubCUTAneous    Vitamin E 400 units TABS 1 tablet, Oral    zolpidem (AMBIEN) 10 mg, Oral       Objective:   Patient Vitals for the past 24 hrs:   Temp Pulse Resp BP SpO2   10/23/22 2045 99 °F (37.2 °C) 98 22 (!) 119/45 92 %          Estimated body mass index is 45.78 kg/m² as calculated from the following:    Height as of an earlier encounter on 10/23/22: 5' 9\" (1.753 m). Weight as of an earlier encounter on 10/23/22: 310 lb (140.6 kg). No intake or output data in the 24 hours ending 10/23/22 2257      Physical Exam:  General:    Well nourished. No overt distress  Head:  Normocephalic, atraumatic  Eyes:  Sclerae appear normal.  Pupils equally round. HENT:  Nares appear normal, no drainage. Moist mucous membranes  Neck:  No restricted ROM. Trachea midline  CV:   RRR. S1/S2 auscultated  Lungs:   Diminished  Abdomen: Bowel sounds present. Soft, nontender, nondistended. Extremities: Warm and dry. No cyanosis or clubbing. No edema. Skin:     No rashes. Normal turgor. Normal coloration  Neuro:  Cranial nerves II-XII grossly intact. Sensation intact  Psych:  Normal mood and affect.   Alert and oriented x3    Data Ordered and Personally Reviewed:    Last 24hr Labs:  Recent Results (from the past 24 hour(s))   COVID-19, Rapid    Collection Time: 10/23/22  4:34 PM    Specimen: Nasopharyngeal   Result Value Ref Range    Source Nasopharyngeal      SARS-CoV-2, Rapid Not detected NOTD     Rapid influenza A/B antigens    Collection Time: 10/23/22  4:34 PM    Specimen: Nasal Washing   Result Value Ref Range    Influenza A Ag Positive (A) NEG      Influenza B Ag Negative NEG      Source Nasopharyngeal     CBC with Auto Differential    Collection Time: 10/23/22  5:07 PM   Result Value Ref Range    WBC 5.8 4.3 - 11.1 K/uL    RBC 4.85 4.23 - 5.60 M/uL    Hemoglobin 13.8 13.6 - 17.2 g/dL    Hematocrit 42.9 41.1 - 50.3 %    MCV 88.5 82.0 - 102.0 FL    MCH 28.5 26.1 - 32.9 PG    MCHC 32.2 31.4 - 35.0 g/dL    RDW 13.3 11.9 - 14.6 %    Platelets 152 947 - 863 K/uL    MPV 10.9 9.4 - 12.3 FL    nRBC 0.00 0.0 - 0.2 K/uL    Differential Type AUTOMATED      Seg Neutrophils 69 43 - 78 %    Lymphocytes 19 13 - 44 %    Monocytes 12 4.0 - 12.0 %    Eosinophils % 0 (L) 0.5 - 7.8 %    Basophils 0 0.0 - 2.0 %    Immature Granulocytes 0 0.0 - 5.0 %    Segs Absolute 4.0 1.7 - 8.2 K/UL    Absolute Lymph # 1.1 0.5 - 4.6 K/UL    Absolute Mono # 0.7 0.1 - 1.3 K/UL    Absolute Eos # 0.0 0.0 - 0.8 K/UL    Basophils Absolute 0.0 0.0 - 0.2 K/UL    Absolute Immature Granulocyte 0.0 0.0 - 0.5 K/UL   Comprehensive Metabolic Panel    Collection Time: 10/23/22  5:07 PM   Result Value Ref Range    Sodium 139 133 - 143 mmol/L    Potassium 4.5 3.5 - 5.1 mmol/L    Chloride 96 (L) 98 - 107 mmol/L    CO2 33 (H) 21 - 32 mmol/L    Anion Gap 10 2 - 11 mmol/L    Glucose 170 (H) 65 - 100 mg/dL    BUN 19 (H) 7.0 - 18.0 MG/DL    Creatinine 0.84 0.8 - 1.5 MG/DL    Est, Glom Filt Rate >60 >60 ml/min/1.73m2    Calcium 8.7 8.3 - 10.4 MG/DL    Total Bilirubin 0.3 0.2 - 1.1 MG/DL    ALT 31 13.0 - 61.0 U/L    AST 49 (H) 15 - 37 U/L    Alk Phosphatase 71 45.0 - 117.0 U/L    Total Protein 7.2 6.4 - 8.2 g/dL    Albumin 4.2 3.2 - 4.6 g/dL    Globulin 3.0 2.8 - 4.5 g/dL    Albumin/Globulin Ratio 1.4 0.4 - 1.6     Magnesium    Collection Time: 10/23/22  5:07 PM   Result Value Ref Range    Magnesium 1.8 1.2 - 2.6 mg/dL   POCT Glucose    Collection Time: 10/23/22  9:56 PM   Result Value Ref Range    POC Glucose 320 (H) 65 - 100 mg/dL    Performed by:  Aster        Signed:  Ofe Silva MD

## 2022-10-24 NOTE — ASSESSMENT & PLAN NOTE
Flu A positive, Upon arrival to [facility], has now been placed on 5lpm as he was hypoxic on the 2lpm  - Does have h/o COPD and MISTI  - As symptoms started 3 days ago, no indication to start Tamiflu  - Continue breathing treatments  - Wean O2 as tolerated    10/25/2022: continue to wean as tolerated, briefly required BiPAP this morning for hypercapnia and confusion

## 2022-10-25 ENCOUNTER — APPOINTMENT (OUTPATIENT)
Dept: GENERAL RADIOLOGY | Age: 67
DRG: 193 | End: 2022-10-25
Attending: FAMILY MEDICINE
Payer: MEDICARE

## 2022-10-25 LAB
ALBUMIN SERPL-MCNC: 3.3 G/DL (ref 3.2–4.6)
ALBUMIN/GLOB SERPL: 0.9 {RATIO} (ref 0.4–1.6)
ALP SERPL-CCNC: 59 U/L (ref 50–136)
ALT SERPL-CCNC: 36 U/L (ref 12–65)
ANION GAP SERPL CALC-SCNC: 4 MMOL/L (ref 2–11)
ARTERIAL PATENCY WRIST A: POSITIVE
ARTERIAL PATENCY WRIST A: POSITIVE
AST SERPL-CCNC: 24 U/L (ref 15–37)
BASE EXCESS BLD CALC-SCNC: 5.1 MMOL/L
BASE EXCESS BLD CALC-SCNC: 7.8 MMOL/L
BASOPHILS # BLD: 0 K/UL (ref 0–0.2)
BASOPHILS NFR BLD: 0 % (ref 0–2)
BDY SITE: ABNORMAL
BDY SITE: ABNORMAL
BILIRUB SERPL-MCNC: 0.5 MG/DL (ref 0.2–1.1)
BUN SERPL-MCNC: 21 MG/DL (ref 8–23)
CALCIUM SERPL-MCNC: 8.5 MG/DL (ref 8.3–10.4)
CHLORIDE SERPL-SCNC: 93 MMOL/L (ref 101–110)
CO2 SERPL-SCNC: 35 MMOL/L (ref 21–32)
CREAT SERPL-MCNC: 1.12 MG/DL (ref 0.8–1.5)
DIFFERENTIAL METHOD BLD: ABNORMAL
EOSINOPHIL # BLD: 0 K/UL (ref 0–0.8)
EOSINOPHIL NFR BLD: 0 % (ref 0.5–7.8)
ERYTHROCYTE [DISTWIDTH] IN BLOOD BY AUTOMATED COUNT: 13.5 % (ref 11.9–14.6)
EST. AVERAGE GLUCOSE BLD GHB EST-MCNC: 214 MG/DL
GAS FLOW.O2 O2 DELIVERY SYS: ABNORMAL L/MIN
GAS FLOW.O2 O2 DELIVERY SYS: ABNORMAL L/MIN
GLOBULIN SER CALC-MCNC: 3.7 G/DL (ref 2.8–4.5)
GLUCOSE BLD STRIP.AUTO-MCNC: 248 MG/DL (ref 65–100)
GLUCOSE BLD STRIP.AUTO-MCNC: 255 MG/DL (ref 65–100)
GLUCOSE BLD STRIP.AUTO-MCNC: 286 MG/DL (ref 65–100)
GLUCOSE BLD STRIP.AUTO-MCNC: 334 MG/DL (ref 65–100)
GLUCOSE SERPL-MCNC: 271 MG/DL (ref 65–100)
HBA1C MFR BLD: 9.1 % (ref 4.8–5.6)
HCO3 BLD-SCNC: 34.2 MMOL/L (ref 22–26)
HCO3 BLD-SCNC: 34.6 MMOL/L (ref 22–26)
HCT VFR BLD AUTO: 42.7 % (ref 41.1–50.3)
HGB BLD-MCNC: 13.3 G/DL (ref 13.6–17.2)
IMM GRANULOCYTES # BLD AUTO: 0 K/UL (ref 0–0.5)
IMM GRANULOCYTES NFR BLD AUTO: 0 % (ref 0–5)
LYMPHOCYTES # BLD: 1.2 K/UL (ref 0.5–4.6)
LYMPHOCYTES NFR BLD: 13 % (ref 13–44)
MCH RBC QN AUTO: 27.8 PG (ref 26.1–32.9)
MCHC RBC AUTO-ENTMCNC: 31.1 G/DL (ref 31.4–35)
MCV RBC AUTO: 89.3 FL (ref 82–102)
MONOCYTES # BLD: 0.8 K/UL (ref 0.1–1.3)
MONOCYTES NFR BLD: 9 % (ref 4–12)
NEUTS SEG # BLD: 7.1 K/UL (ref 1.7–8.2)
NEUTS SEG NFR BLD: 78 % (ref 43–78)
NRBC # BLD: 0 K/UL (ref 0–0.2)
O2/TOTAL GAS SETTING VFR VENT: 100 %
PCO2 BLD: 54.1 MMHG (ref 35–45)
PCO2 BLD: 72.3 MMHG (ref 35–45)
PEEP RESPIRATORY: 6 CMH2O
PH BLD: 7.29 [PH] (ref 7.35–7.45)
PH BLD: 7.41 [PH] (ref 7.35–7.45)
PHOSPHATE SERPL-MCNC: 5.1 MG/DL (ref 2.3–3.7)
PLATELET # BLD AUTO: 186 K/UL (ref 150–450)
PMV BLD AUTO: 10.3 FL (ref 9.4–12.3)
PO2 BLD: 245 MMHG (ref 75–100)
PO2 BLD: 67 MMHG (ref 75–100)
POC FIO2: 7
POTASSIUM SERPL-SCNC: 4.5 MMOL/L (ref 3.5–5.1)
PRESSURE SUPPORT SETTING VENT: 10 CMH2O
PROCALCITONIN SERPL-MCNC: 0.1 NG/ML (ref 0–0.49)
PROT SERPL-MCNC: 7 G/DL (ref 6.3–8.2)
RBC # BLD AUTO: 4.78 M/UL (ref 4.23–5.6)
SAO2 % BLD: 92.6 % (ref 95–98)
SAO2 % BLD: 99.8 % (ref 95–98)
SERVICE CMNT-IMP: ABNORMAL
SODIUM SERPL-SCNC: 132 MMOL/L (ref 133–143)
SPECIMEN TYPE: ABNORMAL
SPECIMEN TYPE: ABNORMAL
TSH W FREE THYROID IF ABNORMAL: 0.68 UIU/ML (ref 0.36–3.74)
VT SETTING VENT: 420 ML
WBC # BLD AUTO: 9.1 K/UL (ref 4.3–11.1)

## 2022-10-25 PROCEDURE — 84145 PROCALCITONIN (PCT): CPT

## 2022-10-25 PROCEDURE — 85025 COMPLETE CBC W/AUTO DIFF WBC: CPT

## 2022-10-25 PROCEDURE — 94761 N-INVAS EAR/PLS OXIMETRY MLT: CPT

## 2022-10-25 PROCEDURE — 6360000002 HC RX W HCPCS: Performed by: FAMILY MEDICINE

## 2022-10-25 PROCEDURE — 71045 X-RAY EXAM CHEST 1 VIEW: CPT

## 2022-10-25 PROCEDURE — 36600 WITHDRAWAL OF ARTERIAL BLOOD: CPT

## 2022-10-25 PROCEDURE — 82962 GLUCOSE BLOOD TEST: CPT

## 2022-10-25 PROCEDURE — 6370000000 HC RX 637 (ALT 250 FOR IP): Performed by: FAMILY MEDICINE

## 2022-10-25 PROCEDURE — 87899 AGENT NOS ASSAY W/OPTIC: CPT

## 2022-10-25 PROCEDURE — 2580000003 HC RX 258: Performed by: FAMILY MEDICINE

## 2022-10-25 PROCEDURE — 84100 ASSAY OF PHOSPHORUS: CPT

## 2022-10-25 PROCEDURE — 2700000000 HC OXYGEN THERAPY PER DAY

## 2022-10-25 PROCEDURE — 87449 NOS EACH ORGANISM AG IA: CPT

## 2022-10-25 PROCEDURE — 80053 COMPREHEN METABOLIC PANEL: CPT

## 2022-10-25 PROCEDURE — 87070 CULTURE OTHR SPECIMN AEROBIC: CPT

## 2022-10-25 PROCEDURE — 2000000000 HC ICU R&B

## 2022-10-25 PROCEDURE — 83036 HEMOGLOBIN GLYCOSYLATED A1C: CPT

## 2022-10-25 PROCEDURE — 94640 AIRWAY INHALATION TREATMENT: CPT

## 2022-10-25 PROCEDURE — 82803 BLOOD GASES ANY COMBINATION: CPT

## 2022-10-25 PROCEDURE — 51702 INSERT TEMP BLADDER CATH: CPT

## 2022-10-25 PROCEDURE — 86738 MYCOPLASMA ANTIBODY: CPT

## 2022-10-25 PROCEDURE — 84443 ASSAY THYROID STIM HORMONE: CPT

## 2022-10-25 PROCEDURE — 2500000003 HC RX 250 WO HCPCS: Performed by: FAMILY MEDICINE

## 2022-10-25 RX ORDER — LORAZEPAM 2 MG/ML
INJECTION INTRAMUSCULAR
Status: DISPENSED
Start: 2022-10-25 | End: 2022-10-25

## 2022-10-25 RX ORDER — OXYCODONE HYDROCHLORIDE 5 MG/1
5 TABLET ORAL EVERY 4 HOURS PRN
Status: DISCONTINUED | OUTPATIENT
Start: 2022-10-25 | End: 2022-10-25

## 2022-10-25 RX ORDER — GABAPENTIN 400 MG/1
800 CAPSULE ORAL EVERY EVENING
COMMUNITY

## 2022-10-25 RX ORDER — ACETYLCYSTEINE 200 MG/ML
600 SOLUTION ORAL; RESPIRATORY (INHALATION) 2 TIMES DAILY
Status: COMPLETED | OUTPATIENT
Start: 2022-10-25 | End: 2022-10-25

## 2022-10-25 RX ORDER — METOPROLOL TARTRATE 5 MG/5ML
5 INJECTION INTRAVENOUS EVERY 6 HOURS
Status: DISCONTINUED | OUTPATIENT
Start: 2022-10-25 | End: 2022-10-26

## 2022-10-25 RX ORDER — PREDNISONE 20 MG/1
20 TABLET ORAL 2 TIMES DAILY
Status: DISCONTINUED | OUTPATIENT
Start: 2022-10-25 | End: 2022-10-25

## 2022-10-25 RX ORDER — LORAZEPAM 2 MG/ML
2 INJECTION INTRAMUSCULAR ONCE
Status: DISCONTINUED | OUTPATIENT
Start: 2022-10-25 | End: 2022-10-25

## 2022-10-25 RX ORDER — LEVOFLOXACIN 5 MG/ML
750 INJECTION, SOLUTION INTRAVENOUS EVERY 24 HOURS
Status: DISCONTINUED | OUTPATIENT
Start: 2022-10-25 | End: 2022-10-25 | Stop reason: ALTCHOICE

## 2022-10-25 RX ORDER — LEVOFLOXACIN 5 MG/ML
750 INJECTION, SOLUTION INTRAVENOUS EVERY 24 HOURS
Status: DISCONTINUED | OUTPATIENT
Start: 2022-10-25 | End: 2022-10-27 | Stop reason: HOSPADM

## 2022-10-25 RX ORDER — LORAZEPAM 2 MG/ML
2 INJECTION INTRAMUSCULAR ONCE
Status: COMPLETED | OUTPATIENT
Start: 2022-10-25 | End: 2022-10-25

## 2022-10-25 RX ORDER — METHYLPREDNISOLONE SODIUM SUCCINATE 125 MG/2ML
60 INJECTION, POWDER, LYOPHILIZED, FOR SOLUTION INTRAMUSCULAR; INTRAVENOUS DAILY
Status: DISCONTINUED | OUTPATIENT
Start: 2022-10-25 | End: 2022-10-27

## 2022-10-25 RX ORDER — AZITHROMYCIN 250 MG/1
500 TABLET, FILM COATED ORAL DAILY
Status: DISCONTINUED | OUTPATIENT
Start: 2022-10-25 | End: 2022-10-25

## 2022-10-25 RX ORDER — OXYCODONE HYDROCHLORIDE 5 MG/1
10 TABLET ORAL EVERY 4 HOURS PRN
Status: DISCONTINUED | OUTPATIENT
Start: 2022-10-25 | End: 2022-10-27 | Stop reason: HOSPADM

## 2022-10-25 RX ORDER — OXYCODONE HYDROCHLORIDE 5 MG/1
10 TABLET ORAL EVERY 4 HOURS PRN
Status: DISCONTINUED | OUTPATIENT
Start: 2022-10-25 | End: 2022-10-25

## 2022-10-25 RX ORDER — MORPHINE SULFATE 2 MG/ML
4 INJECTION, SOLUTION INTRAMUSCULAR; INTRAVENOUS ONCE
Status: COMPLETED | OUTPATIENT
Start: 2022-10-25 | End: 2022-10-25

## 2022-10-25 RX ADMIN — METOPROLOL TARTRATE 5 MG: 5 INJECTION INTRAVENOUS at 10:38

## 2022-10-25 RX ADMIN — INSULIN LISPRO 25 UNITS: 100 INJECTION, SOLUTION INTRAVENOUS; SUBCUTANEOUS at 17:53

## 2022-10-25 RX ADMIN — PANTOPRAZOLE SODIUM 40 MG: 40 TABLET, DELAYED RELEASE ORAL at 21:11

## 2022-10-25 RX ADMIN — MORPHINE SULFATE 4 MG: 2 INJECTION, SOLUTION INTRAMUSCULAR; INTRAVENOUS at 02:11

## 2022-10-25 RX ADMIN — METOPROLOL TARTRATE 5 MG: 5 INJECTION INTRAVENOUS at 22:36

## 2022-10-25 RX ADMIN — OXYCODONE HYDROCHLORIDE 20 MG: 15 TABLET ORAL at 22:47

## 2022-10-25 RX ADMIN — ENOXAPARIN SODIUM 30 MG: 100 INJECTION SUBCUTANEOUS at 21:11

## 2022-10-25 RX ADMIN — INSULIN GLARGINE 56 UNITS: 100 INJECTION, SOLUTION SUBCUTANEOUS at 10:36

## 2022-10-25 RX ADMIN — INSULIN LISPRO 4 UNITS: 100 INJECTION, SOLUTION INTRAVENOUS; SUBCUTANEOUS at 17:52

## 2022-10-25 RX ADMIN — GUAIFENESIN 200 MG: 200 SOLUTION ORAL at 01:32

## 2022-10-25 RX ADMIN — INSULIN GLARGINE 56 UNITS: 100 INJECTION, SOLUTION SUBCUTANEOUS at 21:11

## 2022-10-25 RX ADMIN — SODIUM CHLORIDE, PRESERVATIVE FREE 5 ML: 5 INJECTION INTRAVENOUS at 21:01

## 2022-10-25 RX ADMIN — ALBUTEROL SULFATE 2.5 MG: 2.5 SOLUTION RESPIRATORY (INHALATION) at 08:21

## 2022-10-25 RX ADMIN — METOPROLOL TARTRATE 5 MG: 5 INJECTION INTRAVENOUS at 15:55

## 2022-10-25 RX ADMIN — INSULIN LISPRO 4 UNITS: 100 INJECTION, SOLUTION INTRAVENOUS; SUBCUTANEOUS at 21:11

## 2022-10-25 RX ADMIN — INSULIN LISPRO 4 UNITS: 100 INJECTION, SOLUTION INTRAVENOUS; SUBCUTANEOUS at 12:08

## 2022-10-25 RX ADMIN — GABAPENTIN 400 MG: 400 CAPSULE ORAL at 21:11

## 2022-10-25 RX ADMIN — ACETAMINOPHEN 650 MG: 325 TABLET, FILM COATED ORAL at 00:06

## 2022-10-25 RX ADMIN — AZITHROMYCIN DIHYDRATE 500 MG: 500 INJECTION, POWDER, LYOPHILIZED, FOR SOLUTION INTRAVENOUS at 12:09

## 2022-10-25 RX ADMIN — LORAZEPAM 2 MG: 2 INJECTION INTRAMUSCULAR at 06:48

## 2022-10-25 RX ADMIN — ALBUTEROL SULFATE 2.5 MG: 2.5 SOLUTION RESPIRATORY (INHALATION) at 15:33

## 2022-10-25 RX ADMIN — ALBUTEROL SULFATE 2.5 MG: 2.5 SOLUTION RESPIRATORY (INHALATION) at 19:40

## 2022-10-25 RX ADMIN — ENOXAPARIN SODIUM 30 MG: 100 INJECTION SUBCUTANEOUS at 10:38

## 2022-10-25 RX ADMIN — ZOLPIDEM TARTRATE 10 MG: 5 TABLET ORAL at 22:48

## 2022-10-25 RX ADMIN — TUBERCULIN PURIFIED PROTEIN DERIVATIVE 5 UNITS: 5 INJECTION, SOLUTION INTRADERMAL at 10:39

## 2022-10-25 RX ADMIN — OXYCODONE 10 MG: 5 TABLET ORAL at 15:50

## 2022-10-25 RX ADMIN — INSULIN LISPRO 2 UNITS: 100 INJECTION, SOLUTION INTRAVENOUS; SUBCUTANEOUS at 08:56

## 2022-10-25 RX ADMIN — ALBUTEROL SULFATE 2.5 MG: 2.5 SOLUTION RESPIRATORY (INHALATION) at 11:42

## 2022-10-25 RX ADMIN — ACETYLCYSTEINE 600 MG: 200 SOLUTION ORAL; RESPIRATORY (INHALATION) at 19:40

## 2022-10-25 RX ADMIN — ACETAMINOPHEN 650 MG: 650 SUPPOSITORY RECTAL at 08:01

## 2022-10-25 RX ADMIN — ACETYLCYSTEINE 600 MG: 200 SOLUTION ORAL; RESPIRATORY (INHALATION) at 15:33

## 2022-10-25 RX ADMIN — LEVOFLOXACIN 750 MG: 5 INJECTION, SOLUTION INTRAVENOUS at 10:36

## 2022-10-25 RX ADMIN — OXYCODONE HYDROCHLORIDE 30 MG: 15 TABLET ORAL at 01:32

## 2022-10-25 RX ADMIN — METHYLPREDNISOLONE SODIUM SUCCINATE 60 MG: 125 INJECTION, POWDER, FOR SOLUTION INTRAMUSCULAR; INTRAVENOUS at 10:38

## 2022-10-25 RX ADMIN — SODIUM CHLORIDE, PRESERVATIVE FREE 10 ML: 5 INJECTION INTRAVENOUS at 10:53

## 2022-10-25 ASSESSMENT — PAIN - FUNCTIONAL ASSESSMENT: PAIN_FUNCTIONAL_ASSESSMENT: ACTIVITIES ARE NOT PREVENTED

## 2022-10-25 ASSESSMENT — PAIN DESCRIPTION - DESCRIPTORS
DESCRIPTORS: ACHING
DESCRIPTORS: ACHING

## 2022-10-25 ASSESSMENT — PAIN DESCRIPTION - ORIENTATION
ORIENTATION: POSTERIOR
ORIENTATION: LOWER;POSTERIOR

## 2022-10-25 ASSESSMENT — PAIN SCALES - GENERAL
PAINLEVEL_OUTOF10: 7
PAINLEVEL_OUTOF10: 6
PAINLEVEL_OUTOF10: 6
PAINLEVEL_OUTOF10: 10
PAINLEVEL_OUTOF10: 10

## 2022-10-25 ASSESSMENT — PAIN DESCRIPTION - LOCATION
LOCATION: BACK
LOCATION: BACK

## 2022-10-25 NOTE — CARE COORDINATION
Discharge planning was discussed with the Critical Care Interdisciplinary Team. He is requiring oxygen above his baseline and is not medically ready for discharge. Case Management will continue to follow him for discharge planning needs.

## 2022-10-25 NOTE — PROGRESS NOTES
0630-rapid response called due to pt desat at 75% on 15L high flow NC. Restraints removed. 0634-75% O2 via non-rebreather  MD Danica Brandt arrives, 2mg ativan verbal order given. 7689- 77% O2    0642- ABG and chest x-ray ordered, 91% O2    0646- respiratory arrives- ABG drawn  Radiologist arrived for x-ray    0648-STAT ativan IV given    2417- decision made to transfer to ICU    0720- report given to Radha Becerril in ICU.

## 2022-10-25 NOTE — PLAN OF CARE
Problem: Discharge Planning  Goal: Discharge to home or other facility with appropriate resources  Outcome: Progressing  Flowsheets (Taken 10/24/2022 1932)  Discharge to home or other facility with appropriate resources:   Identify barriers to discharge with patient and caregiver   Identify discharge learning needs (meds, wound care, etc)     Problem: Pain  Goal: Verbalizes/displays adequate comfort level or baseline comfort level  Outcome: Progressing     Problem: Safety - Adult  Goal: Free from fall injury  Outcome: Progressing     Problem: Safety - Medical Restraint  Goal: Remains free of injury from restraints (Restraint for Interference with Medical Device)  Description: INTERVENTIONS:  1. Determine that other, less restrictive measures have been tried or would not be effective before applying the restraint  2. Evaluate the patient's condition at the time of restraint application  3. Inform patient/family regarding the reason for restraint  4.  Q2H: Monitor safety, psychosocial status, comfort, nutrition and hydration  Outcome: Progressing  Flowsheets (Taken 10/25/2022 0031 by Brandi Kirby RN)  Remains free of injury from restraints (restraint for interference with medical device): Every 2 hours: Monitor safety, psychosocial status, comfort, nutrition and hydration

## 2022-10-25 NOTE — PROGRESS NOTES
Hospitalist Progress Note   Admit Date:  10/23/2022  8:45 PM   Name:  Jamaica Lopez   Age:  74 y.o. Sex:  male  :  1955   MRN:  [id number]   Room:  [facility]    Presenting Complaint: No chief complaint on file. Reason(s) for Admission: Flu [J11.1]     Hospital Course:   74 y.o. male with medical history of HTN, DM2, COPD, MISTI who presented to the [facility] ED with SOB, cough, myalgias. Patient reports symptoms started about 3 days ago. Associated congestion and wheezing. Reports that significant other also same symptoms. Upon ER evaluation, CXR is clear. Labs are okay. Flu A is POSITIVE. Patient noted to be hypoxic to 86% on RA, so he was placed on 2lpm supplemental O2. Hospitalist to admit. Transfer to [facility] for further care. Subjective & 24hr Events (10/25/22): Had a rapid response called overnight with ABG showing hypercapnia. He became confused. We placed him on BiPAP this morning with a full facemask and he briefly required restraints. After few hours of BiPAP his mental status improved and this afternoon he is conversational and pleasant as he was previously.   Lactic      Assessment & Plan:   Chest pain  Assessment & Plan  resolved    * Flu  Assessment & Plan  Flu A positive, Upon arrival to [facility], has now been placed on 5lpm as he was hypoxic on the 2lpm  - Does have h/o COPD and MISTI  - As symptoms started 3 days ago, no indication to start Tamiflu  - Continue breathing treatments  - Wean O2 as tolerated    10/25/2022: continue to wean as tolerated, briefly required BiPAP this morning for hypercapnia and confusion    Type 2 diabetes mellitus with hyperglycemia  Assessment & Plan  -Basal 56U  -Prandial 25U  -Sliding scale insulin    MISTI treated with BiPAP  Assessment & Plan  - Continue home BiPAP/CPAP    Chronic low back pain  Assessment & Plan  - Continue home meds  10/25/2022: Increased dose of OxyContin for worsening pain    Class 3 severe obesity due to excess calories with serious comorbidity and body mass index (BMI) of 45.0 to 49.9 in adult [facility])  Assessment & Plan  Increased risk of all cause mortality, complicating care  - healthy lifestyle at discharge    Type 2 diabetes mellitus with hyperglycemia, with long-term current use of insulin (HCC)  Assessment & Plan  - Continue home insulins  - Holding PO meds  - SSI coverage ordered  - Diabetic diet    COPD (chronic obstructive pulmonary disease) ([facility])  Assessment & Plan  - Continue home albuterol  - Not in overt exacerbation, however likely worsening respiratory symptoms of flu  - CXR is clear    High cholesterol  Assessment & Plan  - Continue home statin    Spinal stenosis, lumbar region, with neurogenic claudication  Assessment & Plan  - Of note  - Home meds    Essential hypertension, benign  Assessment & Plan  - Home meds    Patient is critically ill. Without intervention, there is a high probability of acute organ impairment or life-threatening deterioration in the patient's condition from: Acute respiratory failure  Critical care interventions: NIPPV titration, multiple bedside evaluations  Total critical care time spent: 37 minutes. Time is indicative of direct patient attendance at bedside and on the patient's floor nearby. Includes time spent at bedside performing history and exam, performing chart review, discussing findings and treatment plan with patient and/or family, discussing patient with nursing staff, consultants and colleagues, and ordering/reviewing pertinent laboratory and radiographic evaluations. Time excludes procedures. CPT:  72726: First 30-74 minutes  86603: Each block of 30 min. beyond 76          Anticipated discharge needs:    Home with home oxygen    Diet:  ADULT DIET; Regular; 4 carb choices (60 gm/meal);  Low Fat/Low Chol/High Fiber/2 gm Na  DVT PPx: Enoxaparin  Code status: Full Code    Hospital Problems:  Principal Problem:    Flu  Active Problems:    Chest pain    Type 2 diabetes mellitus with hyperglycemia    Essential hypertension, benign    Spinal stenosis, lumbar region, with neurogenic claudication    High cholesterol    COPD (chronic obstructive pulmonary disease) (HCC)    Type 2 diabetes mellitus with hyperglycemia, with long-term current use of insulin (HCC)    Class 3 severe obesity due to excess calories with serious comorbidity and body mass index (BMI) of 45.0 to 49.9 in adult [facility])    Chronic low back pain    MISTI treated with BiPAP  Resolved Problems:    * No resolved hospital problems.  *      Objective:   Patient Vitals for the past 24 hrs:   Temp Pulse Resp BP SpO2   10/25/22 1214 99 °F (37.2 °C) (!) 102 23 130/78 97 %   10/25/22 1142 -- (!) 121 22 -- 92 %   10/25/22 1123 97 °F (36.1 °C) (!) 118 20 -- 91 %   10/25/22 0859 (!) 102.6 °F (39.2 °C) -- -- -- --   10/25/22 0821 -- (!) 121 27 -- 90 %   10/25/22 0750 (!) 102.4 °F (39.1 °C) (!) 123 25 130/71 92 %   10/25/22 0733 -- (!) 127 28 -- 90 %   10/25/22 0732 -- (!) 129 28 -- (!) 88 %   10/25/22 0711 -- -- (!) 31 -- --   10/25/22 0710 (!) 101.1 °F (38.4 °C) (!) 134 26 (!) 174/84 96 %   10/25/22 0400 (!) 101.7 °F (38.7 °C) (!) 117 21 126/66 94 %   10/25/22 0201 100 °F (37.8 °C) (!) 129 24 (!) 192/95 93 %   10/25/22 0145 100 °F (37.8 °C) (!) 135 30 -- 92 %   10/25/22 0028 -- (!) 119 22 -- 92 %   10/25/22 0015 -- (!) 126 26 -- (!) 89 %   10/25/22 0000 (!) 101.8 °F (38.8 °C) (!) 133 (!) 32 -- (!) 89 %   10/24/22 2259 100.4 °F (38 °C) (!) 118 20 138/61 95 %   10/24/22 2029 -- (!) 110 16 -- 98 %   10/24/22 2000 98.8 °F (37.1 °C) (!) 110 21 (!) 127/91 93 %   10/24/22 1653 97.9 °F (36.6 °C) (!) 106 20 129/72 92 %       Oxygen Therapy  SpO2: 97 %  Pulse via Oximetry: 100 beats per minute  Pulse Oximeter Device Mode: Continuous  Pulse Oximeter Device Location: Other(comment) (ear)  O2 Device: High flow nasal cannula  Skin Assessment: Clean, dry, & intact  O2 Flow Rate (L/min): 7 L/min  Blood Gas  Performed?: Yes  Jeremías's Test #1: Collateral flow confirmed  Site #1: Right Radial  Site Prepped #1: Yes  Number of Attempts #1: 1  Pressure Held #1: Yes  Complications #1: None  Post-procedure #1: Standard  Specimen Status #1: Point of care  How Tolerated?: Other (Comment)    Estimated body mass index is 45.78 kg/m² as calculated from the following:    Height as of an earlier encounter on 10/23/22: 5' 9\" (1.753 m). Weight as of an earlier encounter on 10/23/22: 310 lb (140.6 kg). Intake/Output Summary (Last 24 hours) at 10/25/2022 1534  Last data filed at 10/25/2022 0732  Gross per 24 hour   Intake --   Output 850 ml   Net -850 ml       Blood pressure 130/78, pulse (!) 102, temperature 99 °F (37.2 °C), temperature source Axillary, resp. rate 23, SpO2 97 %. Physical Exam  Vitals and nursing note reviewed. Constitutional:       General: He is not in acute distress. Appearance: He is morbidly obese. He is ill-appearing. He is not diaphoretic. Comments: Seen multiple times, on BiPAP and now back to nasal cannula   HENT:      Head: Normocephalic and atraumatic. Eyes:      Extraocular Movements: Extraocular movements intact. Cardiovascular:      Rate and Rhythm: Normal rate and regular rhythm. Pulses: Normal pulses. Pulmonary:      Effort: Pulmonary effort is normal. No respiratory distress. Breath sounds: Wheezing and rhonchi present. Abdominal:      General: There is no distension. Musculoskeletal:         General: No deformity. Right lower leg: No edema. Left lower leg: No edema. Skin:     Coloration: Skin is not jaundiced or pale. Neurological:      General: No focal deficit present. Mental Status: He is alert and oriented to person, place, and time. Psychiatric:         Mood and Affect: Mood normal.         Behavior: Behavior normal. Behavior is cooperative.          I have personally reviewed labs and tests showing:  Recent Labs:  Recent Results (from the past 48 hour(s))   COVID-19, Rapid    Collection Time: 10/23/22  4:34 PM    Specimen: Nasopharyngeal   Result Value Ref Range    Source Nasopharyngeal      SARS-CoV-2, Rapid Not detected NOTD     Rapid influenza A/B antigens    Collection Time: 10/23/22  4:34 PM    Specimen: Nasal Washing   Result Value Ref Range    Influenza A Ag Positive (A) NEG      Influenza B Ag Negative NEG      Source Nasopharyngeal     CBC with Auto Differential    Collection Time: 10/23/22  5:07 PM   Result Value Ref Range    WBC 5.8 4.3 - 11.1 K/uL    RBC 4.85 4.23 - 5.60 M/uL    Hemoglobin 13.8 13.6 - 17.2 g/dL    Hematocrit 42.9 41.1 - 50.3 %    MCV 88.5 82.0 - 102.0 FL    MCH 28.5 26.1 - 32.9 PG    MCHC 32.2 31.4 - 35.0 g/dL    RDW 13.3 11.9 - 14.6 %    Platelets 692 334 - 397 K/uL    MPV 10.9 9.4 - 12.3 FL    nRBC 0.00 0.0 - 0.2 K/uL    Differential Type AUTOMATED      Seg Neutrophils 69 43 - 78 %    Lymphocytes 19 13 - 44 %    Monocytes 12 4.0 - 12.0 %    Eosinophils % 0 (L) 0.5 - 7.8 %    Basophils 0 0.0 - 2.0 %    Immature Granulocytes 0 0.0 - 5.0 %    Segs Absolute 4.0 1.7 - 8.2 K/UL    Absolute Lymph # 1.1 0.5 - 4.6 K/UL    Absolute Mono # 0.7 0.1 - 1.3 K/UL    Absolute Eos # 0.0 0.0 - 0.8 K/UL    Basophils Absolute 0.0 0.0 - 0.2 K/UL    Absolute Immature Granulocyte 0.0 0.0 - 0.5 K/UL   Comprehensive Metabolic Panel    Collection Time: 10/23/22  5:07 PM   Result Value Ref Range    Sodium 139 133 - 143 mmol/L    Potassium 4.5 3.5 - 5.1 mmol/L    Chloride 96 (L) 98 - 107 mmol/L    CO2 33 (H) 21 - 32 mmol/L    Anion Gap 10 2 - 11 mmol/L    Glucose 170 (H) 65 - 100 mg/dL    BUN 19 (H) 7.0 - 18.0 MG/DL    Creatinine 0.84 0.8 - 1.5 MG/DL    Est, Glom Filt Rate >60 >60 ml/min/1.73m2    Calcium 8.7 8.3 - 10.4 MG/DL    Total Bilirubin 0.3 0.2 - 1.1 MG/DL    ALT 31 13.0 - 61.0 U/L    AST 49 (H) 15 - 37 U/L    Alk Phosphatase 71 45.0 - 117.0 U/L    Total Protein 7.2 6.4 - 8.2 g/dL    Albumin 4.2 3.2 - 4.6 g/dL    Globulin 3.0 2.8 - 4.5 g/dL    Albumin/Globulin Ratio 1.4 0.4 - 1.6     Magnesium    Collection Time: 10/23/22  5:07 PM   Result Value Ref Range    Magnesium 1.8 1.2 - 2.6 mg/dL   Culture, Blood 1    Collection Time: 10/23/22  5:07 PM    Specimen: Blood   Result Value Ref Range    Special Requests NO SPECIAL REQUESTS  RIGHT  Antecubital        Culture NO GROWTH 2 DAYS     Culture, Blood 1    Collection Time: 10/23/22  5:07 PM    Specimen: Blood   Result Value Ref Range    Special Requests NO SPECIAL REQUESTS  LEFT  FOREARM        Culture NO GROWTH 2 DAYS     POCT Glucose    Collection Time: 10/23/22  9:56 PM   Result Value Ref Range    POC Glucose 320 (H) 65 - 100 mg/dL    Performed by:  [employer]    Comprehensive Metabolic Panel w/ Reflex to MG    Collection Time: 10/24/22  4:29 AM   Result Value Ref Range    Sodium 134 133 - 143 mmol/L    Potassium 4.8 3.5 - 5.1 mmol/L    Chloride 94 (L) 101 - 110 mmol/L    CO2 33 (H) 21 - 32 mmol/L    Anion Gap 7 2 - 11 mmol/L    Glucose 442 (H) 65 - 100 mg/dL    BUN 21 8 - 23 MG/DL    Creatinine 1.08 0.8 - 1.5 MG/DL    Est, Glom Filt Rate >60 >60 ml/min/1.73m2    Calcium 8.4 8.3 - 10.4 MG/DL    Total Bilirubin 0.4 0.2 - 1.1 MG/DL    ALT 42 12 - 65 U/L    AST 30 15 - 37 U/L    Alk Phosphatase 66 50 - 136 U/L    Total Protein 6.6 6.3 - 8.2 g/dL    Albumin 3.3 3.2 - 4.6 g/dL    Globulin 3.3 2.8 - 4.5 g/dL    Albumin/Globulin Ratio 1.0 0.4 - 1.6     CBC with Auto Differential    Collection Time: 10/24/22  4:29 AM   Result Value Ref Range    WBC 6.0 4.3 - 11.1 K/uL    RBC 4.76 4.23 - 5.6 M/uL    Hemoglobin 13.3 (L) 13.6 - 17.2 g/dL    Hematocrit 42.2 41.1 - 50.3 %    MCV 88.7 82.0 - 102.0 FL    MCH 27.9 26.1 - 32.9 PG    MCHC 31.5 31.4 - 35.0 g/dL    RDW 13.2 11.9 - 14.6 %    Platelets 452 648 - 143 K/uL    MPV 10.9 9.4 - 12.3 FL    nRBC 0.00 0.0 - 0.2 K/uL    Differential Type AUTOMATED      Seg Neutrophils 83 (H) 43 - 78 %    Lymphocytes 11 (L) 13 - 44 %    Monocytes 5 4.0 - 12.0 %    Eosinophils % 0 (L) 0.5 - 7.8 %    Basophils 0 0.0 - 2.0 %    Immature Granulocytes 1 0.0 - 5.0 %    Segs Absolute 5.0 1.7 - 8.2 K/UL    Absolute Lymph # 0.7 0.5 - 4.6 K/UL    Absolute Mono # 0.3 0.1 - 1.3 K/UL    Absolute Eos # 0.0 0.0 - 0.8 K/UL    Basophils Absolute 0.0 0.0 - 0.2 K/UL    Absolute Immature Granulocyte 0.0 0.0 - 0.5 K/UL   POCT Glucose    Collection Time: 10/24/22  7:35 AM   Result Value Ref Range    POC Glucose 444 (H) 65 - 100 mg/dL    Performed by: [employer]    POCT Glucose    Collection Time: 10/24/22  9:16 AM   Result Value Ref Range    POC Glucose 346 (H) 65 - 100 mg/dL    Performed by: [employer]    POCT Glucose    Collection Time: 10/24/22 11:45 AM   Result Value Ref Range    POC Glucose 315 (H) 65 - 100 mg/dL    Performed by: Jay Mercado    EKG 12 Lead    Collection Time: 10/24/22 11:49 AM   Result Value Ref Range    Ventricular Rate 93 BPM    Atrial Rate 93 BPM    P-R Interval 182 ms    QRS Duration 84 ms    Q-T Interval 352 ms    QTc Calculation (Bazett) 437 ms    P Axis 32 degrees    R Axis 61 degrees    T Axis 52 degrees    Diagnosis Normal sinus rhythm    Troponin    Collection Time: 10/24/22 12:54 PM   Result Value Ref Range    Troponin, High Sensitivity 10.7 0 - 14 pg/mL   Brain Natriuretic Peptide    Collection Time: 10/24/22 12:54 PM   Result Value Ref Range    NT Pro- (H) 5 - 125 PG/ML   Troponin    Collection Time: 10/24/22  3:15 PM   Result Value Ref Range    Troponin, High Sensitivity 11.8 0 - 14 pg/mL   POCT Glucose    Collection Time: 10/24/22  4:51 PM   Result Value Ref Range    POC Glucose 350 (H) 65 - 100 mg/dL    Performed by: Jay Mercado    Troponin    Collection Time: 10/24/22  5:21 PM   Result Value Ref Range    Troponin, High Sensitivity 11.3 0 - 14 pg/mL   POCT Glucose    Collection Time: 10/24/22  9:06 PM   Result Value Ref Range    POC Glucose 221 (H) 65 - 100 mg/dL    Performed by: Rocky Minaya    POCT Glucose    Collection Time: 10/24/22 11:21 PM   Result Value Ref Range    POC Glucose 265 (H) 65 - 100 mg/dL    Performed by: Delgado    POC Blood, Cord, Arterial    Collection Time: 10/24/22 11:25 PM   Result Value Ref Range    DEVICE NASAL CANNULA      FIO2 40 %    pH, Arterial, POC 7.47 (H) 7.35 - 7.45      pCO2, Arterial, POC 41.2 35 - 45 MMHG    pO2, Arterial, POC 57 (L) 75 - 100 MMHG    HCO3, Mixed 29.9 (H) 22 - 26 MMOL/L    SO2c, Arterial, POC 91.0 (L) 95 - 98 %    Base Excess 5.7 mmol/L    POC Jeremías's Test Positive      Site RIGHT RADIAL      Specimen type: ARTERIAL      Performed by: Elli    Procalcitonin    Collection Time: 10/25/22  4:29 AM   Result Value Ref Range    Procalcitonin 0.10 0.00 - 0.49 ng/mL   Comprehensive Metabolic Panel w/ Reflex to MG    Collection Time: 10/25/22  5:52 AM   Result Value Ref Range    Sodium 132 (L) 133 - 143 mmol/L    Potassium 4.5 3.5 - 5.1 mmol/L    Chloride 93 (L) 101 - 110 mmol/L    CO2 35 (H) 21 - 32 mmol/L    Anion Gap 4 2 - 11 mmol/L    Glucose 271 (H) 65 - 100 mg/dL    BUN 21 8 - 23 MG/DL    Creatinine 1.12 0.8 - 1.5 MG/DL    Est, Glom Filt Rate >60 >60 ml/min/1.73m2    Calcium 8.5 8.3 - 10.4 MG/DL    Total Bilirubin 0.5 0.2 - 1.1 MG/DL    ALT 36 12 - 65 U/L    AST 24 15 - 37 U/L    Alk Phosphatase 59 50 - 136 U/L    Total Protein 7.0 6.3 - 8.2 g/dL    Albumin 3.3 3.2 - 4.6 g/dL    Globulin 3.7 2.8 - 4.5 g/dL    Albumin/Globulin Ratio 0.9 0.4 - 1.6     CBC with Auto Differential    Collection Time: 10/25/22  5:52 AM   Result Value Ref Range    WBC 9.1 4.3 - 11.1 K/uL    RBC 4.78 4.23 - 5.6 M/uL    Hemoglobin 13.3 (L) 13.6 - 17.2 g/dL    Hematocrit 42.7 41.1 - 50.3 %    MCV 89.3 82.0 - 102.0 FL    MCH 27.8 26.1 - 32.9 PG    MCHC 31.1 (L) 31.4 - 35.0 g/dL    RDW 13.5 11.9 - 14.6 %    Platelets 051 479 - 116 K/uL    MPV 10.3 9.4 - 12.3 FL    nRBC 0.00 0.0 - 0.2 K/uL    Differential Type AUTOMATED      Seg Neutrophils 78 43 - 78 %    Lymphocytes 13 13 - 44 %    Monocytes 9 4.0 - 12.0 %    Eosinophils % 0 (L) 0.5 - 7.8 % Basophils 0 0.0 - 2.0 %    Immature Granulocytes 0 0.0 - 5.0 %    Segs Absolute 7.1 1.7 - 8.2 K/UL    Absolute Lymph # 1.2 0.5 - 4.6 K/UL    Absolute Mono # 0.8 0.1 - 1.3 K/UL    Absolute Eos # 0.0 0.0 - 0.8 K/UL    Basophils Absolute 0.0 0.0 - 0.2 K/UL    Absolute Immature Granulocyte 0.0 0.0 - 0.5 K/UL   Phosphorus    Collection Time: 10/25/22  5:52 AM   Result Value Ref Range    Phosphorus 5.1 (H) 2.3 - 3.7 MG/DL   Hemoglobin A1C    Collection Time: 10/25/22  5:52 AM   Result Value Ref Range    Hemoglobin A1C 9.1 (H) 4.8 - 5.6 %    eAG 214 mg/dL   TSH with Reflex    Collection Time: 10/25/22  5:52 AM   Result Value Ref Range    TSH w Free Thyroid if Abnormal 0.68 0.358 - 3.740 UIU/ML   POC Blood, Cord, Arterial    Collection Time: 10/25/22  6:57 AM   Result Value Ref Range    DEVICE Non rebreather      FIO2 100 %    pH, Arterial, POC 7.29 (L) 7.35 - 7.45      pCO2, Arterial, POC 72.3 (HH) 35 - 45 MMHG    pO2, Arterial,  (H) 75 - 100 MMHG    HCO3, Mixed 34.6 (H) 22 - 26 MMOL/L    SO2c, Arterial, POC 99.8 (H) 95 - 98 %    Base Excess 5.1 mmol/L    POC Jeremías's Test Positive      Site RIGHT RADIAL      Specimen type: ARTERIAL      Performed by: Tiffany Berman     Critical Value Read Back STONE    POCT Glucose    Collection Time: 10/25/22  7:58 AM   Result Value Ref Range    POC Glucose 248 (H) 65 - 100 mg/dL    Performed by: Kimani    POC Blood, Cord, Arterial    Collection Time: 10/25/22  9:40 AM   Result Value Ref Range    DEVICE BIPAP MASK      pH, Arterial, POC 7.41 7.35 - 7.45      pCO2, Arterial, POC 54.1 (H) 35 - 45 MMHG    pO2, Arterial, POC 67 (L) 75 - 100 MMHG    HCO3, Mixed 34.2 (H) 22 - 26 MMOL/L    SO2c, Arterial, POC 92.6 (L) 95 - 98 %    Base Excess 7.8 mmol/L    POC TIDAL VOLUME 420 ml    POC PEEP 6 cmH2O    POC Pressure Support 10 cmH2O    POC Jeremías's Test Positive      Site RIGHT RADIAL      Specimen type: ARTERIAL      Performed by: Marianna     FIO2 7     POCT Glucose Collection Time: 10/25/22 11:51 AM   Result Value Ref Range    POC Glucose 286 (H) 65 - 100 mg/dL    Performed by: Kimani        I have personally reviewed imaging studies showing: Other Studies:  XR CHEST PORTABLE   Final Result   Nodular interstitial pattern. This could representfailure or   atypical pneumonia. XR CHEST (2 VW)   Final Result   No acute cardiopulmonary abnormality.           Current Meds:  Current Facility-Administered Medications   Medication Dose Route Frequency    levoFLOXacin (LEVAQUIN) 750 MG/150ML infusion 750 mg  750 mg IntraVENous Q24H    azithromycin (ZITHROMAX) 500 mg in sodium chloride 0.9 % 250 mL IVPB ([id number])  500 mg IntraVENous Q24H    methylPREDNISolone sodium (SOLU-MEDROL) injection 60 mg  60 mg IntraVENous Daily    metoprolol (LOPRESSOR) injection 5 mg  5 mg IntraVENous Q6H    tuberculin injection 5 Units  5 Units IntraDERmal Once    acetylcysteine (MUCOMYST) 20 % solution 600 mg  600 mg Inhalation BID    oxyCODONE (ROXICODONE) immediate release tablet 5 mg  5 mg Oral Q4H PRN    oxyCODONE (ROXICODONE) immediate release tablet 10 mg  10 mg Oral Q4H PRN    insulin lispro (HUMALOG) injection vial 0-8 Units  0-8 Units SubCUTAneous TID WC    insulin lispro (HUMALOG) injection vial 0-4 Units  0-4 Units SubCUTAneous Nightly    [Held by provider] amLODIPine (NORVASC) tablet 5 mg  5 mg Oral Daily    [Held by provider] famotidine (PEPCID) tablet 40 mg  40 mg Oral Daily    insulin glargine (LANTUS) injection vial 56 Units  56 Units SubCUTAneous BID    [Held by provider] lisinopril (PRINIVIL;ZESTRIL) tablet 20 mg  20 mg Oral Daily    pantoprazole (PROTONIX) tablet 40 mg  40 mg Oral BID    [Held by provider] tamsulosin (FLOMAX) capsule 0.4 mg  0.4 mg Oral Daily    zolpidem (AMBIEN) tablet 10 mg  10 mg Oral Nightly PRN    [Held by provider] hydroCHLOROthiazide (HYDRODIURIL) tablet 12.5 mg  12.5 mg Oral Daily    [Held by provider] meloxicam (MOBIC) tablet 15 mg  15 mg Oral Daily    [Held by provider] gabapentin (NEURONTIN) capsule 400 mg  400 mg Oral 4x Daily    [Held by provider] atorvastatin (LIPITOR) tablet 20 mg  20 mg Oral Nightly    insulin lispro (HUMALOG) injection vial 25 Units  25 Units SubCUTAneous BID WC    sodium chloride flush 0.9 % injection 5-40 mL  5-40 mL IntraVENous 2 times per day    sodium chloride flush 0.9 % injection 5-40 mL  5-40 mL IntraVENous PRN    0.9 % sodium chloride infusion   IntraVENous PRN    enoxaparin Sodium (LOVENOX) injection 30 mg  30 mg SubCUTAneous BID    ondansetron (ZOFRAN-ODT) disintegrating tablet 4 mg  4 mg Oral Q8H PRN    Or    ondansetron (ZOFRAN) injection 4 mg  4 mg IntraVENous Q6H PRN    polyethylene glycol (GLYCOLAX) packet 17 g  17 g Oral Daily PRN    acetaminophen (TYLENOL) tablet 650 mg  650 mg Oral Q6H PRN    Or    acetaminophen (TYLENOL) suppository 650 mg  650 mg Rectal Q6H PRN    glucose chewable tablet 16 g  4 tablet Oral PRN    dextrose bolus 10% 125 mL  125 mL IntraVENous PRN    Or    dextrose bolus 10% 250 mL  250 mL IntraVENous PRN    glucagon (rDNA) injection 1 mg  1 mg SubCUTAneous PRN    dextrose 10 % infusion   IntraVENous Continuous PRN    albuterol (PROVENTIL) nebulizer solution 2.5 mg  2.5 mg Nebulization 4x daily    guaiFENesin (ROBITUSSIN) 100 MG/5ML oral solution 200 mg  200 mg Oral Q4H PRN       Signed:  Abby Liang MD

## 2022-10-25 NOTE — PROGRESS NOTES
Patient disoriented x4 on Trilogy, unable to safely eat or swallow at this time. . Notified Dr. Duane Goldsmith. Per Dr. Duane Goldsmith, give only sliding scale Humalog and Lantus this AM. Hold the 25 unit scheduled pre-meal Humalog.

## 2022-10-25 NOTE — PROGRESS NOTES
10/25/22 0732 10/25/22 0733   Oxygen Therapy/Pulse Ox   O2 Therapy Oxygen humidified Oxygen humidified   $Oxygen $Daily Charge  --    O2 Device PAP (positive airway pressure) PAP (positive airway pressure)   O2 Flow Rate (L/min) 4.5 L/min  (increased to 6L) 6 L/min   Heart Rate (!) 129 (!) 127   Resp 28 28   SpO2 (!) 88 % 90 %

## 2022-10-25 NOTE — PROGRESS NOTES
CRITICAL CARE OUTREACH NURSE PROGRESS REPORT      SUBJECTIVE: Called to assess patient secondary to RN/provider concern - poor oxygenation and confusion . Vitals:    10/24/22 2259 10/25/22 0000 10/25/22 0015 10/25/22 0028   BP: 138/61      Pulse: (!) 118 (!) 133 (!) 126 (!) 119   Resp: 20 (!) 32 26 22   Temp: 100.4 °F (38 °C) (!) 101.8 °F (38.8 °C)     TempSrc: Axillary Axillary     SpO2: 95% (!) 89% (!) 89% 92%        ASSESSMENT:  Called to assess patient as he had pulled his oxygen off and was found with oxygen saturations in the 70's. He was placed back on oxygen and was able to get his saturations above 90% but was extremely confused and agitated. He was oriented to person only and thought that he was in [place]. He asked to call a family member but could not figure out how to use his phone when we handed it to him. ABG was drawn which did show elevated CO2 levels and low PO2. Pt was placed back on home CPAP with O2 bled in but continued to try to pull it off and get out of bed. Pt was moved to room closer to the nurses station and Zyprexa was given IM per Dr. Phuong Adams. We helped him use the urinal then put him back in the bed with [facility] raised for his comfort. Pt continued to state \"I cant breathe\" and attempting to pull CPAP off. Pt eventually had to be placed in restraints to keep him from pulling off mask (even with staff staying at bedside). Temp was found to be 102.8 so he was given Tylenol to help bring down temp and heart rate. Pt did eventually fall asleep but was struggling to maintain oxygen saturation above 90%. O2 slowly increased until he had an oxygen saturation of 92% with 12L bled into his CPAP. Ny Menard with RT updated. Pt currently resting in bed quietly with tech sitting at bedside. Primary RN will recheck vital signs in about an hour and is aware to call for any concerns. PLAN:  Will follow per ICU Outreach protocol.     [address] [place] [address]: [facility]: [phone]

## 2022-10-25 NOTE — PROGRESS NOTES
CRITICAL CARE OUTREACH NURSE PROGRESS REPORT      SUBJECTIVE: Called to assess patient secondary to RN/provider concern - respiratory distress . Vitals:    10/25/22 0000 10/25/22 0015 10/25/22 0028 10/25/22 0145   BP:       Pulse: (!) 133 (!) 126 (!) 119 (!) 135   Resp: (!) 32 26 22 30   Temp: (!) 101.8 °F (38.8 °C)   100 °F (37.8 °C)   TempSrc: Axillary   Oral   SpO2: (!) 89% (!) 89% 92% 92%        ASSESSMENT:  Pt awake again and very agitated. States that he cannot breathe with the CPAP on. RT swiched him over to high flow nasal cannula at 15L and he is maintaining an oxygen saturation of 90%. Still very restless and appears air hungry with pursed lip breathing. Dr. Tami New was updated and order received for 4mg of morphine. Order placed and primary RN updated. PLAN:  Will follow per ICU Outreach protocol.     [address] [place] [address]: [facility]: [phone]

## 2022-10-25 NOTE — PLAN OF CARE
Problem: Respiratory - Adult  Goal: Achieves optimal ventilation and oxygenation  Flowsheets (Taken 10/25/2022 1948)  Achieves optimal ventilation and oxygenation:   Assess for changes in respiratory status   Position to facilitate oxygenation and minimize respiratory effort   Respiratory therapy support as indicated   Assess for changes in mentation and behavior   Oxygen supplementation based on oxygen saturation or arterial blood gases   Encourage broncho-pulmonary hygiene including cough, deep breathe, incentive spirometry   Assess and instruct to report shortness of breath or any respiratory difficulty

## 2022-10-26 LAB
ALBUMIN SERPL-MCNC: 2.7 G/DL (ref 3.2–4.6)
ALBUMIN/GLOB SERPL: 0.8 {RATIO} (ref 0.4–1.6)
ALP SERPL-CCNC: 54 U/L (ref 50–136)
ALT SERPL-CCNC: 27 U/L (ref 12–65)
ANION GAP SERPL CALC-SCNC: 5 MMOL/L (ref 2–11)
AST SERPL-CCNC: 24 U/L (ref 15–37)
BASOPHILS # BLD: 0 K/UL (ref 0–0.2)
BASOPHILS NFR BLD: 0 % (ref 0–2)
BILIRUB SERPL-MCNC: 0.4 MG/DL (ref 0.2–1.1)
BUN SERPL-MCNC: 17 MG/DL (ref 8–23)
CALCIUM SERPL-MCNC: 8.1 MG/DL (ref 8.3–10.4)
CHLORIDE SERPL-SCNC: 97 MMOL/L (ref 101–110)
CO2 SERPL-SCNC: 34 MMOL/L (ref 21–32)
CREAT SERPL-MCNC: 0.77 MG/DL (ref 0.8–1.5)
DIFFERENTIAL METHOD BLD: ABNORMAL
EOSINOPHIL # BLD: 0 K/UL (ref 0–0.8)
EOSINOPHIL NFR BLD: 0 % (ref 0.5–7.8)
ERYTHROCYTE [DISTWIDTH] IN BLOOD BY AUTOMATED COUNT: 13.2 % (ref 11.9–14.6)
GLOBULIN SER CALC-MCNC: 3.2 G/DL (ref 2.8–4.5)
GLUCOSE BLD STRIP.AUTO-MCNC: 148 MG/DL (ref 65–100)
GLUCOSE BLD STRIP.AUTO-MCNC: 227 MG/DL (ref 65–100)
GLUCOSE BLD STRIP.AUTO-MCNC: 299 MG/DL (ref 65–100)
GLUCOSE BLD STRIP.AUTO-MCNC: 392 MG/DL (ref 65–100)
GLUCOSE SERPL-MCNC: 254 MG/DL (ref 65–100)
HCT VFR BLD AUTO: 39.9 % (ref 41.1–50.3)
HGB BLD-MCNC: 12.7 G/DL (ref 13.6–17.2)
IMM GRANULOCYTES # BLD AUTO: 0 K/UL (ref 0–0.5)
IMM GRANULOCYTES NFR BLD AUTO: 0 % (ref 0–5)
L PNEUMO1 AG UR QL IA: NEGATIVE
LYMPHOCYTES # BLD: 1.1 K/UL (ref 0.5–4.6)
LYMPHOCYTES NFR BLD: 13 % (ref 13–44)
M PNEUMO IGG SER IA-ACNC: 110 U/ML (ref 0–99)
M PNEUMO IGM SER IA-ACNC: <770 U/ML (ref 0–769)
MCH RBC QN AUTO: 27.9 PG (ref 26.1–32.9)
MCHC RBC AUTO-ENTMCNC: 31.8 G/DL (ref 31.4–35)
MCV RBC AUTO: 87.7 FL (ref 82–102)
MONOCYTES # BLD: 0.7 K/UL (ref 0.1–1.3)
MONOCYTES NFR BLD: 8 % (ref 4–12)
NEUTS SEG # BLD: 6.4 K/UL (ref 1.7–8.2)
NEUTS SEG NFR BLD: 78 % (ref 43–78)
NRBC # BLD: 0 K/UL (ref 0–0.2)
PHOSPHATE SERPL-MCNC: 3.7 MG/DL (ref 2.3–3.7)
PLATELET # BLD AUTO: 182 K/UL (ref 150–450)
PMV BLD AUTO: 10.6 FL (ref 9.4–12.3)
POTASSIUM SERPL-SCNC: 4.5 MMOL/L (ref 3.5–5.1)
PROT SERPL-MCNC: 5.9 G/DL (ref 6.3–8.2)
RBC # BLD AUTO: 4.55 M/UL (ref 4.23–5.6)
SERVICE CMNT-IMP: ABNORMAL
SODIUM SERPL-SCNC: 136 MMOL/L (ref 133–143)
SPECIMEN SOURCE: NORMAL
WBC # BLD AUTO: 8.2 K/UL (ref 4.3–11.1)

## 2022-10-26 PROCEDURE — 97530 THERAPEUTIC ACTIVITIES: CPT

## 2022-10-26 PROCEDURE — 6360000002 HC RX W HCPCS: Performed by: FAMILY MEDICINE

## 2022-10-26 PROCEDURE — 6370000000 HC RX 637 (ALT 250 FOR IP): Performed by: FAMILY MEDICINE

## 2022-10-26 PROCEDURE — 2580000003 HC RX 258: Performed by: FAMILY MEDICINE

## 2022-10-26 PROCEDURE — 84100 ASSAY OF PHOSPHORUS: CPT

## 2022-10-26 PROCEDURE — 2000000000 HC ICU R&B

## 2022-10-26 PROCEDURE — 97161 PT EVAL LOW COMPLEX 20 MIN: CPT

## 2022-10-26 PROCEDURE — 94640 AIRWAY INHALATION TREATMENT: CPT

## 2022-10-26 PROCEDURE — 85025 COMPLETE CBC W/AUTO DIFF WBC: CPT

## 2022-10-26 PROCEDURE — 94761 N-INVAS EAR/PLS OXIMETRY MLT: CPT

## 2022-10-26 PROCEDURE — 2700000000 HC OXYGEN THERAPY PER DAY

## 2022-10-26 PROCEDURE — 97165 OT EVAL LOW COMPLEX 30 MIN: CPT

## 2022-10-26 PROCEDURE — 80053 COMPREHEN METABOLIC PANEL: CPT

## 2022-10-26 PROCEDURE — 82962 GLUCOSE BLOOD TEST: CPT

## 2022-10-26 PROCEDURE — 36415 COLL VENOUS BLD VENIPUNCTURE: CPT

## 2022-10-26 PROCEDURE — 97535 SELF CARE MNGMENT TRAINING: CPT

## 2022-10-26 RX ORDER — AMPICILLIN TRIHYDRATE 250 MG
1 CAPSULE ORAL DAILY
COMMUNITY

## 2022-10-26 RX ORDER — ACETAMINOPHEN 160 MG
1 TABLET,DISINTEGRATING ORAL DAILY
COMMUNITY

## 2022-10-26 RX ORDER — INSULIN DEGLUDEC 200 U/ML
160 INJECTION, SOLUTION SUBCUTANEOUS EVERY MORNING
COMMUNITY
Start: 2022-08-26

## 2022-10-26 RX ADMIN — TAMSULOSIN HYDROCHLORIDE 0.4 MG: 0.4 CAPSULE ORAL at 09:14

## 2022-10-26 RX ADMIN — OXYCODONE HYDROCHLORIDE 20 MG: 15 TABLET ORAL at 23:28

## 2022-10-26 RX ADMIN — FAMOTIDINE 40 MG: 20 TABLET, FILM COATED ORAL at 09:14

## 2022-10-26 RX ADMIN — AZITHROMYCIN DIHYDRATE 500 MG: 500 INJECTION, POWDER, LYOPHILIZED, FOR SOLUTION INTRAVENOUS at 11:30

## 2022-10-26 RX ADMIN — INSULIN LISPRO 8 UNITS: 100 INJECTION, SOLUTION INTRAVENOUS; SUBCUTANEOUS at 17:11

## 2022-10-26 RX ADMIN — PANTOPRAZOLE SODIUM 40 MG: 40 TABLET, DELAYED RELEASE ORAL at 09:15

## 2022-10-26 RX ADMIN — METHYLPREDNISOLONE SODIUM SUCCINATE 60 MG: 125 INJECTION, POWDER, FOR SOLUTION INTRAMUSCULAR; INTRAVENOUS at 09:14

## 2022-10-26 RX ADMIN — ALBUTEROL SULFATE 2.5 MG: 2.5 SOLUTION RESPIRATORY (INHALATION) at 20:27

## 2022-10-26 RX ADMIN — GABAPENTIN 400 MG: 400 CAPSULE ORAL at 17:23

## 2022-10-26 RX ADMIN — INSULIN GLARGINE 56 UNITS: 100 INJECTION, SOLUTION SUBCUTANEOUS at 09:15

## 2022-10-26 RX ADMIN — SODIUM CHLORIDE, PRESERVATIVE FREE 10 ML: 5 INJECTION INTRAVENOUS at 21:32

## 2022-10-26 RX ADMIN — INSULIN LISPRO 25 UNITS: 100 INJECTION, SOLUTION INTRAVENOUS; SUBCUTANEOUS at 07:28

## 2022-10-26 RX ADMIN — SODIUM CHLORIDE, PRESERVATIVE FREE 10 ML: 5 INJECTION INTRAVENOUS at 09:16

## 2022-10-26 RX ADMIN — LISINOPRIL 20 MG: 20 TABLET ORAL at 09:14

## 2022-10-26 RX ADMIN — ATORVASTATIN CALCIUM 20 MG: 10 TABLET, FILM COATED ORAL at 23:27

## 2022-10-26 RX ADMIN — INSULIN GLARGINE 56 UNITS: 100 INJECTION, SOLUTION SUBCUTANEOUS at 21:30

## 2022-10-26 RX ADMIN — MELOXICAM 15 MG: 7.5 TABLET ORAL at 09:14

## 2022-10-26 RX ADMIN — ENOXAPARIN SODIUM 30 MG: 100 INJECTION SUBCUTANEOUS at 09:15

## 2022-10-26 RX ADMIN — OXYCODONE HYDROCHLORIDE 20 MG: 15 TABLET ORAL at 17:23

## 2022-10-26 RX ADMIN — GABAPENTIN 400 MG: 400 CAPSULE ORAL at 14:27

## 2022-10-26 RX ADMIN — ALBUTEROL SULFATE 2.5 MG: 2.5 SOLUTION RESPIRATORY (INHALATION) at 15:38

## 2022-10-26 RX ADMIN — HYDROCHLOROTHIAZIDE 12.5 MG: 25 TABLET ORAL at 09:15

## 2022-10-26 RX ADMIN — AMLODIPINE BESYLATE 5 MG: 5 TABLET ORAL at 09:14

## 2022-10-26 RX ADMIN — OXYCODONE 10 MG: 5 TABLET ORAL at 07:30

## 2022-10-26 RX ADMIN — GABAPENTIN 400 MG: 400 CAPSULE ORAL at 23:27

## 2022-10-26 RX ADMIN — ENOXAPARIN SODIUM 30 MG: 100 INJECTION SUBCUTANEOUS at 23:28

## 2022-10-26 RX ADMIN — INSULIN LISPRO 25 UNITS: 100 INJECTION, SOLUTION INTRAVENOUS; SUBCUTANEOUS at 17:11

## 2022-10-26 RX ADMIN — GABAPENTIN 400 MG: 400 CAPSULE ORAL at 09:15

## 2022-10-26 RX ADMIN — OXYCODONE HYDROCHLORIDE 20 MG: 15 TABLET ORAL at 11:49

## 2022-10-26 RX ADMIN — PANTOPRAZOLE SODIUM 40 MG: 40 TABLET, DELAYED RELEASE ORAL at 23:28

## 2022-10-26 RX ADMIN — ALBUTEROL SULFATE 2.5 MG: 2.5 SOLUTION RESPIRATORY (INHALATION) at 08:38

## 2022-10-26 RX ADMIN — INSULIN LISPRO 2 UNITS: 100 INJECTION, SOLUTION INTRAVENOUS; SUBCUTANEOUS at 07:29

## 2022-10-26 RX ADMIN — LEVOFLOXACIN 750 MG: 5 INJECTION, SOLUTION INTRAVENOUS at 11:29

## 2022-10-26 RX ADMIN — ZOLPIDEM TARTRATE 10 MG: 5 TABLET ORAL at 23:27

## 2022-10-26 ASSESSMENT — PAIN SCALES - GENERAL
PAINLEVEL_OUTOF10: 0
PAINLEVEL_OUTOF10: 6
PAINLEVEL_OUTOF10: 0
PAINLEVEL_OUTOF10: 6
PAINLEVEL_OUTOF10: 7

## 2022-10-26 ASSESSMENT — PAIN DESCRIPTION - LOCATION
LOCATION: BACK
LOCATION: BACK

## 2022-10-26 NOTE — PROGRESS NOTES
Hospitalist Progress Note   Admit Date:  10/23/2022  8:45 PM   Name:  Suraj Santa   Age:  74 y.o. Sex:  male  :  1955   MRN:  [id number]   Room:  [facility]/    Presenting Complaint: No chief complaint on file. Reason(s) for Admission: Flu [J11.1]     Hospital Course:   Copied from admission history and physical/prior notes HPI:  74 y.o. male with medical history of HTN, DM2, COPD, MISTI who presented to [facility] ED with SOB, cough, myalgias. Patient reports symptoms started about 3 days ago. Associated congestion and wheezing. Reports that significant other also same symptoms. Upon ER evaluation, CXR is clear. Labs are okay. Flu A is POSITIVE. Patient noted to be hypoxic to 86% on RA, so he was placed on 2lpm supplemental O2. Hospitalist to admit. Transfer to [facility] for further care. Subjective & 24hr Events (10/26/22): Patient without any new complaints-respiratory status improved after resuming external ventilatory support-transferred to ICU after rapid response called with patient poorly responsive with ABG revealing hypoventilation/hypercapnia. He has poor recollection of events. Transient borderline hypotension this morning documented blood pressure 89/59-receiving home meds for hypertension. Patient aware he is influenza A positive. Assessment & Plan:   Chest pain  Assessment & Plan  resolved     * Flu  Assessment & Plan  Flu A positive, Upon arrival to [facility], has now been placed on 5lpm as he was hypoxic on the 2lpm  - Does have h/o COPD and MISTI  - As symptoms started 3 days ago, no indication to start Tamiflu  - Continue breathing treatments  - Wean O2 as tolerated   10/26--continue supportive care-continue current dosing Solu-Medrol regarding exacerbation of COPD. If remains stable consider convert to prednisone as soon as .      Type 2 diabetes mellitus with hyperglycemia  Assessment & Plan  -Basal 56U  -Prandial 25U  -Sliding scale insulin  10/26--monitor closely-borderline control fasting glucose this a.m. to 54-treated follow-up fingerstick sugar 148. Will need to attenuate dosing when steroids were weaned. MISTI treated with BiPAP  Assessment & Plan  - Continue home BiPAP/CPAP     Chronic low back pain  Assessment & Plan  - Continue home meds  10/25/2022: Increased dose of OxyContin for worsening pain  10/26-same therapy. Class 3 severe obesity due to excess calories with serious comorbidity and body mass index (BMI) of 45.0 to 49.9 in adult [facility])  Assessment & Plan  Increased risk of all cause mortality, complicating care  - healthy lifestyle at discharge     Type 2 diabetes mellitus with hyperglycemia, with long-term current use of insulin ([facility])  Assessment & Plan  - Continue home insulins  - Holding PO meds  - SSI coverage ordered  - Diabetic diet     COPD (chronic obstructive pulmonary disease) ([facility])  Assessment & Plan  - Continue home albuterol  - Not in overt exacerbation, however likely worsening respiratory symptoms of flu  - CXR is clear     High cholesterol  Assessment & Plan  - Continue home statin     Spinal stenosis, lumbar region, with neurogenic claudication  Assessment & Plan  - Of note  - Home meds     Essential hypertension, benign  Assessment & Plan  - Home meds                  Anticipated discharge needs:    Home with home oxygen     Diet:  ADULT DIET; Regular; 4 carb choices (60 gm/meal);  Low Fat/Low Chol/High Fiber/2 gm Na  DVT PPx: Enoxaparin  Code status: Full Code              Hospital Problems:  Principal Problem:    Flu  Active Problems:    Type 2 diabetes mellitus with hyperglycemia    Essential hypertension, benign    Spinal stenosis, lumbar region, with neurogenic claudication    Chest pain    High cholesterol    COPD (chronic obstructive pulmonary disease) ([facility])    Type 2 diabetes mellitus with hyperglycemia, with long-term current use of insulin ([facility])    Class 3 severe obesity due to excess Continuous  Pulse Oximeter Device Location: Right, Hand, Finger  O2 Device: High flow nasal cannula  Oximetry Probe Site Changed: No  Skin Assessment: Clean, dry, & intact  Skin Protection for O2 Device: N/A  O2 Flow Rate (L/min): 5 L/min  Blood Gas  Performed?: Yes  Jeremías's Test #1: Collateral flow confirmed  Site #1: Right Radial  Site Prepped #1: Yes  Number of Attempts #1: 1  Pressure Held #1: Yes  Complications #1: None  Post-procedure #1: Standard  Specimen Status #1: Point of care  How Tolerated?: Other (Comment)  Oxygen Therapy: Supplemental oxygen  O2 Delivery Method: CPAP/Bi-PAP mask    Estimated body mass index is 43.33 kg/m² as calculated from the following:    Height as of this encounter: 5' 9\" (1.753 m). Weight as of this encounter: 293 lb 6.4 oz (133.1 kg). Intake/Output Summary (Last 24 hours) at 10/26/2022 1430  Last data filed at 10/26/2022 0557  Gross per 24 hour   Intake 725 ml   Output 2460 ml   Net -1735 ml         Physical Exam:     Blood pressure 104/69, pulse 92, temperature 97.8 °F (36.6 °C), temperature source Temporal, resp. rate 17, height 5' 9\" (1.753 m), weight 293 lb 6.4 oz (133.1 kg), SpO2 91 %. General:    Alert and oriented x3, poor recollection of events as summarized above. He reports now back to baseline by his subjective report. Eyes:  Sclerae appear normal.  Pupils equally round. ENT:  Nares appear mild erythema, subjective nasal congestion. Neck:  No restricted ROM. Trachea midline   CV:   RRR. No m/r/g. No jugular venous distension. Lungs:   CTAB. No wheezing, rhonchi, or rales. Symmetric expansion. Abdomen: Bowel sounds present. Soft, nontender, nondistended. Extremities: No cyanosis or clubbing. No increased peripheral lower extremity edema  Neuro:  CN II-XII grossly intact. Sensation intact.       I have personally reviewed labs and tests showing:  Recent Labs:  Recent Results (from the past 48 hour(s))   Troponin    Collection Time: 10/24/22  3:15 PM   Result Value Ref Range    Troponin, High Sensitivity 11.8 0 - 14 pg/mL   POCT Glucose    Collection Time: 10/24/22  4:51 PM   Result Value Ref Range    POC Glucose 350 (H) 65 - 100 mg/dL    Performed by: Zola Boeck    Troponin    Collection Time: 10/24/22  5:21 PM   Result Value Ref Range    Troponin, High Sensitivity 11.3 0 - 14 pg/mL   POCT Glucose    Collection Time: 10/24/22  9:06 PM   Result Value Ref Range    POC Glucose 221 (H) 65 - 100 mg/dL    Performed by: Hedy Sweeney    POCT Glucose    Collection Time: 10/24/22 11:21 PM   Result Value Ref Range    POC Glucose 265 (H) 65 - 100 mg/dL    Performed by: Hedy Gravsonia    POC Blood, Cord, Arterial    Collection Time: 10/24/22 11:25 PM   Result Value Ref Range    DEVICE NASAL CANNULA      FIO2 40 %    pH, Arterial, POC 7.47 (H) 7.35 - 7.45      pCO2, Arterial, POC 41.2 35 - 45 MMHG    pO2, Arterial, POC 57 (L) 75 - 100 MMHG    HCO3, Mixed 29.9 (H) 22 - 26 MMOL/L    SO2c, Arterial, POC 91.0 (L) 95 - 98 %    Base Excess 5.7 mmol/L    POC Jeremías's Test Positive      Site RIGHT RADIAL      Specimen type: ARTERIAL      Performed by: Elli    Procalcitonin    Collection Time: 10/25/22  4:29 AM   Result Value Ref Range    Procalcitonin 0.10 0.00 - 0.49 ng/mL   Comprehensive Metabolic Panel w/ Reflex to MG    Collection Time: 10/25/22  5:52 AM   Result Value Ref Range    Sodium 132 (L) 133 - 143 mmol/L    Potassium 4.5 3.5 - 5.1 mmol/L    Chloride 93 (L) 101 - 110 mmol/L    CO2 35 (H) 21 - 32 mmol/L    Anion Gap 4 2 - 11 mmol/L    Glucose 271 (H) 65 - 100 mg/dL    BUN 21 8 - 23 MG/DL    Creatinine 1.12 0.8 - 1.5 MG/DL    Est, Glom Filt Rate >60 >60 ml/min/1.73m2    Calcium 8.5 8.3 - 10.4 MG/DL    Total Bilirubin 0.5 0.2 - 1.1 MG/DL    ALT 36 12 - 65 U/L    AST 24 15 - 37 U/L    Alk Phosphatase 59 50 - 136 U/L    Total Protein 7.0 6.3 - 8.2 g/dL    Albumin 3.3 3.2 - 4.6 g/dL    Globulin 3.7 2.8 - 4.5 g/dL    Albumin/Globulin Ratio 0.9 0.4 - 1.6 CBC with Auto Differential    Collection Time: 10/25/22  5:52 AM   Result Value Ref Range    WBC 9.1 4.3 - 11.1 K/uL    RBC 4.78 4.23 - 5.6 M/uL    Hemoglobin 13.3 (L) 13.6 - 17.2 g/dL    Hematocrit 42.7 41.1 - 50.3 %    MCV 89.3 82.0 - 102.0 FL    MCH 27.8 26.1 - 32.9 PG    MCHC 31.1 (L) 31.4 - 35.0 g/dL    RDW 13.5 11.9 - 14.6 %    Platelets 575 299 - 825 K/uL    MPV 10.3 9.4 - 12.3 FL    nRBC 0.00 0.0 - 0.2 K/uL    Differential Type AUTOMATED      Seg Neutrophils 78 43 - 78 %    Lymphocytes 13 13 - 44 %    Monocytes 9 4.0 - 12.0 %    Eosinophils % 0 (L) 0.5 - 7.8 %    Basophils 0 0.0 - 2.0 %    Immature Granulocytes 0 0.0 - 5.0 %    Segs Absolute 7.1 1.7 - 8.2 K/UL    Absolute Lymph # 1.2 0.5 - 4.6 K/UL    Absolute Mono # 0.8 0.1 - 1.3 K/UL    Absolute Eos # 0.0 0.0 - 0.8 K/UL    Basophils Absolute 0.0 0.0 - 0.2 K/UL    Absolute Immature Granulocyte 0.0 0.0 - 0.5 K/UL   Phosphorus    Collection Time: 10/25/22  5:52 AM   Result Value Ref Range    Phosphorus 5.1 (H) 2.3 - 3.7 MG/DL   Hemoglobin A1C    Collection Time: 10/25/22  5:52 AM   Result Value Ref Range    Hemoglobin A1C 9.1 (H) 4.8 - 5.6 %    eAG 214 mg/dL   TSH with Reflex    Collection Time: 10/25/22  5:52 AM   Result Value Ref Range    TSH w Free Thyroid if Abnormal 0.68 0.358 - 3.740 UIU/ML   POC Blood, Cord, Arterial    Collection Time: 10/25/22  6:57 AM   Result Value Ref Range    DEVICE Non rebreather      FIO2 100 %    pH, Arterial, POC 7.29 (L) 7.35 - 7.45      pCO2, Arterial, POC 72.3 (HH) 35 - 45 MMHG    pO2, Arterial,  (H) 75 - 100 MMHG    HCO3, Mixed 34.6 (H) 22 - 26 MMOL/L    SO2c, Arterial, POC 99.8 (H) 95 - 98 %    Base Excess 5.1 mmol/L    POC Jeremías's Test Positive      Site RIGHT RADIAL      Specimen type: ARTERIAL      Performed by: Venecia Moreno     Critical Value Read Back STONE    POCT Glucose    Collection Time: 10/25/22  7:58 AM   Result Value Ref Range    POC Glucose 248 (H) 65 - 100 mg/dL    Performed by: [employer] POC Blood, Cord, Arterial    Collection Time: 10/25/22  9:40 AM   Result Value Ref Range    DEVICE BIPAP MASK      pH, Arterial, POC 7.41 7.35 - 7.45      pCO2, Arterial, POC 54.1 (H) 35 - 45 MMHG    pO2, Arterial, POC 67 (L) 75 - 100 MMHG    HCO3, Mixed 34.2 (H) 22 - 26 MMOL/L    SO2c, Arterial, POC 92.6 (L) 95 - 98 %    Base Excess 7.8 mmol/L    POC TIDAL VOLUME 420 ml    POC PEEP 6 cmH2O    POC Pressure Support 10 cmH2O    POC Jeremías's Test Positive      Site RIGHT RADIAL      Specimen type: ARTERIAL      Performed by: Marianna     FIO2 7     POCT Glucose    Collection Time: 10/25/22 11:51 AM   Result Value Ref Range    POC Glucose 286 (H) 65 - 100 mg/dL    Performed by: Kimani    Culture, Respiratory    Collection Time: 10/25/22 12:41 PM    Specimen: Sputum Expectorated   Result Value Ref Range    Special Requests NO SPECIAL REQUESTS      Gram stain 15 to 35 NO WBC'S SEEN PER OIF     Gram stain 0 TO 2 EPITHELIAL CELLS SEEN PER OIF     Gram stain MANY GRAM POSITIVE COCCI      Gram stain MANY GRAM NEGATIVE RODS      Gram stain MODERATE GRAM POSITIVE RODS      Gram stain 2+ MUCUS PRESENT      Culture CULTURE IN PROGRESS,FURTHER UPDATES TO FOLLOW     POCT Glucose    Collection Time: 10/25/22  4:52 PM   Result Value Ref Range    POC Glucose 255 (H) 65 - 100 mg/dL    Performed by: Rasta    POCT Glucose    Collection Time: 10/25/22  8:55 PM   Result Value Ref Range    POC Glucose 334 (H) 65 - 100 mg/dL    Performed by: Toby    Comprehensive Metabolic Panel w/ Reflex to MG    Collection Time: 10/26/22  3:13 AM   Result Value Ref Range    Sodium 136 133 - 143 mmol/L    Potassium 4.5 3.5 - 5.1 mmol/L    Chloride 97 (L) 101 - 110 mmol/L    CO2 34 (H) 21 - 32 mmol/L    Anion Gap 5 2 - 11 mmol/L    Glucose 254 (H) 65 - 100 mg/dL    BUN 17 8 - 23 MG/DL    Creatinine 0.77 (L) 0.8 - 1.5 MG/DL    Est, Glom Filt Rate >60 >60 ml/min/1.73m2    Calcium 8.1 (L) 8.3 - 10.4 MG/DL    Total Bilirubin 0.4 0.2 - 1.1 MG/DL    ALT 27 12 - 65 U/L    AST 24 15 - 37 U/L    Alk Phosphatase 54 50 - 136 U/L    Total Protein 5.9 (L) 6.3 - 8.2 g/dL    Albumin 2.7 (L) 3.2 - 4.6 g/dL    Globulin 3.2 2.8 - 4.5 g/dL    Albumin/Globulin Ratio 0.8 0.4 - 1.6     CBC with Auto Differential    Collection Time: 10/26/22  3:13 AM   Result Value Ref Range    WBC 8.2 4.3 - 11.1 K/uL    RBC 4.55 4.23 - 5.6 M/uL    Hemoglobin 12.7 (L) 13.6 - 17.2 g/dL    Hematocrit 39.9 (L) 41.1 - 50.3 %    MCV 87.7 82.0 - 102.0 FL    MCH 27.9 26.1 - 32.9 PG    MCHC 31.8 31.4 - 35.0 g/dL    RDW 13.2 11.9 - 14.6 %    Platelets 895 645 - 012 K/uL    MPV 10.6 9.4 - 12.3 FL    nRBC 0.00 0.0 - 0.2 K/uL    Differential Type AUTOMATED      Seg Neutrophils 78 43 - 78 %    Lymphocytes 13 13 - 44 %    Monocytes 8 4.0 - 12.0 %    Eosinophils % 0 (L) 0.5 - 7.8 %    Basophils 0 0.0 - 2.0 %    Immature Granulocytes 0 0.0 - 5.0 %    Segs Absolute 6.4 1.7 - 8.2 K/UL    Absolute Lymph # 1.1 0.5 - 4.6 K/UL    Absolute Mono # 0.7 0.1 - 1.3 K/UL    Absolute Eos # 0.0 0.0 - 0.8 K/UL    Basophils Absolute 0.0 0.0 - 0.2 K/UL    Absolute Immature Granulocyte 0.0 0.0 - 0.5 K/UL   Phosphorus    Collection Time: 10/26/22  3:13 AM   Result Value Ref Range    Phosphorus 3.7 2.3 - 3.7 MG/DL   POCT Glucose    Collection Time: 10/26/22  7:17 AM   Result Value Ref Range    POC Glucose 227 (H) 65 - 100 mg/dL    Performed by: Joelne Ramos    POCT Glucose    Collection Time: 10/26/22 11:39 AM   Result Value Ref Range    POC Glucose 148 (H) 65 - 100 mg/dL    Performed by: Jolene Ramos I have personally reviewed imaging studies showing: Other Studies:  XR CHEST PORTABLE   Final Result   Nodular interstitial pattern. This could representfailure or   atypical pneumonia. XR CHEST (2 VW)   Final Result   No acute cardiopulmonary abnormality.           Current Meds:  Current Facility-Administered Medications   Medication Dose Route Frequency    levoFLOXacin (LEVAQUIN) 750 MG/150ML infusion 750 mg  750 mg IntraVENous Q24H    azithromycin (ZITHROMAX) 500 mg in sodium chloride 0.9 % 250 mL IVPB ([id number])  500 mg IntraVENous Q24H    methylPREDNISolone sodium (SOLU-MEDROL) injection 60 mg  60 mg IntraVENous Daily    metoprolol (LOPRESSOR) injection 5 mg  5 mg IntraVENous Q6H    oxyCODONE (ROXICODONE) immediate release tablet 20 mg  20 mg Oral Q4H PRN    oxyCODONE (ROXICODONE) immediate release tablet 10 mg  10 mg Oral Q4H PRN    insulin lispro (HUMALOG) injection vial 0-8 Units  0-8 Units SubCUTAneous TID WC    insulin lispro (HUMALOG) injection vial 0-4 Units  0-4 Units SubCUTAneous Nightly    amLODIPine (NORVASC) tablet 5 mg  5 mg Oral Daily    famotidine (PEPCID) tablet 40 mg  40 mg Oral Daily    insulin glargine (LANTUS) injection vial 56 Units  56 Units SubCUTAneous BID    lisinopril (PRINIVIL;ZESTRIL) tablet 20 mg  20 mg Oral Daily    pantoprazole (PROTONIX) tablet 40 mg  40 mg Oral BID    tamsulosin (FLOMAX) capsule 0.4 mg  0.4 mg Oral Daily    zolpidem (AMBIEN) tablet 10 mg  10 mg Oral Nightly PRN    hydroCHLOROthiazide (HYDRODIURIL) tablet 12.5 mg  12.5 mg Oral Daily    meloxicam (MOBIC) tablet 15 mg  15 mg Oral Daily    gabapentin (NEURONTIN) capsule 400 mg  400 mg Oral 4x Daily    atorvastatin (LIPITOR) tablet 20 mg  20 mg Oral Nightly    insulin lispro (HUMALOG) injection vial 25 Units  25 Units SubCUTAneous BID WC    sodium chloride flush 0.9 % injection 5-40 mL  5-40 mL IntraVENous 2 times per day    sodium chloride flush 0.9 % injection 5-40 mL  5-40 mL IntraVENous PRN    0.9 % sodium chloride infusion   IntraVENous PRN    enoxaparin Sodium (LOVENOX) injection 30 mg  30 mg SubCUTAneous BID    ondansetron (ZOFRAN-ODT) disintegrating tablet 4 mg  4 mg Oral Q8H PRN    Or    ondansetron (ZOFRAN) injection 4 mg  4 mg IntraVENous Q6H PRN    polyethylene glycol (GLYCOLAX) packet 17 g  17 g Oral Daily PRN    acetaminophen (TYLENOL) tablet 650 mg  650 mg Oral Q6H PRN Or    acetaminophen (TYLENOL) suppository 650 mg  650 mg Rectal Q6H PRN    glucose chewable tablet 16 g  4 tablet Oral PRN    dextrose bolus 10% 125 mL  125 mL IntraVENous PRN    Or    dextrose bolus 10% 250 mL  250 mL IntraVENous PRN    glucagon (rDNA) injection 1 mg  1 mg SubCUTAneous PRN    dextrose 10 % infusion   IntraVENous Continuous PRN    albuterol (PROVENTIL) nebulizer solution 2.5 mg  2.5 mg Nebulization 4x daily    guaiFENesin (ROBITUSSIN) 100 MG/5ML oral solution 200 mg  200 mg Oral Q4H PRN       Signed:  Yasmin Gibbs DO    Part of this note may have been written by using a voice dictation software. The note has been proof read but may still contain some grammatical/other typographical errors.

## 2022-10-26 NOTE — PROGRESS NOTES
ACUTE OCCUPATIONAL THERAPY GOALS:   (Developed with and agreed upon by patient and/or caregiver.)  1. Patient will perform grooming with supervision. 2. Patient will perform upper body dressing with supervision. 3. Patient will perform lower body dressing with supervision. 4. Patient will perform bathing with supervision. 5. Patient will perform toileting and toilet transfer with supervision. 6. Patient will perform ADL functional mobility and tranfers in room with supervision. 7. Patient/family to demonstrate knowledge of home safety and DME recommendations. Goals to be achieved in 7 days. OCCUPATIONAL THERAPY Initial Assessment and AM       OT Visit Days: 1  Acknowledge Orders  Time  OT Charge Capture  Rehab Caseload Tracker      Jud Don is a 74 y.o. male   PRIMARY DIAGNOSIS: Flu  Flu [J11.1]       Reason for Referral: Generalized Muscle Weakness (M62.81)  Other lack of cordination (R27.8)  Inpatient: Payor: [place] / Plan: Estil Host / Product Type: *No Product type* /     ASSESSMENT:     REHAB RECOMMENDATIONS:   Recommendation to date pending progress:  [address] [place] [address] if needed at d/c    Equipment:    To Be Determined     ASSESSMENT:  Mr. Gwyn Brown admitted with above diagnosis. Pt present with generalized weakness, decreased self care and functional mobility. Pt would benefit from skilled OT to increase independence. This am, pt donned gown with setup. Pt setup with breakfast tray. Pt ambulated in room with SBA. Pt returned to recliner with needs in reach. Continue therapy plan.       [address] [facility] [address] [address] AM-PAC 6 Clicks Daily Activity Inpatient Short Form:    AM-PAC Daily Activity Inpatient   How much help for putting on and taking off regular lower body clothing?: A Little  How much help for Bathing?: A Little  How much help for Toileting?: A Little  How much help for putting on and taking off regular upper body clothing?: None  How much help for taking care of personal grooming?: None  How much help for eating meals?: None  AM-PAC Inpatient Daily Activity Raw Score: 21  AM-PAC Inpatient ADL T-Scale Score : 44.27  ADL Inpatient CMS 0-100% Score: 32.79  ADL Inpatient CMS G-Code Modifier : CJ           SUBJECTIVE:     Mr. Alysha Flanagan states, \"I am just weak\"     Social/Functional Lives With: Friend(s)  Type of Home: House  Home Equipment: Oxygen (nebulizer, CPAP)  ADL Assistance: Independent    OBJECTIVE:     Brauloi Rachel / Mirella Payne / Lovely Osborn: Lowery Catheter and Telemetry     RESTRICTIONS/PRECAUTIONS:       PAIN: VITALS / O2:   Pre Treatment:   Pain Assessment: None - Denies Pain      Post Treatment: 0/10       Vitals          Oxygen  O2 Therapy: Oxygen  O2 Device: High flow nasal cannula  O2 Flow Rate (L/min):  (5L/min)         GROSS EVALUATION: INTACT IMPAIRED   (See Comments)   UE AROM [] []Decreased but functional   UE PROM [] []   Strength []  Decreased but functional     Posture / Balance []  decreased   Sensation [x]     Coordination []  Decreased but functional      Tone [x]       Edema []    Activity Tolerance []  decreased     Hand Dominance R [x] L []      COGNITION/  PERCEPTION: INTACT IMPAIRED   (See Comments)   Orientation [x]     Vision [x]     Hearing [x]     Cognition  [x]     Perception [x]       MOBILITY: I Mod I S SBA CGA Min Mod Max Total  NT x2 Comments:   Bed Mobility    Rolling [] [] [] [] [] [] [] [] [] [] []    Supine to Sit [] [] [] [x] [] [] [] [] [] [] []    Scooting [] [] [] [x] [] [] [] [] [] [] []    Sit to Supine [] [] [] [] [] [] [] [] [] [] []    Transfers    Sit to Stand [] [] [] [x] [] [] [] [] [] [] []    Bed to Chair [] [] [] [x] [] [] [] [] [] [] []    Stand to Sit [] [] [] [x] [] [] [] [] [] [] []    Tub/Shower [] [] [] [] [] [] [] [] [] [] []     Toilet [] [] [] [] [] [] [] [] [] [] []      [] [] [] [] [] [] [] [] [] [] []    I=Independent, Mod I=Modified Independent, S=Supervision/Setup, SBA=Standby Assistance, CGA=Contact Guard Assistance, Min=Minimal Assistance, Mod=Moderate Assistance, Max=Maximal Assistance, Total=Total Assistance, NT=Not Tested    ACTIVITIES OF DAILY LIVING: I Mod I S SBA CGA Min Mod Max Total NT Comments   BASIC ADLs:              Upper Body Bathing  [] [] [] [] [] [] [] [] [] [x]    Lower Body Bathing [] [] [] [] [] [] [] [] [] [x]    Toileting [] [] [] [] [] [] [] [] [] [x]    Upper Body Dressing [] [] [x] [] [] [] [] [] [] []    Lower Body Dressing [] [] [] [] [] [] [] [] [] []    Feeding [] [] [x] [] [] [] [] [] [] []    Grooming [] [] [] [] [] [] [] [] [] []    Personal Device Care [] [] [] [] [] [] [] [] [] []    Functional Mobility [] [] [] [x] [] [] [] [] [] []    I=Independent, Mod I=Modified Independent, S=Supervision/Setup, SBA=Standby Assistance, CGA=Contact Guard Assistance, Min=Minimal Assistance, Mod=Moderate Assistance, Max=Maximal Assistance, Total=Total Assistance, NT=Not Tested    PLAN:   FREQUENCY/DURATION   OT Plan of Care: 3 times/week for duration of hospital stay or until stated goals are met, whichever comes first.    PROBLEM LIST:   (Skilled intervention is medically necessary to address:)  Decreased ADL/Functional Activities  Decreased Activity Tolerance  Decreased Strength  Decreased Transfer Abilities   INTERVENTIONS PLANNED:  (Benefits and precautions of occupational therapy have been discussed with the patient.)  Self Care Training  Therapeutic Activity  Therapeutic Exercise/HEP  Neuromuscular Re-education  Manual Therapy  Education         TREATMENT:     EVALUATION: LOW COMPLEXITY: (Untimed Charge)    TREATMENT:   Self Care (15 minutes): Patient participated in upper body dressing and self feeding ADLs in supported sitting and unsupported sitting with minimal verbal cueing to increase independence. Patient also participated in functional mobility, functional transfer, and energy conservation training to increase independence.      TREATMENT GRID:  N/A    AFTER TREATMENT PRECAUTIONS: Bed/Chair Locked, Call light within reach, Chair, Needs within reach, and RN notified    INTERDISCIPLINARY COLLABORATION:  RN/ PCT, PT/ PTA, and OT/ CATALAN    EDUCATION:  Education Given To: Patient  Education Provided: Role of Therapy  Education Method: Demonstration  Barriers to Learning: None  Education Outcome: Verbalized understanding;Demonstrated understanding    TOTAL TREATMENT DURATION AND TIME:  Time In: 8214  Time Out: 1100  Minutes: [address] [place], [place]

## 2022-10-26 NOTE — CARE COORDINATION
Discharge planning was discussed with the attending physician. The patient has influenza and may require a home oxygen qualifier test prior to discharge. The patient has home oxygen but previously informed case management that he only wears it at night.

## 2022-10-26 NOTE — PROGRESS NOTES
Physician Progress Note      PATIENT:               Gladys Cheney  CSN #:                  [id number]  :                       1955  ADMIT DATE:       10/23/2022 8:45 PM  DISCH DATE:  Vearl Barthel  PROVIDER #:        Martín Downs DO          QUERY TEXT:    Pt admitted with Flu. Pt noted to have required supplemental O2. If possible,   please document in the progress notes and discharge summary if you are   evaluating and/or treating any of the following: The medical record reflects the following:  Risk Factors: Flu  Clinical Indicators:  H&P statees \"Upon arrival to [facility], has now been placed on 5lpm as he was   hypoxic on the 2lpm\"  10/26 PN states \"respiratory status improved after resuming external   ventilatory support-transferred to ICU after rapid response called with   patient poorly responsive with ABG revealing hypoventilation/hypercapnia\"  Treatment: O2, ICU  Options provided:  -- Acute respiratory failure with hypoxia  -- Acute respiratory failure with hypercapnia  -- Acute respiratory failure with hypoxia and hypercapnia  -- Chronic respiratory failure with hypoxia  -- Chronic respiratory failure with hypercapnia  -- Chronic respiratory failure with hypoxia and hypercapnia  -- Acute on chronic respiratory failure with hypoxia  -- Acute on chronic respiratory failure with hypercapnia  -- Acute on chronic respiratory failure with hypoxia and hypercapnia  -- Other - I will add my own diagnosis  -- Disagree - Not applicable / Not valid  -- Disagree - Clinically unable to determine / Unknown  -- Refer to Clinical Documentation Reviewer    PROVIDER RESPONSE TEXT:    This patient is in acute respiratory failure with hypercapnia.     Query created by: Abdiel Vera on 10/26/2022 3:23 PM      Electronically signed by:  Martín Downs DO 10/26/2022 3:30 PM

## 2022-10-26 NOTE — PROGRESS NOTES
ACUTE PHYSICAL THERAPY GOALS:   (Developed with and agreed upon by patient and/or caregiver. )    LTG:  (1.)Mr. Chris Fontaine will move from supine to sit and sit to supine  in bed with MODIFIED INDEPENDENCE within 7 treatment day(s). (2.)Mr. Chris Fontaine will transfer from bed to chair and chair to bed with MODIFIED INDEPENDENCE using the least restrictive device within 7 treatment day(s). (3.)Mr. Chris Fontaine will ambulate with MODIFIED INDEPENDENCE for 300 feet with the least restrictive device within 7 treatment day(s). ________________________________________________________________________________________________     PHYSICAL THERAPY Initial Assessment and AM  (Link to Caseload Tracking: PT Visit Days : 1  Acknowledge Orders  Time In/Out  PT Charge Capture  Rehab Caseload Tracker    Vi Heath is a 74 y.o. male   PRIMARY DIAGNOSIS: Flu  Flu [J11.1]       Reason for Referral: Generalized Muscle Weakness (M62.81)  Other abnormalities of gait and mobility (R26.89)  Inpatient: Payor: Lisa Reyes / Plan: Candido Gupta / Product Type: *No Product type* /     ASSESSMENT:     REHAB RECOMMENDATIONS:   Recommendation to date pending progress:  Setting:  No further skilled therapy after discharge from hospital    Equipment:    None     ASSESSMENT:  Mr. Chris Fontaine presents with limited functional endurance and strength with transfers and gait due to his admission for the flu. He is on 5 L oxygen at the time of evaluation but sats stayed in the 90's. He will benefit from PT while here to increase his functional safety and endurance but should be able to return home without any further therapy needs. He is sitting up in chair on contact and eager to walk. He walked 180 ft in the room with SBA and therapist managing multiple lines. He returned to the recliner and stayed sitting up following therapy. He did well and should progress nicely with therapy.      [facility] Basic Mobility Inpatient Short Form  [facility] Mobility Inpatient   How much difficulty turning over in bed?: A Little  How much difficulty sitting down on / standing up from a chair with arms?: A Little  How much difficulty moving from lying on back to sitting on side of bed?: A Little  How much help from another person moving to and from a bed to a chair?: A Little  How much help from another person needed to walk in hospital room?: A Little  How much help from another person for climbing 3-5 steps with a railing?: A Little  [facility] Inpatient Mobility Raw Score : 18  AM-PAC Inpatient T-Scale Score : 43.63  Mobility Inpatient CMS 0-100% Score: 46.58  Mobility Inpatient CMS G-Code Modifier : CK    SUBJECTIVE:   Mr. Khushboo Tyler states, \"it feels good to walk\"     Social/Functional Lives With: Friend(s)  Type of Home: House  Home Equipment: Oxygen (nebulizer, CPAP)  ADL Assistance: Independent    OBJECTIVE:     PAIN: Nico Orestes / O2: Melendez Corwin / Evan Menesese / Charmayne Big:   Pre Treatment:          Post Treatment: 0 Vitals        Oxygen  O2 Device: High flow nasal cannula  O2 Flow Rate (L/min): 5 L/min   Continuous Pulse Oximetry, Lowery Catheter, Telemetry , and ICU lines    RESTRICTIONS/PRECAUTIONS:  Restrictions/Precautions: Fall Risk                 GROSS EVALUATION: Intact Impaired (Comments):   AROM [x]     PROM []    Strength []   Decreased but WFL   Balance []   Decreased but WFL   Posture []    Sensation []  Bilateral LE neuropathy   Coordination [x]      Tone [x]     Edema []    Activity Tolerance []   Limited due to fatigue, shortness of breath and weakness    []      COGNITION/  PERCEPTION: Intact Impaired (Comments):   Orientation [x]     Vision [x]     Hearing [x]     Cognition  [x]       MOBILITY: I Mod I S SBA CGA Min Mod Max Total  NT x2 Comments:   Bed Mobility    Rolling [] [] [] [] [] [] [] [] [] [x] []    Supine to Sit [] [] [] [] [] [] [] [] [] [x] []    Scooting [] [] [] [] [] [] [] [] [] [x] []    Sit to Supine [] [] [] [] [] [] [] [] [] [x] []    Transfers    Sit to Stand [] [] [] [x] [] [] [] [] [] [] []    Bed to Chair [] [] [] [x] [] [] [] [] [] [] []    Stand to Sit [] [] [] [x] [] [] [] [] [] [] []     [] [] [] [] [] [] [] [] [] [] []    I=Independent, Mod I=Modified Independent, S=Supervision, SBA=Standby Assistance, CGA=Contact Guard Assistance,   Min=Minimal Assistance, Mod=Moderate Assistance, Max=Maximal Assistance, Total=Total Assistance, NT=Not Tested    GAIT: I Mod I S SBA CGA Min Mod Max Total  NT x2 Comments:   Level of Assistance [] [] [] [x] [] [] [] [] [] [] []    Distance 180 feet    DME None    Gait Quality Decreased april , Decreased step clearance, Decreased step length, and Path deviations     Weightbearing Status Restrictions/Precautions  Restrictions/Precautions: Fall Risk    Stairs      I=Independent, Mod I=Modified Independent, S=Supervision, SBA=Standby Assistance, CGA=Contact Guard Assistance,   Min=Minimal Assistance, Mod=Moderate Assistance, Max=Maximal Assistance, Total=Total Assistance, NT=Not Tested    PLAN:   FREQUENCY AND DURATION: Daily for duration of hospital stay or until stated goals are met, whichever comes first.    THERAPY PROGNOSIS: Excellent    PROBLEM LIST:   (Skilled intervention is medically necessary to address:)  Decreased Activity Tolerance  Decreased Balance  Decreased Gait Ability  Decreased Strength  Decreased Transfer Abilities INTERVENTIONS PLANNED:   (Benefits and precautions of physical therapy have been discussed with the patient.)  Therapeutic Activity  Therapeutic Exercise/HEP  Gait Training       TREATMENT:   EVALUATION: LOW COMPLEXITY: (Untimed Charge)    TREATMENT:   Therapeutic Activity (20  Minutes): Therapeutic activity included Scooting, Transfer Training, Ambulation on level ground, Sitting balance , and Standing balance to improve functional Activity tolerance, Balance, Coordination, Mobility, and Strength.     TREATMENT GRID:  N/A    AFTER TREATMENT PRECAUTIONS: Bed/Chair Locked, Call light within reach, Chair, Needs within reach, and RN notified    INTERDISCIPLINARY COLLABORATION:  RN/ PCT, PT/ PTA, OT/ CATALAN, and RN Case Manager/      EDUCATION: Education Given To: Patient  Education Provided: Role of Therapy;Plan of Care;Transfer Training;Energy Conservation; Fall Prevention Strategies  Education Method: Demonstration;Verbal  Education Outcome: Verbalized understanding;Demonstrated understanding    TIME IN/OUT:  Time In: 1040  Time Out: 80  Minutes: Manuelito Galindo, PT

## 2022-10-27 VITALS
HEART RATE: 89 BPM | TEMPERATURE: 98.8 F | WEIGHT: 298 LBS | HEIGHT: 69 IN | SYSTOLIC BLOOD PRESSURE: 117 MMHG | RESPIRATION RATE: 18 BRPM | DIASTOLIC BLOOD PRESSURE: 73 MMHG | OXYGEN SATURATION: 92 % | BODY MASS INDEX: 44.14 KG/M2

## 2022-10-27 LAB
ALBUMIN SERPL-MCNC: 2.6 G/DL (ref 3.2–4.6)
ALBUMIN/GLOB SERPL: 0.7 {RATIO} (ref 0.4–1.6)
ALP SERPL-CCNC: 53 U/L (ref 50–136)
ALT SERPL-CCNC: 29 U/L (ref 12–65)
ANION GAP SERPL CALC-SCNC: 4 MMOL/L (ref 2–11)
AST SERPL-CCNC: 19 U/L (ref 15–37)
BASOPHILS # BLD: 0 K/UL (ref 0–0.2)
BASOPHILS NFR BLD: 0 % (ref 0–2)
BILIRUB SERPL-MCNC: 0.4 MG/DL (ref 0.2–1.1)
BUN SERPL-MCNC: 23 MG/DL (ref 8–23)
CALCIUM SERPL-MCNC: 8.2 MG/DL (ref 8.3–10.4)
CHLORIDE SERPL-SCNC: 94 MMOL/L (ref 101–110)
CO2 SERPL-SCNC: 35 MMOL/L (ref 21–32)
CREAT SERPL-MCNC: 0.9 MG/DL (ref 0.8–1.5)
DIFFERENTIAL METHOD BLD: ABNORMAL
EOSINOPHIL # BLD: 0 K/UL (ref 0–0.8)
EOSINOPHIL NFR BLD: 0 % (ref 0.5–7.8)
ERYTHROCYTE [DISTWIDTH] IN BLOOD BY AUTOMATED COUNT: 13.2 % (ref 11.9–14.6)
FLUID CULTURE: ABNORMAL
GLOBULIN SER CALC-MCNC: 3.7 G/DL (ref 2.8–4.5)
GLUCOSE BLD STRIP.AUTO-MCNC: 157 MG/DL (ref 65–100)
GLUCOSE SERPL-MCNC: 278 MG/DL (ref 65–100)
HCT VFR BLD AUTO: 37.5 % (ref 41.1–50.3)
HGB BLD-MCNC: 12 G/DL (ref 13.6–17.2)
IMM GRANULOCYTES # BLD AUTO: 0 K/UL (ref 0–0.5)
IMM GRANULOCYTES NFR BLD AUTO: 1 % (ref 0–5)
LYMPHOCYTES # BLD: 1.1 K/UL (ref 0.5–4.6)
LYMPHOCYTES NFR BLD: 14 % (ref 13–44)
Lab: ABNORMAL
MAGNESIUM SERPL-MCNC: 2.3 MG/DL (ref 1.8–2.4)
MCH RBC QN AUTO: 27.7 PG (ref 26.1–32.9)
MCHC RBC AUTO-ENTMCNC: 32 G/DL (ref 31.4–35)
MCV RBC AUTO: 86.6 FL (ref 82–102)
MONOCYTES # BLD: 0.6 K/UL (ref 0.1–1.3)
MONOCYTES NFR BLD: 8 % (ref 4–12)
NEUTS SEG # BLD: 5.9 K/UL (ref 1.7–8.2)
NEUTS SEG NFR BLD: 78 % (ref 43–78)
NRBC # BLD: 0 K/UL (ref 0–0.2)
ORGANISM ID: ABNORMAL
PLATELET # BLD AUTO: 194 K/UL (ref 150–450)
PMV BLD AUTO: 10 FL (ref 9.4–12.3)
POTASSIUM SERPL-SCNC: 3.9 MMOL/L (ref 3.5–5.1)
PROT SERPL-MCNC: 6.3 G/DL (ref 6.3–8.2)
RBC # BLD AUTO: 4.33 M/UL (ref 4.23–5.6)
S PNEUM AG SPEC QL LA: POSITIVE
SERVICE CMNT-IMP: ABNORMAL
SODIUM SERPL-SCNC: 133 MMOL/L (ref 133–143)
SPECIMEN SOURCE: ABNORMAL
SPECIMEN: ABNORMAL
WBC # BLD AUTO: 7.6 K/UL (ref 4.3–11.1)

## 2022-10-27 PROCEDURE — 6360000002 HC RX W HCPCS: Performed by: FAMILY MEDICINE

## 2022-10-27 PROCEDURE — 6370000000 HC RX 637 (ALT 250 FOR IP): Performed by: INTERNAL MEDICINE

## 2022-10-27 PROCEDURE — 85025 COMPLETE CBC W/AUTO DIFF WBC: CPT

## 2022-10-27 PROCEDURE — 94640 AIRWAY INHALATION TREATMENT: CPT

## 2022-10-27 PROCEDURE — 2700000000 HC OXYGEN THERAPY PER DAY

## 2022-10-27 PROCEDURE — 83735 ASSAY OF MAGNESIUM: CPT

## 2022-10-27 PROCEDURE — 94761 N-INVAS EAR/PLS OXIMETRY MLT: CPT

## 2022-10-27 PROCEDURE — 51702 INSERT TEMP BLADDER CATH: CPT

## 2022-10-27 PROCEDURE — 6370000000 HC RX 637 (ALT 250 FOR IP): Performed by: FAMILY MEDICINE

## 2022-10-27 PROCEDURE — 80053 COMPREHEN METABOLIC PANEL: CPT

## 2022-10-27 PROCEDURE — 36415 COLL VENOUS BLD VENIPUNCTURE: CPT

## 2022-10-27 PROCEDURE — 82962 GLUCOSE BLOOD TEST: CPT

## 2022-10-27 PROCEDURE — 2580000003 HC RX 258: Performed by: FAMILY MEDICINE

## 2022-10-27 PROCEDURE — 97530 THERAPEUTIC ACTIVITIES: CPT

## 2022-10-27 RX ORDER — LEVOFLOXACIN 750 MG/1
750 TABLET ORAL DAILY
Qty: 4 TABLET | Refills: 0 | Status: SHIPPED | OUTPATIENT
Start: 2022-10-27 | End: 2022-10-31

## 2022-10-27 RX ORDER — PREDNISONE 10 MG/1
TABLET ORAL
Qty: 30 TABLET | Refills: 0 | Status: SHIPPED | OUTPATIENT
Start: 2022-10-27 | End: 2022-11-10

## 2022-10-27 RX ORDER — PREDNISONE 20 MG/1
40 TABLET ORAL DAILY
Status: DISCONTINUED | OUTPATIENT
Start: 2022-10-27 | End: 2022-10-27 | Stop reason: HOSPADM

## 2022-10-27 RX ADMIN — TAMSULOSIN HYDROCHLORIDE 0.4 MG: 0.4 CAPSULE ORAL at 09:03

## 2022-10-27 RX ADMIN — FAMOTIDINE 40 MG: 20 TABLET, FILM COATED ORAL at 09:03

## 2022-10-27 RX ADMIN — INSULIN LISPRO 25 UNITS: 100 INJECTION, SOLUTION INTRAVENOUS; SUBCUTANEOUS at 09:04

## 2022-10-27 RX ADMIN — MELOXICAM 15 MG: 7.5 TABLET ORAL at 09:02

## 2022-10-27 RX ADMIN — AMLODIPINE BESYLATE 5 MG: 5 TABLET ORAL at 09:02

## 2022-10-27 RX ADMIN — INSULIN GLARGINE 56 UNITS: 100 INJECTION, SOLUTION SUBCUTANEOUS at 09:03

## 2022-10-27 RX ADMIN — GABAPENTIN 400 MG: 400 CAPSULE ORAL at 09:03

## 2022-10-27 RX ADMIN — ALBUTEROL SULFATE 2.5 MG: 2.5 SOLUTION RESPIRATORY (INHALATION) at 08:23

## 2022-10-27 RX ADMIN — PANTOPRAZOLE SODIUM 40 MG: 40 TABLET, DELAYED RELEASE ORAL at 09:03

## 2022-10-27 RX ADMIN — LISINOPRIL 20 MG: 20 TABLET ORAL at 09:02

## 2022-10-27 RX ADMIN — OXYCODONE HYDROCHLORIDE 20 MG: 15 TABLET ORAL at 07:48

## 2022-10-27 RX ADMIN — PREDNISONE 40 MG: 20 TABLET ORAL at 09:03

## 2022-10-27 RX ADMIN — ENOXAPARIN SODIUM 30 MG: 100 INJECTION SUBCUTANEOUS at 09:03

## 2022-10-27 RX ADMIN — SODIUM CHLORIDE, PRESERVATIVE FREE 10 ML: 5 INJECTION INTRAVENOUS at 09:05

## 2022-10-27 ASSESSMENT — PAIN SCALES - GENERAL
PAINLEVEL_OUTOF10: 6
PAINLEVEL_OUTOF10: 0
PAINLEVEL_OUTOF10: 5
PAINLEVEL_OUTOF10: 9

## 2022-10-27 ASSESSMENT — PAIN DESCRIPTION - DESCRIPTORS: DESCRIPTORS: ACHING;DISCOMFORT

## 2022-10-27 ASSESSMENT — PAIN DESCRIPTION - LOCATION: LOCATION: BACK

## 2022-10-27 NOTE — CARE COORDINATION
10/27/22 1130   Service Assessment   Patient Orientation Alert and Oriented   Cognition Alert   History Provided By Patient   Primary Caregiver Self   Support Systems Children;Friends/Neighbors   PCP Verified by CM Yes   Prior Functional Level Independent in ADLs/IADLs   Current Functional Level Independent in ADLs/IADLs   Can patient return to prior living arrangement Yes   Ability to make needs known: Good   Family able to assist with home care needs: Yes   Financial Resources [employer] Other (Comment)  (none)   Social/Functional History   Lives With Friend(s)   Type of Home House   Home Equipment Oxygen  (nebulizer, CPAP)   ADL Assistance Independent   Discharge Planning   Type of [address] [facility] Prior To Admission [address] [facility]; Oxygen Therapy  (Trilogy and home oxygen)   Current DME Prior to Arrival Oxygen Therapy (Comment)   Potential Assistance Needed N/A   DME Ordered? No   Potential Assistance Purchasing Medications No   Type of Home Care Services None   Patient expects to be discharged to: [place] [facility]     The patient is discharging home. He has home oxygen. A portable oxygen tank will be delivered to the hospital prior to discharge.

## 2022-10-27 NOTE — PROGRESS NOTES
ACUTE PHYSICAL THERAPY GOALS:   (Developed with and agreed upon by patient and/or caregiver. )    LTG:  (1.)Mr. Lynetta Sicard will move from supine to sit and sit to supine  in bed with MODIFIED INDEPENDENCE within 7 treatment day(s). (2.)Mr. Lynetta Sicard will transfer from bed to chair and chair to bed with MODIFIED INDEPENDENCE using the least restrictive device within 7 treatment day(s). -GOAL MET 10/27/22    (3.)Mr. Lynetta Sicard will ambulate with MODIFIED INDEPENDENCE for 300 feet with the least restrictive device within 7 treatment day(s). -GOAL MET 10/27/22    ________________________________________________________________________________________________     PHYSICAL THERAPY Daily Note and AM  (Link to Caseload Tracking: PT Visit Days : 2  Acknowledge Orders  Time In/Out  PT Charge Capture  Rehab Caseload Tracker    Sasha Agee is a 74 y.o. male   PRIMARY DIAGNOSIS: Flu  Flu [J11.1]       Reason for Referral: Generalized Muscle Weakness (M62.81)  Other abnormalities of gait and mobility (R26.89)  Inpatient: Payor: Lesley Hare / Plan: Olivier Mares / Product Type: *No Product type* /     ASSESSMENT:     REHAB RECOMMENDATIONS:   Recommendation to date pending progress:  Setting:  No further skilled therapy after discharge from hospital    Equipment:    None     ASSESSMENT:  Mr. Lynetta Sicard presents with limited functional endurance and strength with transfers and gait due to his admission for the flu. He is on 5 L oxygen at the time of evaluation but sats stayed in the 90's. He will benefit from PT while here to increase his functional safety and endurance but should be able to return home without any further therapy needs. He is sitting up in chair on contact and eager to walk. He walked 180 ft in the room with SBA and therapist managing multiple lines. He returned to the recliner and stayed sitting up following therapy. He did well and should progress nicely with therapy.   10/27 sitting in the chair upon arrival.  Work on sit>stand with Mod I.  Ambulated 150 ft down the garcia without AD and no LOB with 2 L 02. Rested then ambulated another 150 ft using 2 L 02 no AD. Return to chair and performs B LE exercises with good technique. Remain in the chair with needs in reach and instructed to call for assist, before getting up. All question answer and ready for D/C.      [address] [facility] [address] [address] AM-PAC 6 Clicks Basic Mobility Inpatient Short Form  AM-PAC Mobility Inpatient   How much difficulty turning over in bed?: A Little  How much difficulty sitting down on / standing up from a chair with arms?: A Little  How much difficulty moving from lying on back to sitting on side of bed?: A Little  How much help from another person moving to and from a bed to a chair?: A Little  How much help from another person needed to walk in hospital room?: A Little  How much help from another person for climbing 3-5 steps with a railing?: A Little  [facility] Inpatient Mobility Raw Score : 18  AM-PAC Inpatient T-Scale Score : 43.63  Mobility Inpatient CMS 0-100% Score: 46.58  Mobility Inpatient CMS G-Code Modifier : CK    SUBJECTIVE:   Mr. Nette Cole states, \"I am ready to walk\"    Social/Functional Lives With: Friend(s)  Type of Home: House  Home Equipment: Oxygen (nebulizer, CPAP)  ADL Assistance: Independent    OBJECTIVE:     PAIN: E[place] / O2: [employer] / Dollene Reasons / Onalee Lauth:   Pre Treatment:    0/10      Post Treatment: 0 Vitals        Oxygen      Continuous Pulse Oximetry, Lowery Catheter, Telemetry , and ICU lines    RESTRICTIONS/PRECAUTIONS:  Restrictions/Precautions: Fall Risk                 GROSS EVALUATION: Intact Impaired (Comments):   AROM [x]     PROM []    Strength []   Decreased but WFL   Balance []   Decreased but WFL   Posture []    Sensation []  Bilateral LE neuropathy   Coordination [x]      Tone [x]     Edema []    Activity Tolerance []   Limited due to fatigue, shortness of breath and weakness    [] COGNITION/  PERCEPTION: Intact Impaired (Comments):   Orientation [x]     Vision [x]     Hearing [x]     Cognition  [x]       MOBILITY: I Mod I S SBA CGA Min Mod Max Total  NT x2 Comments:   Bed Mobility    Rolling [] [] [] [] [] [] [] [] [] [x] []    Supine to Sit [] [] [] [] [] [] [] [] [] [x] []    Scooting [] [] [] [] [] [] [] [] [] [x] []    Sit to Supine [] [] [] [] [] [] [] [] [] [x] []    Transfers    Sit to Stand [] [x] [] [] [] [] [] [] [] [] []    Bed to Chair [] [x] [] [] [] [] [] [] [] [] []    Stand to Sit [] [x] [] [] [] [] [] [] [] [] []     [] [] [] [] [] [] [] [] [] [] []    I=Independent, Mod I=Modified Independent, S=Supervision, SBA=Standby Assistance, CGA=Contact Guard Assistance,   Min=Minimal Assistance, Mod=Moderate Assistance, Max=Maximal Assistance, Total=Total Assistance, NT=Not Tested    GAIT: I Mod I S SBA CGA Min Mod Max Total  NT x2 Comments:   Level of Assistance [] [] [] [x] [] [] [] [] [] [] []    Distance 150 (+ 150) feet    DME None    Gait Quality Decreased april , Decreased step clearance, Decreased step length, and Path deviations     Weightbearing Status      Stairs      I=Independent, Mod I=Modified Independent, S=Supervision, SBA=Standby Assistance, CGA=Contact Guard Assistance,   Min=Minimal Assistance, Mod=Moderate Assistance, Max=Maximal Assistance, Total=Total Assistance, NT=Not Tested    PLAN:   FREQUENCY AND DURATION: Daily for duration of hospital stay or until stated goals are met, whichever comes first.    THERAPY PROGNOSIS: Excellent    PROBLEM LIST:   (Skilled intervention is medically necessary to address:)  Decreased Activity Tolerance  Decreased Balance  Decreased Gait Ability  Decreased Strength  Decreased Transfer Abilities INTERVENTIONS PLANNED:   (Benefits and precautions of physical therapy have been discussed with the patient.)  Therapeutic Activity  Therapeutic Exercise/HEP  Gait Training       TREATMENT:   EVALUATION: LOW COMPLEXITY: (Untimed Charge)    TREATMENT:   Therapeutic Activity (38 Minutes): Therapeutic activity included Scooting, Transfer Training, Ambulation on level ground, Sitting balance , and Standing balance to improve functional Activity tolerance, Balance, Coordination, Mobility, and Strength.     TREATMENT GRID:  B in chair Date:  10/27 Date:   Date:     Activity/Exercise Parameters Parameters Parameters   Ankle pumps 12     Marching in place 12     Hip abd/ad 12     LAQ 12                                 AFTER TREATMENT PRECAUTIONS: Bed/Chair Locked, Call light within reach, Chair, Needs within reach, and RN notified    INTERDISCIPLINARY COLLABORATION:  RN/ PCT, PT/ PTA, OT/ CATALAN, and RN Case Manager/      EDUCATION: Education Given To: Patient  Education Provided: Role of Therapy  Education Method: Demonstration;Verbal    TIME IN/OUT:  [address], PTA

## 2022-10-27 NOTE — PROGRESS NOTES
Discharge instructions given to patient. Rxs given. . reviewed medication list. Patient has follow up appointments. Sent home with home oxygen. Will d/c when ride arrives. All personal belongings sent with patient.

## 2022-10-27 NOTE — PROGRESS NOTES
10/27/22 0834   Oxygen Therapy/Pulse Ox   O2 Device High flow nasal cannula   O2 Flow Rate (L/min) 3 L/min   Heart Rate 93   SpO2 92 %       Patient's sat while resting was 93% on 3 L of oxygen before ambulation. While ambulating patient required 3 L of oxygen to maintain sat above protocol. Patient denied SOB, and no distress noted while ambulating. Medicare has specific guidelines for documentation of ambulatory oxygen saturation testing, therefore all test results must be documented in the patient's EMR as outlined below. Room air: SpO2 with O2 and liter flow   Resting SpO2  86 %  93% on 3 L   Ambulating SpO2  81 % RESULTS: 91% on 3L  While ambulating patient required 3 L of oxygen to maintain sat above protocol. Respiratory Care Services     Policy Number: [id number]    Title: Home Oxygen Assessment Protocol    Effective Date:  4/9/14    Reviewed Date: 5/28/2018, 11/2020    Revised: 29/2726       Policy: The Respiratory Care Practitioner (RCP) shall assess all patients who meet Medicare's Home Oxygen Diagnostic Requirements. If a patient is unable to wean to room air within 48 hours of discharge, the RCP shall order a 3 step ambulatory oxygen saturation (SpO2) per protocol and instruct the patient in completing the test. The RCP shall document results of the test as outlined in this protocol. Purpose: Oxygen is used for short-term hospitalized patients and long-term therapy in patients with chronic lung disease and recurring congestive heart failure. Centers for [employer] (CMS) has very specific guidelines and indications for its short-term use in the acute care setting and the long-term use in the home or other facility. This protocol will address the rationale and requirements for long-term oxygen therapy. Long-term oxygen therapy is delivered to reduce long-term complications of chronic hypoxemia, particularly cor pulmonale.  Oxygen supplementation in patients with chronic hypoxemia has been shown to improve survival, pulmonary hemodynamics, exercise capacity and neuropsychological performance. 1    Responsibility: Director of [address] [place] [address] and Respiratory Care Practitioners. Home Oxygen Diagnostic Requirements:   Covered health conditions:    Severe primary lung disease   Chronic obstructive pulmonary disease  Diffuse interstitial lung disease  Cystic fibrosis  Bronchiectasis  Pulmonary neoplasm, primary or metastatic  Chronic bronchitis  Emphysema  Hypoxia-related symptoms or conditions that may improve with oxygen therapy such as  Pulmonary hypertension  Recurring congestive heart failure due to chronic cor pulmonale  Erythocytosis/erythrocythemia  Qualifying testing requirements  Testing must be performed within two days prior to discharge. Test results must be documented in patient's medical record in approved format. Testing can be done under three conditions  A test during rest.   Oxygen is considered medically necessary if SpO2 is less than or equal to 88%. If SpO2 is greater than 88%, proceed to step 2. A test taken on room air during exercise  If patient meets qualifying threshold of SpO2 less than or equal to 88% while exercising, all three of the required tests below must be performed within same testing session and be recorded in the patient's medical record. A test taken during rest while patient breathes room air. A test during exercise while patient breathes room air. A test taken during exercise with oxygen applied. (to demonstrate improvement of hypoxia). A test taken during sleep. If patient is tested during sleep, test must have at least two hours of recorded time. Test must indicate arterial oxygen saturation of 88% or less for at least 5 minutes of   testing period. A patient tested during sleep will not qualify for portable oxygen.   A physician order will be required for an overnight oximetry test.  Regardless of test condition, the following values apply to all:  Group I: (Requires annual recertification)  Patient is on room air while at rest (awake) when tested. Arterial oxygen saturation (SaO2) is at or below 88% or   PaO2 is at or below <55mm Hg   Group II: (Recertification and retesting are required 61-90 days after initial start)  Patient is on room air at rest while awake when tested. PaO2 = 56-59 mmHg or  SaO2 89% acceptable only with secondary diagnosis of  Edema suggesting congestive heart failure  Pulmonary hypertension/cor pulmonale with P wave > 3mm in lead II, III, or AVF  Erythrocythemia with Hct >56%  Group III:   If PaO2 > 60 mmHg or   SaO2 >90% there is a presumption of noncoverage. If liter flow is greater than 4LPM, patient must meet Group 1 or Group II criteria while patient is receiving oxygen at a rate of 4LPM or higher  All patients must be tested in a stable state including those discharged from the hospital.  All coexisting diseases or conditions that can cause hypoxia must be treated and patient must be tested in a chronic stable state before oxygen therapy is qualified. Procedure for SpO2 testing at rest.  Obtain a 30 second room air SpO2 check while patient is on room air, rested and awake. If SpO2 is 88% increase O2 by 1 L to maintain SpO2 of 90%. Document results in patient's EMR. Procedure for ambulatory SpO2 testing  For patient who meets the Covered Health Conditions and is unable to wean to room air within 48 hours of discharge, complete an ambulatory SaO2. Preparation  Write an order for 3 step ambulatory oxygen saturation test per protocol. Obtain pulse oximeter and insure that it is working accurately. Perform hand hygiene per hospital policy utilizing Standard Precautions for all patients and following transmission-based isolation as indicated per hospital policy. Identify patient, verify name and birth date via ID bracelet. Introduce self and identify department.   Explain procedure to patient and family and confirm understanding. Assessment  Assure that patient is on room air prior to test.  Fingernail polish if worn by patient must be removed before testing. Ask patient to sit in an upright position. Resting SaO2 assessment   Perform a 30 second resting room air SaO2. If SaO2 is 88% or less Medicare considers oxygen is medically necessary   Document findings in patient EMR. If SaO2 is greater than 88%, proceed to the next step. Ambulatory Room Air SaO2 check  Ask patient to walk for 5 minutes on room air or as long as tolerated. If patients ordinarily use a cane, walker or rollator, they should be used during test.  Instruct patient to walk at a comfortable pace and to breathe naturally during test.  Monitor and record patient's heart rate, SaO2 and exercise endurance. If SaO2 is 88% or less post exercise, an ambulatory test on oxygen must be performed. Ambulatory SaO2 on Oxygen  [facility] shall place the patient on oxygen to achieve a SaO2 of 90%. Instruct patient to walk for 5 minutes or as long as tolerated. If patients ordinarily use a cane, walker or rollator, they should be used during test  Monitor and record patient's heart rate, SaO2 and exercise endurance. If Sa02 decreases to the range of 80% to 84% the flow shall be increased by 2LPM.   If SaO2 decreases to the range of 85%-89% increased oxygen by 1 LPM.  If SaO2 is less than 80%, the patient is not appropriate for ambulation. Documentation   Medicare has specific guidelines for documentation of ambulatory oxygen saturation testing, therefore all test results must be documented in the patient's EMR as outlined below. Room air: SpO2 with O2 and liter flow   Resting SpO2  86 %  93% on 3 L   Ambulating SpO2  81 % RESULTS: 91% on 3L  While ambulating patient required 3 L of oxygen to maintain sat above protocol. Reference:       Nory Nick [address] for [facility] & Medicaid Services. Home Oxygen Therapy. Medicare        Part B- Oxygen Coverage

## 2022-10-27 NOTE — CARE COORDINATION
0800: Discharge planning was discussed with the Critical Care Interdisciplinary Team. The patient is a potential discharge home today and would benefit from an oxygen qualifier test prior to discharge. 0815: Discharge planning was discussed with the Physical Therapist. Per the therapist, the patient does not have any home health PT/OT needs at discharge. 7420: EUSEBIO MCINTYRE spoke with Papa Saldaña at [facility]. According to Papa Saldaña, they have orders for the patient to wear 4. 5LO2 NC continuously. She confirmed he also has a home trilogy through their company. 0930: RN CM met with the patient at the bedside and discussed discharge planning. He confirmed he is in agreement to discharge today. The Important Message from Medicare letter was delivered and explained to the patient. He verbalized understanding and agreement with the information and signed the letter. He was given a copy and the original was placed in the patient's chart. He reports he does not have any portable oxygen tanks and wants to discharge home without them. RN CM discussed the importance of him wearing his oxygen as prescribed. He stated he was not aware he was supposed to wear it continuously. RN CM contacted [employer] and informed them that the patient needs portable oxygen tanks. [employer] requests for new oxygen orders and qualifiers to be faxed to [phone].     1030: Discharge planning was discussed with the attending MD. The oxygen orders to resume his current orders was confirmed with read back and sent to [employer]. 1129: EUSEBIO MCINTYRE spoke with Kunal Banerjee, Manager with [employer] and [address] [place] [address]. She confirmed she does not need new oxygen orders and will have the portable oxygen tanks delivered to the hospital today.

## 2022-10-27 NOTE — PLAN OF CARE
Problem: Discharge Planning  Goal: Discharge to home or other facility with appropriate resources  Outcome: Progressing  Flowsheets (Taken 10/26/2022 0751 by Jud Rodas RN)  Discharge to home or other facility with appropriate resources: Identify barriers to discharge with patient and caregiver     Problem: Pain  Goal: Verbalizes/displays adequate comfort level or baseline comfort level  Outcome: Progressing  Flowsheets  Taken 10/26/2022 1925 by Aleks Hubbard RN  Verbalizes/displays adequate comfort level or baseline comfort level:   Encourage patient to monitor pain and request assistance   Assess pain using appropriate pain scale  Taken 10/26/2022 0701 by Jud Rodas RN  Verbalizes/displays adequate comfort level or baseline comfort level:   Encourage patient to monitor pain and request assistance   Assess pain using appropriate pain scale   Administer analgesics based on type and severity of pain and evaluate response     Problem: Safety - Adult  Goal: Free from fall injury  Outcome: Progressing  Flowsheets  Taken 10/26/2022 1925 by Aleks Hubbard RN  Free From Fall Injury: Instruct family/caregiver on patient safety  Taken 10/26/2022 1538 by Jud Rodas RN  Free From Fall Injury: Instruct family/caregiver on patient safety     Problem: Respiratory - Adult  Goal: Achieves optimal ventilation and oxygenation  Outcome: Progressing  Flowsheets  Taken 10/26/2022 1925 by Aleks Hubbard RN  Achieves optimal ventilation and oxygenation:   Assess for changes in respiratory status   Assess for changes in mentation and behavior   Position to facilitate oxygenation and minimize respiratory effort   Oxygen supplementation based on oxygen saturation or arterial blood gases  Taken 10/26/2022 0751 by Jud Rodas RN  Achieves optimal ventilation and oxygenation:   Assess for changes in respiratory status   Position to facilitate oxygenation and minimize respiratory effort   Oxygen supplementation based on oxygen saturation or arterial blood gases   Respiratory therapy support as indicated     Problem: Cardiovascular - Adult  Goal: Absence of cardiac dysrhythmias or at baseline  Outcome: Progressing  Flowsheets  Taken 10/26/2022 1925 by Lori Patton RN  Absence of cardiac dysrhythmias or at baseline: Monitor cardiac rate and rhythm  Taken 10/26/2022 0751 by Maximiliano Mayfield RN  Absence of cardiac dysrhythmias or at baseline: Monitor cardiac rate and rhythm     Problem: Gastrointestinal - Adult  Goal: Maintains adequate nutritional intake  Outcome: Progressing  Flowsheets  Taken 10/26/2022 1925 by Lori Patton RN  Maintains adequate nutritional intake: Monitor percentage of each meal consumed  Taken 10/26/2022 0751 by Maximiliano Mayifeld RN  Maintains adequate nutritional intake:   Monitor percentage of each meal consumed   Monitor intake and output, weight and lab values     Problem: Genitourinary - Adult  Goal: Absence of urinary retention  Outcome: Progressing  Flowsheets  Taken 10/26/2022 1925 by Lori Patton RN  Absence of urinary retention:   Assess patients ability to void and empty bladder   Monitor intake/output and perform bladder scan as needed  Taken 10/26/2022 0751 by Maximiliano Mayfield RN  Absence of urinary retention: Assess patients ability to void and empty bladder  Goal: Urinary catheter remains patent  Outcome: Progressing  Flowsheets  Taken 10/26/2022 1925 by Lori Patton RN  Urinary catheter remains patent: Assess patency of urinary catheter  Taken 10/26/2022 0751 by Maximiliano Mayfield RN  Urinary catheter remains patent: Assess patency of urinary catheter     Problem: Metabolic/Fluid and Electrolytes - Adult  Goal: Hemodynamic stability and optimal renal function maintained  Outcome: Progressing  Flowsheets (Taken 10/26/2022 0751 by Maximiliano Mayfield RN)  Hemodynamic stability and optimal renal function maintained:   Monitor labs and assess for signs and symptoms of volume excess or deficit   Monitor intake, output and patient weight  Goal: Glucose maintained within prescribed range  Outcome: Progressing  Flowsheets (Taken 10/26/2022 0751 by Louie Parks RN)  Glucose maintained within prescribed range: Monitor blood glucose as ordered     Problem: Hematologic - Adult  Goal: Maintains hematologic stability  Outcome: Progressing  Flowsheets (Taken 10/26/2022 0751 by Louie Parks RN)  Maintains hematologic stability: Assess for signs and symptoms of bleeding or hemorrhage     Problem: ABCDS Injury Assessment  Goal: Absence of physical injury  Outcome: Progressing  Flowsheets (Taken 10/26/2022 1925)  Absence of Physical Injury: Implement safety measures based on patient assessment     Problem: Safety - Medical Restraint  Goal: Remains free of injury from restraints (Restraint for Interference with Medical Device)  Description: INTERVENTIONS:  1. Determine that other, less restrictive measures have been tried or would not be effective before applying the restraint  2. Evaluate the patient's condition at the time of restraint application  3. Inform patient/family regarding the reason for restraint  4.  Q2H: Monitor safety, psychosocial status, comfort, nutrition and hydration  Outcome: Completed

## 2022-10-27 NOTE — PROGRESS NOTES
Patient is on isolation for flu.  reviewed chart and discussed with RN for spiritual needs.  provided prayer from outside room and is available for follow up.   Signed by  Francisca Castellano M.Div.

## 2022-10-27 NOTE — PLAN OF CARE
Problem: Discharge Planning  Goal: Discharge to home or other facility with appropriate resources  Outcome: Adequate for Discharge     Problem: Pain  Goal: Verbalizes/displays adequate comfort level or baseline comfort level  Outcome: Adequate for Discharge     Problem: Safety - Adult  Goal: Free from fall injury  Outcome: Adequate for Discharge     Problem: Respiratory - Adult  Goal: Achieves optimal ventilation and oxygenation  Outcome: Adequate for Discharge     Problem: Cardiovascular - Adult  Goal: Absence of cardiac dysrhythmias or at baseline  Outcome: Adequate for Discharge     Problem: Gastrointestinal - Adult  Goal: Maintains adequate nutritional intake  Outcome: Adequate for Discharge     Problem: Genitourinary - Adult  Goal: Absence of urinary retention  Outcome: Adequate for Discharge  Goal: Urinary catheter remains patent  Outcome: Adequate for Discharge     Problem: Metabolic/Fluid and Electrolytes - Adult  Goal: Hemodynamic stability and optimal renal function maintained  Outcome: Adequate for Discharge  Goal: Glucose maintained within prescribed range  Outcome: Adequate for Discharge     Problem: Hematologic - Adult  Goal: Maintains hematologic stability  Outcome: Adequate for Discharge     Problem: ABCDS Injury Assessment  Goal: Absence of physical injury  Outcome: Adequate for Discharge

## 2022-10-27 NOTE — DISCHARGE SUMMARY
Hospitalist Discharge Summary   Admit Date:  10/23/2022  8:45 PM   DC Note date: 10/27/2022  Name:  Rashid Hamilton   Age:  74 y.o. Sex:  male  :  1955   MRN:  [id number]   Room:  [facility]  PCP:  Kane Najera MD    Presenting Complaint: No chief complaint on file. Initial Admission Diagnosis: Flu [J11.1]     Problem List for this Hospitalization (present on admission):    Principal Problem:    Flu  Active Problems:    Type 2 diabetes mellitus with hyperglycemia    Essential hypertension, benign    Spinal stenosis, lumbar region, with neurogenic claudication    Chest pain    High cholesterol    COPD (chronic obstructive pulmonary disease) (HCC)    Type 2 diabetes mellitus with hyperglycemia, with long-term current use of insulin (HCC)    Class 3 severe obesity due to excess calories with serious comorbidity and body mass index (BMI) of 45.0 to 49.9 in [facility])    Chronic low back pain    MISTI treated with BiPAP  Resolved Problems:    * No resolved hospital problems. [facility] Course:  24-year-old male with oxygen dependent COPD and obstructive sleep apnea trilogy dependent presented with myalgias dyspnea found to have pneumonia and influenza. Treated with antibiotics including quinolone and atypical coverage with azithromycin-will be discharged with quinolone to complete 7-day course. Had respiratory failure with hypercarbia which required transfer to ICU for external ventilatory support. Fluid positive but did not receive Tamiflu due to symptoms started greater than 3 days prior to presentation. He has weaning course of steroids and will adjust his insulin at home appropriately he has both long-acting and prandial coverage and is able to demonstrate ability by his verbal communication to titrate insulin. His pulmonary status has improved significantly and he is stable for discharge home to continue convalescence.   He will follow-up with primary care within 1 week and pulmonology as directed. Disposition: Stable for discharge home  Diet: ADULT DIET; Regular; 4 carb choices (60 gm/meal); Low Fat/Low Chol/High Fiber/2 gm Na  Code Status: Full Code    Follow Ups:   Follow-up Information     Jayda Ruiz MD Follow up in 1 week(s). Specialty: Family Medicine  Contact information:  Jenna Webster MD .    Specialty: Family Medicine  Contact information:  [place] [id number]  [phone]                       Time spent in patient discharge and coordination 42 minutes. Follow up labs/diagnostics (ultimately defer to outpatient provider): As per primary care    Plan was discussed with patient and staff. All questions answered. Patient was stable at time of discharge. Instructions given to call a physician or return if any concerns. Current Discharge Medication List        START taking these medications    Details   predniSONE (DELTASONE) 10 MG tablet Take 4 daily for 3 days then 3 daily for 3 days then 2 daily for 3 days and then 1 daily  Qty: 30 tablet, Refills: 0      levoFLOXacin (LEVAQUIN) 750 MG tablet Take 1 tablet by mouth daily for 4 days  Qty: 4 tablet, Refills: 0           CONTINUE these medications which have NOT CHANGED    Details   Cholecalciferol (VITAMIN D3) 50 MCG (2000 UT) CAPS Take 1 capsule by mouth daily      VITAMIN B COMPLEX-C PO Take 1 capsule by mouth daily      Cinnamon 500 MG CAPS Take 1 capsule by mouth daily      Garlic 10 MG CAPS Take 1 capsule by mouth daily      !! gabapentin (NEURONTIN) 400 MG capsule Take 800 mg by mouth every evening. TRESIBA FLEXTOUCH 200 UNIT/ML SOPN Inject 160 Units into the skin every morning      naloxone 4 MG/0.1ML LIQD nasal spray       meloxicam (MOBIC) 15 MG tablet TAKE 1 TABLET BY MOUTH EVERY DAY WITH FOOD      !! gabapentin (NEURONTIN) 400 MG capsule Take 400 mg by mouth in the morning and at bedtime.  Morning and evening      JARDIANCE 10 MG tablet       atorvastatin (LIPITOR) 20 MG tablet       azelastine (ASTELIN) 0.1 % nasal spray 1 spray by Nasal route 2 times daily Use in each nostril as directed  Qty: 60 mL, Refills: 1    Associated Diagnoses: Dysfunction of both eustachian tubes      hydroCHLOROthiazide (HYDRODIURIL) 12.5 MG tablet Take 1 tablet by mouth daily  Qty: 30 tablet, Refills: 1      OXYGEN 4.5L oxygen into trilogy      albuterol (PROVENTIL) (2.5 MG/3ML) 0.083% nebulizer solution Inhale 2.5 mg into the lungs every 6 hours as needed      amLODIPine (NORVASC) 5 MG tablet Take 5 mg by mouth      famotidine (PEPCID) 40 MG tablet Take 40 mg by mouth daily      glipiZIDE (GLUCOTROL) 10 MG tablet Take 10 mg by mouth daily      insulin lispro (HUMALOG KWIKPEN) 200 UNIT/ML SOPN pen Inject into the skin See Admin Instructions 20 units breakfast 20 units lunch 40 units supper      lisinopril (PRINIVIL;ZESTRIL) 20 MG tablet Take 20 mg by mouth daily      metFORMIN (GLUCOPHAGE-XR) 500 MG extended release tablet Take 500 mg by mouth 2 at lunch and 2 at dinner      naloxegol (MOVANTIK) 25 MG TABS tablet Take by mouth every morning (before breakfast)      oxyCODONE (OXY-IR) 30 MG immediate release tablet Take 30 mg by mouth every 6 hours as needed. pantoprazole (PROTONIX) 40 MG tablet Take 40 mg by mouth 2 times daily      Sennosides 8.6 MG CAPS Take 1 capsule by mouth daily      tamsulosin (FLOMAX) 0.4 MG capsule TAKE 1 CAPSULE BY MOUTH DAILY      Vitamin E 400 units TABS Take 1 tablet by mouth      zolpidem (AMBIEN) 10 MG tablet Take 10 mg by mouth. !! - Potential duplicate medications found. Please discuss with provider.         STOP taking these medications       Insulin Degludec (TRESIBA FLEXTOUCH) 100 UNIT/ML SOPN Comments:   Reason for Stopping:               Procedures done this admission:  * No surgery found *    Consults this admission:  IP CONSULT TO PHYSICAL THERAPY  IP CONSULT TO OCCUPATIONAL THERAPY  IP CONSULT TO RESPIRATORY CARE    Echocardiogram results:  No results found for this or any previous visit. Diagnostic Imaging/Tests:   XR CHEST (2 VW)    Result Date: 10/24/2022  No acute cardiopulmonary abnormality. XR CHEST PORTABLE    Result Date: 10/25/2022  Nodular interstitial pattern. This could representfailure or atypical pneumonia. XR CHEST PORTABLE    Result Date: 10/23/2022  1. Stable mild cardiomegaly. This report was made using voice transcription. Despite my best efforts to avoid any, transcription errors may persist. If there is any question about the accuracy of the report or need for clarification, then please call [phone], or text me through [email or web address] for clarification or correction.         Labs: Results:       BMP, Mg, Phos Recent Labs     10/25/22  0552 10/26/22  0313 10/27/22  0245   * 136 133   K 4.5 4.5 3.9   CL 93* 97* 94*   CO2 35* 34* 35*   ANIONGAP 4 5 4   BUN 21 17 23   CREATININE 1.12 0.77* 0.90   LABGLOM >60 >60 >60   CALCIUM 8.5 8.1* 8.2*   GLUCOSE 271* 254* 278*   MG  --   --  2.3   PHOS 5.1* 3.7  --       CBC Recent Labs     10/25/22  0552 10/26/22  0313 10/27/22  0245   WBC 9.1 8.2 7.6   RBC 4.78 4.55 4.33   HGB 13.3* 12.7* 12.0*   HCT 42.7 39.9* 37.5*   MCV 89.3 87.7 86.6   MCH 27.8 27.9 27.7   MCHC 31.1* 31.8 32.0   RDW 13.5 13.2 13.2    182 194   MPV 10.3 10.6 10.0   NRBC 0.00 0.00 0.00   SEGS 78 78 78   LYMPHOPCT 13 13 14   EOSRELPCT 0* 0* 0*   MONOPCT 9 8 8   BASOPCT 0 0 0   IMMGRAN 0 0 1   SEGSABS 7.1 6.4 5.9   LYMPHSABS 1.2 1.1 1.1   EOSABS 0.0 0.0 0.0   MONOSABS 0.8 0.7 0.6   BASOSABS 0.0 0.0 0.0   ABSIMMGRAN 0.0 0.0 0.0      LFT Recent Labs     10/25/22  0552 10/26/22  0313 10/27/22  0245   BILITOT 0.5 0.4 0.4   ALKPHOS 59 54 53   AST 24 24 19   ALT 36 27 29   PROT 7.0 5.9* 6.3   LABALBU 3.3 2.7* 2.6*   GLOB 3.7 3.2 3.7      Cardiac  Lab Results   Component Value Date/Time    NTPROBNP 414 10/24/2022 12:54 PM    TROPHS 11.3 10/24/2022 05:21 PM    TROPHS 11.8 10/24/2022 03:15 PM    TROPHS 10.7 10/24/2022 12:54 PM      Coags No results found for: PROTIME, INR, APTT   A1c Lab Results   Component Value Date/Time    LABA1C 9.1 10/25/2022 05:52 AM    LABA1C 8.3 06/27/2022 03:00 PM    LABA1C 8.6 02/17/2022 02:53 PM     10/25/2022 05:52 AM     06/27/2022 03:00 PM     02/17/2022 02:53 PM      Lipids Lab Results   Component Value Date/Time    CHOL 119 10/12/2022 03:17 PM    LDLCALC 40 10/12/2022 03:17 PM    LABVLDL 45 10/12/2022 03:17 PM    HDL 34 10/12/2022 03:17 PM    CHOLHDLRATIO 3.5 10/12/2022 03:17 PM    TRIG 225 10/12/2022 03:17 PM      Thyroid  Lab Results   Component Value Date/Time    TSHELE 0.68 10/25/2022 05:52 AM    TSHELE 0.73 03/03/2021 10:16 AM    NDH0JGU 0.514 10/12/2022 03:17 PM        Most Recent UA No results found for: COLORU, APPEARANCE, SPECGRAV, LABPH, PROTEINU, GLUCOSEU, KETUA, BILIRUBINUR, BLOODU, UROBILINOGEN, NITRU, LEUKOCYTESUR, WBCUA, RBCUA, EPITHUA, BACTERIA, LABCAST, MUCUS     Recent Labs     10/25/22  1241 10/23/22  1707   CULTURE HEAVY NORMAL RESPIRATORY JANE NO GROWTH 4 DAYS  NO GROWTH 4 DAYS       All Labs from Last 24 Hrs:  Recent Results (from the past 24 hour(s))   POCT Glucose    Collection Time: 10/26/22 11:39 AM   Result Value Ref Range    POC Glucose 148 (H) 65 - 100 mg/dL    Performed by: Arlin Melgar    POCT Glucose    Collection Time: 10/26/22  5:10 PM   Result Value Ref Range    POC Glucose 392 (H) 65 - 100 mg/dL    Performed by: Arlin Melgar    POCT Glucose    Collection Time: 10/26/22  9:29 PM   Result Value Ref Range    POC Glucose 299 (H) 65 - 100 mg/dL    [facility] by: [facility]    Comprehensive Metabolic Panel    Collection Time: 10/27/22  2:45 AM   Result Value Ref Range    Sodium 133 133 - 143 mmol/L    Potassium 3.9 3.5 - 5.1 mmol/L    Chloride 94 (L) 101 - 110 mmol/L    CO2 35 (H) 21 - 32 mmol/L    Anion Gap 4 2 - 11 mmol/L    Glucose 278 (H) 65 - 100 mg/dL    BUN 23 8 - 23 MG/DL    Creatinine 0.90 0.8 - 1.5 MG/DL    Est, Glom Filt Rate >60 >60 ml/min/1.73m2    Calcium 8.2 (L) 8.3 - 10.4 MG/DL    Total Bilirubin 0.4 0.2 - 1.1 MG/DL    ALT 29 12 - 65 U/L    AST 19 15 - 37 U/L    Alk Phosphatase 53 50 - 136 U/L    Total Protein 6.3 6.3 - 8.2 g/dL    Albumin 2.6 (L) 3.2 - 4.6 g/dL    Globulin 3.7 2.8 - 4.5 g/dL    Albumin/Globulin Ratio 0.7 0.4 - 1.6     CBC with Auto Differential    Collection Time: 10/27/22  2:45 AM   Result Value Ref Range    WBC 7.6 4.3 - 11.1 K/uL    RBC 4.33 4.23 - 5.6 M/uL    Hemoglobin 12.0 (L) 13.6 - 17.2 g/dL    Hematocrit 37.5 (L) 41.1 - 50.3 %    MCV 86.6 82.0 - 102.0 FL    MCH 27.7 26.1 - 32.9 PG    MCHC 32.0 31.4 - 35.0 g/dL    RDW 13.2 11.9 - 14.6 %    Platelets 193 051 - 996 K/uL    MPV 10.0 9.4 - 12.3 FL    nRBC 0.00 0.0 - 0.2 K/uL    Differential Type AUTOMATED      Seg Neutrophils 78 43 - 78 %    Lymphocytes 14 13 - 44 %    Monocytes 8 4.0 - 12.0 %    Eosinophils % 0 (L) 0.5 - 7.8 %    Basophils 0 0.0 - 2.0 %    Immature Granulocytes 1 0.0 - 5.0 %    Segs Absolute 5.9 1.7 - 8.2 K/UL    Absolute Lymph # 1.1 0.5 - 4.6 K/UL    Absolute Mono # 0.6 0.1 - 1.3 K/UL    Absolute Eos # 0.0 0.0 - 0.8 K/UL    Basophils Absolute 0.0 0.0 - 0.2 K/UL    Absolute Immature Granulocyte 0.0 0.0 - 0.5 K/UL   Magnesium    Collection Time: 10/27/22  2:45 AM   Result Value Ref Range    Magnesium 2.3 1.8 - 2.4 mg/dL   POCT Glucose    Collection Time: 10/27/22  7:37 AM   Result Value Ref Range    POC Glucose 157 (H) 65 - 100 mg/dL    Performed by: Ashley        Allergies   Allergen Reactions    Adhesive Tape Hives     bandaids       Immunization History   Administered Date(s) Administered    COVID-19, PFIZER PURPLE top, DILUTE for use, (age 15 y+), 30mcg/0.3mL 04/16/2021, 05/10/2021    Influenza, FLUARIX, FLULAVAL, FLUZONE (age 10 mo+) AND AFLURIA, (age 1 y+), PF, 0.5mL 12/12/2020    PPD Test 10/25/2022       Recent Vital Data:  Patient Vitals for the past 24 hrs:   Temp Pulse Resp BP SpO2   10/27/22 0855 -- 89 18 -- 92 %   10/27/22 0818 -- -- 18 -- --   10/27/22 0700 -- 70 16 117/73 93 %   10/27/22 0600 -- 67 15 111/70 94 %   10/27/22 0500 -- 68 14 103/73 93 %   10/27/22 0400 -- 69 15 98/69 90 %   10/27/22 0300 98.8 °F (37.1 °C) -- -- -- --   10/27/22 0100 -- 94 18 109/76 (!) 89 %   10/27/22 0000 -- (!) 103 -- -- --   10/26/22 2358 -- -- 18 -- --   10/26/22 2330 -- 90 19 -- 95 %   10/26/22 2328 -- -- 18 -- --   10/26/22 2300 97.7 °F (36.5 °C) 95 (!) 32 (!) 98/54 96 %   10/26/22 2230 -- 90 23 -- 97 %   10/26/22 2200 -- 91 21 (!) 93/55 94 %   10/26/22 2130 -- 91 19 -- 95 %   10/26/22 2100 -- 97 23 (!) 105/59 (!) 85 %   10/26/22 2027 -- 87 18 -- 94 %   10/26/22 2000 -- 95 21 (!) 95/58 92 %   10/26/22 1930 -- 92 18 -- 93 %   10/26/22 1925 98.1 °F (36.7 °C) 93 16 109/62 94 %   10/26/22 1900 -- 100 14 109/62 91 %   10/26/22 1800 -- 93 15 (!) 106/42 92 %   10/26/22 1753 -- -- 20 -- --   10/26/22 1600 97.8 °F (36.6 °C) 95 13 (!) 102/49 94 %   10/26/22 1538 -- 87 21 -- 94 %   10/26/22 1500 -- 87 16 95/80 92 %   10/26/22 1400 -- 92 17 104/69 91 %   10/26/22 1300 -- 89 19 111/79 92 %   10/26/22 1219 -- -- 17 -- --   10/26/22 1119 97.8 °F (36.6 °C) 97 21 (!) 89/59 93 %   10/26/22 1100 -- 96 -- (!) 89/59 92 %   10/26/22 1000 -- 96 21 100/73 91 %       Oxygen Therapy  SpO2: 92 %  Pulse Oximetry Type: Continuous  Pulse via Oximetry: 70 beats per minute  SPO2 High Alarm Limit: 100  SPO2 Low Alarm Limit POX: 88  Pulse Oximeter Device Mode: Continuous  Pulse Oximeter Device Location: Left, Finger  O2 Device: High flow nasal cannula  Oximetry Probe Site Changed: No  Skin Assessment: Clean, dry, & intact  Skin Protection for O2 Device: N/A  O2 Flow Rate (L/min): 3 L/min  Blood Gas  Performed?: Yes  Jeremías's Test #1: Collateral flow confirmed  Site #1: Right Radial  Site Prepped #1: Yes  Number of Attempts #1: 1  Pressure Held #1: Yes  Complications #1: None  Post-procedure #1: Standard  Specimen Status #1: Point of care  How Tolerated?: Other (Comment)  Oxygen Therapy: Supplemental oxygen  O2 Delivery Method: CPAP/Bi-PAP mask    Estimated body mass index is 44.01 kg/m² as calculated from the following:    Height as of this encounter: 5' 9\" (1.753 m). Weight as of this encounter: 298 lb (135.2 kg). Intake/Output Summary (Last 24 hours) at 10/27/2022 0906  Last data filed at 10/27/2022 0905  Gross per 24 hour   Intake 414.67 ml   Output 1925 ml   Net -1510.33 ml         Physical Exam:    General:    Alert and oriented x3-feels much better. Dyspnea significantly improved. Head:  Normocephalic, atraumatic  Eyes:  Sclerae appear normal.  Pupils equally round. HENT:  Nares appear normal, no drainage. Moist mucous membranes  Neck:  No restricted ROM. Trachea midline  CV:   RRR. No m/r/g. No JVD  Lungs:   Decreased breath sounds bilateral but clear. No wheezing at time of exam.  No rhonchi at time of exam  Abdomen:   Soft, nontender, nondistended. Extremities: Warm and dry. No cyanosis or clubbing. No increased peripheral edema  Neuro:  CN II-XII grossly intact. Psych:  Normal mood and affect. Signed:  Yuliet Arroyo DO    Part of this note may have been written by using a voice dictation software. The note has been proof read but may still contain some grammatical/other typographical errors.

## 2022-10-28 ENCOUNTER — CARE COORDINATION (OUTPATIENT)
Dept: CARE COORDINATION | Facility: CLINIC | Age: 67
End: 2022-10-28

## 2022-10-28 LAB
BACTERIA SPEC CULT: NORMAL
BACTERIA SPEC CULT: NORMAL
SERVICE CMNT-IMP: NORMAL
SERVICE CMNT-IMP: NORMAL

## 2022-10-28 NOTE — CARE COORDINATION
[facility] Care Transitions Initial Follow Up Call    Call within 2 business days of discharge: Yes    Care Transition Nurse contacted the patient by telephone to perform post hospital discharge assessment. Verified name and  with patient as identifiers. Provided introduction to self, and explanation of the Care Transition Nurse role. Patient: Sasha Agee Patient : [birth year]   MRN: [id number]  Reason for Admission: Flu, DM2, COPD  Discharge Date: 10/27/22 RARS: Readmission Risk Score: 11.8      Last Discharge  [address]       Date Complaint Diagnosis Description Type Department Provider    10/23/22   Admission (Discharged) [id number] Araceli Marie,     10/23/22 Shortness of Breath; Cough; Blood Sugar 320 Acute respiratory failure with hypoxia ([address] . .. ED (TRANSFER) GUILLE Caro MD; Everette Vega. .. Was this an external facility discharge? No Discharge Facility: [facility]    Challenges to be reviewed by the provider   Additional needs identified to be addressed with provider: Yes  Attempt to move previous scheduled appt closer to d/c               Method of communication with provider: phone. below    Care Transition Nurse reviewed discharge instructions, medical action plan, and red flags with patient who verbalized understanding. The patient was given an opportunity to ask questions and does not have any further questions or concerns at this time. Were discharge instructions available to patient? Yes. Reviewed appropriate site of care based on symptoms and resources available to patient including: PCP  CTN . The patient agrees to contact the PCP office for questions related to their healthcare. Advance Care Planning:   Does patient have an Advance Directive: not on file. Medication reconciliation was performed with patient, who verbalizes understanding of administration of home medications.  Medications reviewed, 1111F entered: yes    Was patient discharged with a pulse oximeter? no    Non-face-to-face services provided:  Scheduled appointment with PCP-11/16, CTN attempting to move closer to d/c  Obtained and reviewed discharge summary and/or continuity of care documents    Offered patient enrollment in the Remote Patient Monitoring (RPM) program for in-home monitoring: NA.    Care Transitions 24 Hour Call    Schedule Follow Up Appointment with PCP: Completed  Do you have a copy of your discharge instructions?: Yes  Do you have all of your prescriptions and are they filled?: Yes  Have you been contacted by a [id number] Pharmacist?: No  Have you scheduled your follow up appointment?: Yes (Comment: CTN to attempt to move closer to d/c date)  How are you going to get to your appointment?: Car - family or friend to transport  Do you have support at home?: Partner/Spouse/SO  Do you feel like you have everything you need to keep you well at home?: Yes  Are you an active caregiver in your home?: No  Care Transitions Interventions         Follow Up  Future Appointments   Date Time Provider Leyda Brody   11/15/2022  3:20 PM DENA Sanches CNP PSC[facility]AD [facility] AMB   11/16/2022  3:15 PM MD OMAR Townsend [facility] AMB   12/21/2022  2:00 PM Zora Bosworth, MD ENTG [facility] AMB   12/21/2022  2:30 PM Marlyn Masterson E[facility] AMB   1/12/2023  2:45 PM MD OMAR Townsend [facility] AMB   1/24/2023  3:30 PM DENA Mccormick CNP PPS [facility] AMB       Care Transition Nurse provided contact information. Plan for follow-up call in 5-7 days based on severity of symptoms and risk factors. Plan for next call: symptom management-assess new or worsening s/s of concern  follow-up appointment-assist to move closer, if office is able to offer.     Byron Cruz RN

## 2022-10-29 LAB
BACTERIA SPEC CULT: NORMAL
GRAM STN SPEC: NORMAL
SERVICE CMNT-IMP: NORMAL

## 2022-11-01 ENCOUNTER — CARE COORDINATION (OUTPATIENT)
Dept: CARE COORDINATION | Facility: CLINIC | Age: 67
End: 2022-11-01

## 2022-11-01 NOTE — CARE COORDINATION
[facility] Care Transitions Follow Up Call    Care Transition Nurse contacted the patient by telephone to follow up after admission on 10/28. Verified name and  with patient as identifiers. Patient: Nata Diaz  Patient : 1955   MRN: [id number]  Reason for Admission: 10/28  Discharge Date: 10/27/22 RARS: Readmission Risk Score: 11.8      Needs to be reviewed by the provider   Additional needs identified to be addressed with provider: No  none             Method of communication with provider: none. below    Addressed changes since last contact:   CTN had appt changed after d/c  Discussed follow-up appointments. If no appointment was previously scheduled, appointment scheduling offered: Yes. Is follow up appointment scheduled within 7 days of discharge? Yes. Follow Up  Future Appointments   Date Time Provider Leyda Brody   11/3/2022 11:15 AM Nayely Baltazar MD [facility]   11/15/2022  3:20 PM Toni Wilder APRN - CNP PSCD [facility] AMB   2022  3:15 PM MD OMAR Cortez [facility] AMB   2022  2:00 PM MD ISRAEL Livingston [facility] AMB   2022  2:30 PM Marlyn Pierre E[facility]ICA [facility] AMB   2023  2:45 PM MD OMAR Cortez [facility] AMB   2023  3:30 PM DENA Madison - CNP PPS [facility] AMB     [facility] follow up appointment(s): linda    Care Transition Nurse reviewed discharge instructions, medical action plan, and red flags with patient and discussed any barriers to care and/or understanding of plan of care after discharge. Discussed appropriate site of care based on symptoms and resources available to patient including: PCP  Specialist  CTN . The patient agrees to contact the PCP office for questions related to their healthcare. Advance Care Planning:   not on file.      Patients top risk factors for readmission: medical condition-FLU, DM2, COPD, CKD  Interventions to address risk factors: Scheduled appointment with PCP-11/3 and Obtained and reviewed discharge summary and/or continuity of care documents    Offered patient enrollment in the Remote Patient Monitoring (RPM) program for in-home monitoring: NA.     Care Transitions Subsequent and Final Call    Subsequent and Final Calls  Do you have any ongoing symptoms?: Yes  Onset of Patient-reported symptoms: In the past 7 days  Patient-reported symptoms: Congestion  Interventions for patient-reported symptoms: Notified PCP/Physician  Have your medications changed?: No  Do you have any questions related to your medications?: No  Do you have any needs or concerns that I can assist you with?: No (Comment: CTN had PCP office visit moved closerto d/c)  Care Transitions Interventions  Other Interventions:             Care Transition Nurse provided contact information for future needs. Plan for follow-up call in 10-14 days based on severity of symptoms and risk factors.   Plan for next call: symptom management-assess for worsening s/s to report    Ganga Gaston RN

## 2022-11-03 ENCOUNTER — OFFICE VISIT (OUTPATIENT)
Dept: FAMILY MEDICINE CLINIC | Facility: CLINIC | Age: 67
End: 2022-11-03

## 2022-11-03 VITALS
WEIGHT: 296 LBS | HEIGHT: 69 IN | DIASTOLIC BLOOD PRESSURE: 78 MMHG | BODY MASS INDEX: 43.84 KG/M2 | SYSTOLIC BLOOD PRESSURE: 138 MMHG

## 2022-11-03 DIAGNOSIS — I10 ESSENTIAL HYPERTENSION, BENIGN: ICD-10-CM

## 2022-11-03 DIAGNOSIS — E78.00 HIGH CHOLESTEROL: ICD-10-CM

## 2022-11-03 DIAGNOSIS — J44.0 CHRONIC OBSTRUCTIVE PULMONARY DISEASE WITH ACUTE LOWER RESPIRATORY INFECTION (HCC): ICD-10-CM

## 2022-11-03 DIAGNOSIS — Z79.4 TYPE 2 DIABETES MELLITUS WITH HYPERGLYCEMIA, WITH LONG-TERM CURRENT USE OF INSULIN (HCC): Primary | ICD-10-CM

## 2022-11-03 DIAGNOSIS — E11.65 TYPE 2 DIABETES MELLITUS WITH HYPERGLYCEMIA, WITH LONG-TERM CURRENT USE OF INSULIN (HCC): Primary | ICD-10-CM

## 2022-11-03 DIAGNOSIS — Z09 HOSPITAL DISCHARGE FOLLOW-UP: ICD-10-CM

## 2022-11-03 DIAGNOSIS — K21.9 GASTROESOPHAGEAL REFLUX DISEASE, UNSPECIFIED WHETHER ESOPHAGITIS PRESENT: ICD-10-CM

## 2022-11-03 DIAGNOSIS — E66.01 CLASS 3 SEVERE OBESITY DUE TO EXCESS CALORIES WITH SERIOUS COMORBIDITY AND BODY MASS INDEX (BMI) OF 45.0 TO 49.9 IN ADULT (HCC): ICD-10-CM

## 2022-11-03 DIAGNOSIS — N30.00 ACUTE CYSTITIS WITHOUT HEMATURIA: ICD-10-CM

## 2022-11-03 RX ORDER — LEVOFLOXACIN 500 MG/1
500 TABLET, FILM COATED ORAL DAILY
Qty: 5 TABLET | Refills: 0 | Status: SHIPPED | OUTPATIENT
Start: 2022-11-03 | End: 2022-11-08

## 2022-11-03 ASSESSMENT — PATIENT HEALTH QUESTIONNAIRE - PHQ9
SUM OF ALL RESPONSES TO PHQ QUESTIONS 1-9: 0
2. FEELING DOWN, DEPRESSED OR HOPELESS: 0
SUM OF ALL RESPONSES TO PHQ QUESTIONS 1-9: 0
1. LITTLE INTEREST OR PLEASURE IN DOING THINGS: 0
SUM OF ALL RESPONSES TO PHQ9 QUESTIONS 1 & 2: 0

## 2022-11-04 NOTE — TELEPHONE ENCOUNTER
Elaine Forte this patient has called requesting the hydrchlorothiazide 12.5 mg I pend order not sure if he is still taking what I can see it looks like it was discoutinued. . please correct me if I am wrong. ... doug eli

## 2022-11-04 NOTE — PROGRESS NOTES
Post-Discharge Transitional Care  Follow Up      Tea Ennis   YOB: 1955    Date of Office Visit:  11/3/2022  Date of Hospital Admission: 10/23/22  Date of Hospital Discharge: 10/27/22  Risk of hospital readmission (high >=14%. Medium >=10%) :Readmission Risk Score: 11.8      Care management risk score Rising risk (score 2-5) and Complex Care (Scores >=6): No Risk Score On File     Non face to face  following discharge, date last encounter closed (first attempt may have been earlier): 10/28/2022    Call initiated 2 business days of discharge: Yes    ASSESSMENT/PLAN:   Type 2 diabetes mellitus with hyperglycemia, with long-term current use of insulin (HCC)  Chronic obstructive pulmonary disease with acute lower respiratory infection (HCC)  -     levoFLOXacin (LEVAQUIN) 500 MG tablet; Take 1 tablet by mouth daily for 5 days, Disp-5 tablet, R-0Normal  Class 3 severe obesity due to excess calories with serious comorbidity and body mass index (BMI) of 45.0 to 49.9 in adult (HCC)  High cholesterol  Gastroesophageal reflux disease, unspecified whether esophagitis present  Essential hypertension, benign  Acute cystitis without hematuria  -     levoFLOXacin (LEVAQUIN) 500 MG tablet; Take 1 tablet by mouth daily for 5 days, Disp-5 tablet, R-0Normal  Hospital discharge follow-up  -     LA DISCHARGE MEDS RECONCILED W/ CURRENT OUTPATIENT MED LIST      Medical Decision Making: moderate complexity  Return in about 3 months (around 2/3/2023). Subjective:   HPI:  Follow up of Hospital problems/diagnosis(es): respiratory failure    Inpatient course: Discharge summary reviewed- see chart.     Interval history/Current status: slowly improving, still with fatigue after pneumonia and UTI but improving, will need extension of levaquin for 5 more days    Patient Active Problem List   Diagnosis    Spinal stenosis of lumbar region with neurogenic claudication    Hypoxemia    Essential hypertension, benign Spinal stenosis, lumbar region, with neurogenic claudication    Chest pain    Obesity (BMI 30-39. 9)    High cholesterol    COPD (chronic obstructive pulmonary disease) (HCC)    Hypocalcemia    Type 2 diabetes mellitus with hyperglycemia, with long-term current use of insulin (HCC)    LOPEZ (dyspnea on exertion)    Class 3 severe obesity due to excess calories with serious comorbidity and body mass index (BMI) of 45.0 to 49.9 in adult [facility])    Chronic low back pain    GERD (gastroesophageal reflux disease)    MISTI treated with BiPAP    Type 2 diabetes mellitus with hyperglycemia    Flu       Medications listed as ordered at the time of discharge from hospital     Medication List            Accurate as of November 3, 2022  9:19 PM. If you have any questions, ask your nurse or doctor. START taking these medications      levoFLOXacin 500 MG tablet  Commonly known as: LEVAQUIN  Take 1 tablet by mouth daily for 5 days  Started by:  Ines Hager MD            CHANGE how you take these medications      azelastine 0.1 % nasal spray  Commonly known as: ASTELIN  1 spray by Nasal route 2 times daily Use in each nostril as directed  What changed:   when to take this  reasons to take this            CONTINUE taking these medications      albuterol (2.5 MG/3ML) 0.083% nebulizer solution  Commonly known as: PROVENTIL     amLODIPine 5 MG tablet  Commonly known as: NORVASC     atorvastatin 20 MG tablet  Commonly known as: LIPITOR     Cinnamon 500 MG Caps     famotidine 40 MG tablet  Commonly known as: PEPCID     * gabapentin 400 MG capsule  Commonly known as: NEURONTIN     * gabapentin 400 MG capsule  Commonly known as: NEURONTIN     Garlic 10 MG Caps     glipiZIDE 10 MG tablet  Commonly known as: GLUCOTROL     HumaLOG KwikPen 200 UNIT/ML Sopn pen  Generic drug: insulin lispro     hydroCHLOROthiazide 12.5 MG tablet  Commonly known as: HYDRODIURIL  Take 1 tablet by mouth daily     Jardiance 10 MG tablet  Generic drug: empagliflozin     lisinopril 20 MG tablet  Commonly known as: PRINIVIL;ZESTRIL     meloxicam 15 MG tablet  Commonly known as: MOBIC     metFORMIN 500 MG extended release tablet  Commonly known as: GLUCOPHAGE-XR     naloxegol 25 MG Tabs tablet  Commonly known as: MOVANTIK     naloxone 4 MG/0.1ML Liqd nasal spray     oxyCODONE 30 MG immediate release tablet  Commonly known as: OXY-IR     OXYGEN     pantoprazole 40 MG tablet  Commonly known as: PROTONIX     predniSONE 10 MG tablet  Commonly known as: DELTASONE  Take 4 daily for 3 days then 3 daily for 3 days then 2 daily for 3 days and then 1 daily     Sennosides 8.6 MG Caps     tamsulosin 0.4 MG capsule  Commonly known as: FLOMAX     Tresiba FlexTouch 200 UNIT/ML Sopn  Generic drug: Insulin Degludec     VITAMIN B COMPLEX-C PO     Vitamin D3 50 MCG (2000 UT) Caps     Vitamin E 400 units Tabs     zolpidem 10 MG tablet  Commonly known as: AMBIEN           * This list has 2 medication(s) that are the same as other medications prescribed for you. Read the directions carefully, and ask your doctor or other care provider to review them with you.                    Where to Get Your Medications        These medications were sent to [facility] [facility] [address]      Phone: [phone]   levoFLOXacin 500 MG tablet           Medications marked \"taking\" at this time  Outpatient Medications Marked as Taking for the 11/3/22 encounter (Office Visit) with Baylee Zaman MD   Medication Sig Dispense Refill    levoFLOXacin (LEVAQUIN) 500 MG tablet Take 1 tablet by mouth daily for 5 days 5 tablet 0    predniSONE (DELTASONE) 10 MG tablet Take 4 daily for 3 days then 3 daily for 3 days then 2 daily for 3 days and then 1 daily 30 tablet 0    TRESIBA FLEXTOUCH 200 UNIT/ML SOPN Inject 160 Units into the skin every morning      Cholecalciferol (VITAMIN D3) 50 MCG (2000 UT) CAPS Take 1 capsule by mouth daily      VITAMIN B COMPLEX-C PO Take 1 capsule by mouth daily      Cinnamon 500 MG CAPS Take 1 capsule by mouth daily      Garlic 10 MG CAPS Take 1 capsule by mouth daily      gabapentin (NEURONTIN) 400 MG capsule Take 800 mg by mouth every evening. naloxone 4 MG/0.1ML LIQD nasal spray       meloxicam (MOBIC) 15 MG tablet       gabapentin (NEURONTIN) 400 MG capsule Take 400 mg by mouth in the morning and at bedtime. Morning and evening      atorvastatin (LIPITOR) 20 MG tablet       azelastine (ASTELIN) 0.1 % nasal spray 1 spray by Nasal route 2 times daily Use in each nostril as directed (Patient taking differently: 1 spray by Nasal route nightly as needed for Rhinitis Use in each nostril as directed) 60 mL 1    hydroCHLOROthiazide (HYDRODIURIL) 12.5 MG tablet Take 1 tablet by mouth daily 30 tablet 1    OXYGEN 4.5L oxygen into trilogy      albuterol (PROVENTIL) (2.5 MG/3ML) 0.083% nebulizer solution Inhale 2.5 mg into the lungs every 6 hours as needed      amLODIPine (NORVASC) 5 MG tablet Take 5 mg by mouth      famotidine (PEPCID) 40 MG tablet Take 40 mg by mouth daily      glipiZIDE (GLUCOTROL) 10 MG tablet Take 10 mg by mouth daily      insulin lispro (HUMALOG KWIKPEN) 200 UNIT/ML SOPN pen Inject into the skin See Admin Instructions 20 units breakfast 20 units lunch 40 units supper      lisinopril (PRINIVIL;ZESTRIL) 20 MG tablet Take 20 mg by mouth daily      metFORMIN (GLUCOPHAGE-XR) 500 MG extended release tablet Take 500 mg by mouth 2 at lunch and 2 at dinner      naloxegol (MOVANTIK) 25 MG TABS tablet Take by mouth every morning (before breakfast)      oxyCODONE (OXY-IR) 30 MG immediate release tablet Take 30 mg by mouth every 6 hours as needed.       pantoprazole (PROTONIX) 40 MG tablet Take 40 mg by mouth 2 times daily      Sennosides 8.6 MG CAPS Take 1 capsule by mouth daily      tamsulosin (FLOMAX) 0.4 MG capsule TAKE 1 CAPSULE BY MOUTH DAILY Vitamin E 400 units TABS Take 1 tablet by mouth      zolpidem (AMBIEN) 10 MG tablet Take 10 mg by mouth. Medications patient taking as of now reconciled against medications ordered at time of hospital discharge: Yes    A comprehensive review of systems was negative except for what was noted in the HPI. Objective:    /78 (Site: Left Upper Arm, Position: Sitting)   Ht 5' 9\" (1.753 m)   Wt 296 lb (134.3 kg)   BMI 43.71 kg/m²   General Appearance: alert and oriented to person, place and time, well-developed and well-nourished, in no acute distress  Pulmonary/Chest: clear to auscultation bilaterally- no wheezes, rales or rhonchi, normal air movement, no respiratory distress  Cardiovascular: normal rate, normal S1 and S2, no gallops, intact distal pulses, and no carotid bruits  Extremities: no cyanosis and no clubbing      An electronic signature was used to authenticate this note.   --Veronica Steele MD

## 2022-11-06 NOTE — TELEPHONE ENCOUNTER
Refills need to come from the original ordering provider, which is not me. I only gave him enough until he could get back in to see his other provider. Please cancel order.

## 2022-11-07 RX ORDER — HYDROCHLOROTHIAZIDE 12.5 MG/1
TABLET ORAL
Qty: 30 TABLET | Refills: 1 | OUTPATIENT
Start: 2022-11-07

## 2022-11-10 ENCOUNTER — OFFICE VISIT (OUTPATIENT)
Dept: FAMILY MEDICINE CLINIC | Facility: CLINIC | Age: 67
End: 2022-11-10
Payer: MEDICARE

## 2022-11-10 VITALS
WEIGHT: 299 LBS | HEIGHT: 69 IN | DIASTOLIC BLOOD PRESSURE: 70 MMHG | BODY MASS INDEX: 44.28 KG/M2 | SYSTOLIC BLOOD PRESSURE: 120 MMHG

## 2022-11-10 DIAGNOSIS — E11.65 TYPE 2 DIABETES MELLITUS WITH HYPERGLYCEMIA, WITH LONG-TERM CURRENT USE OF INSULIN (HCC): ICD-10-CM

## 2022-11-10 DIAGNOSIS — Z79.4 TYPE 2 DIABETES MELLITUS WITH HYPERGLYCEMIA, WITH LONG-TERM CURRENT USE OF INSULIN (HCC): ICD-10-CM

## 2022-11-10 DIAGNOSIS — I10 ESSENTIAL HYPERTENSION, BENIGN: ICD-10-CM

## 2022-11-10 DIAGNOSIS — M54.50 LUMBAR PAIN: Primary | ICD-10-CM

## 2022-11-10 DIAGNOSIS — E66.01 CLASS 3 SEVERE OBESITY DUE TO EXCESS CALORIES WITH SERIOUS COMORBIDITY AND BODY MASS INDEX (BMI) OF 45.0 TO 49.9 IN ADULT (HCC): ICD-10-CM

## 2022-11-10 PROCEDURE — 99214 OFFICE O/P EST MOD 30 MIN: CPT | Performed by: FAMILY MEDICINE

## 2022-11-10 PROCEDURE — 1123F ACP DISCUSS/DSCN MKR DOCD: CPT | Performed by: FAMILY MEDICINE

## 2022-11-10 PROCEDURE — 3046F HEMOGLOBIN A1C LEVEL >9.0%: CPT | Performed by: FAMILY MEDICINE

## 2022-11-10 PROCEDURE — 3074F SYST BP LT 130 MM HG: CPT | Performed by: FAMILY MEDICINE

## 2022-11-10 PROCEDURE — 3078F DIAST BP <80 MM HG: CPT | Performed by: FAMILY MEDICINE

## 2022-11-10 RX ORDER — HYDROCHLOROTHIAZIDE 12.5 MG/1
12.5 TABLET ORAL DAILY
Qty: 90 TABLET | Refills: 1 | Status: SHIPPED | OUTPATIENT
Start: 2022-11-10

## 2022-11-10 RX ORDER — METAXALONE 800 MG/1
800 TABLET ORAL 3 TIMES DAILY
Qty: 30 TABLET | Refills: 0 | Status: SHIPPED | OUTPATIENT
Start: 2022-11-10 | End: 2022-11-20

## 2022-11-10 ASSESSMENT — ENCOUNTER SYMPTOMS
RHINORRHEA: 0
ABDOMINAL PAIN: 0
ABDOMINAL DISTENTION: 0
SINUS PAIN: 0
EYE REDNESS: 0
BLURRED VISION: 0
BACK PAIN: 1
BLOOD IN STOOL: 0
APNEA: 0
CHEST TIGHTNESS: 0
COUGH: 0
ANAL BLEEDING: 0
EYE ITCHING: 0
EYE DISCHARGE: 0
FACIAL SWELLING: 0
EYE PAIN: 0
SINUS PRESSURE: 0

## 2022-11-10 ASSESSMENT — PATIENT HEALTH QUESTIONNAIRE - PHQ9
SUM OF ALL RESPONSES TO PHQ QUESTIONS 1-9: 0
SUM OF ALL RESPONSES TO PHQ QUESTIONS 1-9: 0
1. LITTLE INTEREST OR PLEASURE IN DOING THINGS: 0
SUM OF ALL RESPONSES TO PHQ QUESTIONS 1-9: 0
2. FEELING DOWN, DEPRESSED OR HOPELESS: 0
SUM OF ALL RESPONSES TO PHQ9 QUESTIONS 1 & 2: 0
SUM OF ALL RESPONSES TO PHQ QUESTIONS 1-9: 0

## 2022-11-11 ENCOUNTER — CARE COORDINATION (OUTPATIENT)
Dept: CARE COORDINATION | Facility: CLINIC | Age: 67
End: 2022-11-11

## 2022-11-11 NOTE — PROGRESS NOTES
tightness. Cardiovascular:  Negative for chest pain and leg swelling. Gastrointestinal:  Negative for abdominal distention, abdominal pain, anal bleeding and blood in stool. Endocrine: Negative for cold intolerance and heat intolerance. Genitourinary:  Negative for difficulty urinating, dysuria, enuresis, flank pain, frequency and hematuria. Musculoskeletal:  Positive for back pain, gait problem and myalgias. Negative for arthralgias and joint swelling. Skin:  Negative for pallor and rash. Neurological:  Negative for dizziness, facial asymmetry, light-headedness and headaches. Psychiatric/Behavioral:  Negative for agitation, behavioral problems, confusion and sleep disturbance. The patient is not nervous/anxious. History:  Past Medical History:   Diagnosis Date    Acquired cyst of kidney     Arthritis     generalized     Balanoposthitis     BPH (benign prostatic hypertrophy)     Chronic pain     back     Claustrophobia     Depression     Dysuria     GERD (gastroesophageal reflux disease)     managed with medication     High cholesterol     Hypertension     managed with medication     Impotence of organic origin     Obesity (BMI 30-39. 9)     Obesity (BMI 30-39. 9)     BMI 40.1    Other testicular hypofunction     Tinnitus of both ears     Type 2 diabetes mellitus (HCC)     type 2, adverage glucose 150-200, symptomatic is unknown     Unspecified adverse effect of anesthesia     woke up in middle of procedure x 2    Unspecified sleep apnea     CPAP compliant    Wears dentures     uppers       Past Surgical History:   Procedure Laterality Date    ANKLE FRACTURE SURGERY  5 yrs ago    right with pinning    CARPAL TUNNEL RELEASE  over 10 yrs ago    bilateral    CIRCUMCISION      ORTHOPEDIC SURGERY      L4 and 5 compression with dustin in place       Family History   Problem Relation Age of Onset    Diabetes Sister     Diabetes Brother     Diabetes Sister     Diabetes Mother  Stroke Father     Hypertension Mother     Diabetes Father     Hypertension Father     Stroke Mother        Social History     Tobacco Use    Smoking status: Former     Packs/day: 3.00     Types: Cigarettes     Quit date: 10/5/1988     Years since quittin.1    Smokeless tobacco: Never    Tobacco comments:     Quit smoking: quit    Substance Use Topics    Alcohol use: Yes       Allergies   Allergen Reactions    Adhesive Tape Hives     bandaids         Current Outpatient Medications   Medication Sig Dispense Refill    metaxalone (SKELAXIN) 800 MG tablet Take 1 tablet by mouth 3 times daily for 10 days 30 tablet 0    hydroCHLOROthiazide (HYDRODIURIL) 12.5 MG tablet Take 1 tablet by mouth daily 90 tablet 1    TRESIBA FLEXTOUCH 200 UNIT/ML SOPN Inject 160 Units into the skin every morning      Cholecalciferol (VITAMIN D3) 50 MCG (2000 UT) CAPS Take 1 capsule by mouth daily      VITAMIN B COMPLEX-C PO Take 1 capsule by mouth daily      Cinnamon 500 MG CAPS Take 1 capsule by mouth daily      Garlic 10 MG CAPS Take 1 capsule by mouth daily      gabapentin (NEURONTIN) 400 MG capsule Take 800 mg by mouth every evening.  naloxone 4 MG/0.1ML LIQD nasal spray       meloxicam (MOBIC) 15 MG tablet       gabapentin (NEURONTIN) 400 MG capsule Take 400 mg by mouth in the morning and at bedtime.  Morning and evening      atorvastatin (LIPITOR) 20 MG tablet       azelastine (ASTELIN) 0.1 % nasal spray 1 spray by Nasal route 2 times daily Use in each nostril as directed (Patient taking differently: 1 spray by Nasal route nightly as needed for Rhinitis Use in each nostril as directed) 60 mL 1    OXYGEN 4.5L oxygen into trilogy      albuterol (PROVENTIL) (2.5 MG/3ML) 0.083% nebulizer solution Inhale 2.5 mg into the lungs every 6 hours as needed      amLODIPine (NORVASC) 5 MG tablet Take 5 mg by mouth      famotidine (PEPCID) 40 MG tablet Take 40 mg by mouth daily      glipiZIDE (GLUCOTROL) 10 MG tablet Take 10 mg by mouth daily      insulin lispro (HUMALOG KWIKPEN) 200 UNIT/ML SOPN pen Inject into the skin See Admin Instructions 20 units breakfast 20 units lunch 40 units supper      lisinopril (PRINIVIL;ZESTRIL) 20 MG tablet Take 20 mg by mouth daily      metFORMIN (GLUCOPHAGE-XR) 500 MG extended release tablet Take 500 mg by mouth 2 at lunch and 2 at dinner      naloxegol (MOVANTIK) 25 MG TABS tablet Take by mouth every morning (before breakfast)      oxyCODONE (OXY-IR) 30 MG immediate release tablet Take 30 mg by mouth every 6 hours as needed.  pantoprazole (PROTONIX) 40 MG tablet Take 40 mg by mouth 2 times daily      Sennosides 8.6 MG CAPS Take 1 capsule by mouth daily      tamsulosin (FLOMAX) 0.4 MG capsule TAKE 1 CAPSULE BY MOUTH DAILY      Vitamin E 400 units TABS Take 1 tablet by mouth      zolpidem (AMBIEN) 10 MG tablet Take 10 mg by mouth. No current facility-administered medications for this visit. Vitals:    /70 (Site: Left Upper Arm, Position: Sitting, Cuff Size: Large Adult)   Ht 5' 9\" (1.753 m)   Wt 299 lb (135.6 kg)   BMI 44.15 kg/m²     Physical Exam:  Physical Exam  Constitutional:       Appearance: He is obese. Cardiovascular:      Rate and Rhythm: Normal rate and regular rhythm. Pulses: Normal pulses. Pulmonary:      Effort: Pulmonary effort is normal.      Breath sounds: Normal breath sounds. Musculoskeletal:         General: Tenderness present. Comments: Pain to palpation left lower lumbar paraspinous muscles, sore to touch and reduced ROM of lower lumbar spine areas       Assessment/Plan:     ICD-10-CM    1. Lumbar pain  M54.50 metaxalone (SKELAXIN) 800 MG tablet      2. Essential hypertension, benign  I10 hydroCHLOROthiazide (HYDRODIURIL) 12.5 MG tablet      3. Class 3 severe obesity due to excess calories with serious comorbidity and body mass index (BMI) of 45.0 to 49.9 in adult ([facility])  E66.01     Z68.42       4.  Type 2 diabetes mellitus with hyperglycemia, with long-term current use of insulin (HCC)  E11.65     Z79.4         Back pain, appears to be lumbar strain. Muscle relaxer given and encourage home stretching as demonstrated, consider xray and or PT eval and treat if not improved by Monday or Tuesday.      HTN: stable on meds, needs refills HCTZ today  Obesity: contributes to likelihood of pain in back, discussed need for weight loss, etc.   DM: on meds needs refills today,      Alejo Batres MD

## 2022-11-11 NOTE — CARE COORDINATION
Patient has graduated from the Care Transitions program on 11/11 after IP stay for PNA, FLU. Patient/family has the ability to self-manage at this time. Patient has no further care management needs, no referral to the [facility] team for further management. Patient with no questions/concerns and interest in follow up at this time. Patient has Care Transition Nurse's contact information for any further questions, concerns, or needs.   Patients upcoming visits:    Future Appointments   Date Time Provider Leyda Brody   11/15/2022  3:20 PM DENA Arnett - CNP PSCD [facility]   12/21/2022  2:00 PM MD NALLELY VargasG [facility]   12/21/2022  2:30 PM Marlyn Isidro ENTMONICA [facility]B   1/12/2023  2:45 PM MD OMAR Allen [facility]   1/24/2023  3:30 PM DENA Gunn - WILLA PPS [facility]

## 2022-11-14 NOTE — PROGRESS NOTES
[address] [place] [address] Court, [address] [facility]  [phone]    Patient Name:  Juliette Sevilla  YOB: 1955      Office Visit 11/15/2022    CHIEF COMPLAINT:    Chief Complaint   Patient presents with    Sleep Problem         HISTORY OF PRESENT ILLNESS:  Patient is a 75 yo male seen today for follow up of MISTI and insomnia. Sleep study 10/29/2020 with AHI 21.9/hr with desaturations to 77%. He has OHS and hypercarbia per ABG. Since his last visit, he was changed to a Trilogy device with 4.5 L oxygen into pap therapy. He is sleeping well but using a full face mask now. He is having to adjust to that. We do not have a copy of his download but have requested from [address] [place] [address]. He continues to take oxycodone 30 mg QID, gabapentin and also ambien. He states the [place] helps to fall to sleep and stay asleep. He denies any side effects. He was hospitalized in October for the flu with respiratory failure, hypercarbia requiring bipap. He reports he has recovered well. His blood pressure was elevated on arrival, but when rechecked, was 142/86. Past Medical History:   Diagnosis Date    Acquired cyst of kidney     Arthritis     generalized     Balanoposthitis     BPH (benign prostatic hypertrophy)     Chronic pain     back     Claustrophobia     Depression     Dysuria     GERD (gastroesophageal reflux disease)     managed with medication     High cholesterol     Hypertension     managed with medication     Impotence of organic origin     Obesity (BMI 30-39. 9)     Obesity (BMI 30-39. 9)     BMI 40.1    Other testicular hypofunction     Tinnitus of both ears     Type 2 diabetes mellitus (HCC)     type 2, adverage glucose 150-200, symptomatic is unknown     Unspecified adverse effect of anesthesia     woke up in middle of procedure x 2    Unspecified sleep apnea     CPAP compliant    Wears dentures     uppers         Patient Active Problem List   Diagnosis    Spinal stenosis of lumbar region with neurogenic claudication    Hypoxemia    Essential hypertension, benign    Spinal stenosis, lumbar region, with neurogenic claudication    Chest pain    Obesity (BMI 30-39. 9)    High cholesterol    COPD (chronic obstructive pulmonary disease) (HCC)    Hypocalcemia    Type 2 diabetes mellitus with hyperglycemia, with long-term current use of insulin (HCC)    LOPEZ (dyspnea on exertion)    Class 3 severe obesity due to excess calories with serious comorbidity and body mass index (BMI) of 45.0 to 49.9 in adult [facility])    Chronic low back pain    GERD (gastroesophageal reflux disease)    MISTI treated with BiPAP    Type 2 diabetes mellitus with hyperglycemia    Flu          Past Surgical History:   Procedure Laterality Date    ANKLE FRACTURE SURGERY  5 yrs ago    right with pinning    CARPAL TUNNEL RELEASE  over 10 yrs ago    bilateral    CIRCUMCISION      ORTHOPEDIC SURGERY      L4 and 5 compression with dustin in place           Social History     Socioeconomic History    Marital status: Single     Spouse name: Not on file    Number of children: Not on file    Years of education: Not on file    Highest education level: Not on file   Occupational History    Not on file   Tobacco Use    Smoking status: Former     Packs/day: 3.00     Types: Cigarettes     Quit date: 10/5/1988     Years since quittin.1    Smokeless tobacco: Never    Tobacco comments:     Quit smoking: quit    Substance and Sexual Activity    Alcohol use: Yes    Drug use: No    Sexual activity: Not on file   Other Topics Concern    Not on file   Social History Narrative    Not on file     Social Determinants of Health     Financial Resource Strain: Not on file   Food Insecurity: Not on file   Transportation Needs: Not on file   Physical Activity: Inactive    Days of Exercise per Week: 0 days    Minutes of Exercise per Session: 0 min   Stress: Not on file   Social Connections: Not on file   Intimate Partner Violence: Not on file   Housing Stability: Not on file         Family History   Problem Relation Age of Onset    Diabetes Sister     Diabetes Brother     Diabetes Sister     Diabetes Mother     Stroke Father     Hypertension Mother     Diabetes Father     Hypertension Father     Stroke Mother          Allergies   Allergen Reactions    Adhesive Tape Hives     bandaids           Current Outpatient Medications   Medication Sig    zolpidem (AMBIEN) 10 MG tablet Take 1 tablet by mouth nightly as needed for Sleep for up to 181 days. metaxalone (SKELAXIN) 800 MG tablet Take 1 tablet by mouth 3 times daily for 10 days    hydroCHLOROthiazide (HYDRODIURIL) 12.5 MG tablet Take 1 tablet by mouth daily    TRESIBA FLEXTOUCH 200 UNIT/ML SOPN Inject 160 Units into the skin every morning    Cholecalciferol (VITAMIN D3) 50 MCG (2000 UT) CAPS Take 1 capsule by mouth daily    VITAMIN B COMPLEX-C PO Take 1 capsule by mouth daily    Cinnamon 500 MG CAPS Take 1 capsule by mouth daily    Garlic 10 MG CAPS Take 1 capsule by mouth daily    gabapentin (NEURONTIN) 400 MG capsule Take 800 mg by mouth every evening. naloxone 4 MG/0.1ML LIQD nasal spray     meloxicam (MOBIC) 15 MG tablet     gabapentin (NEURONTIN) 400 MG capsule Take 400 mg by mouth in the morning and at bedtime.  Morning and evening    atorvastatin (LIPITOR) 20 MG tablet     azelastine (ASTELIN) 0.1 % nasal spray 1 spray by Nasal route 2 times daily Use in each nostril as directed (Patient taking differently: 1 spray by Nasal route nightly as needed for Rhinitis Use in each nostril as directed)    OXYGEN 4.5L oxygen into trilogy    albuterol (PROVENTIL) (2.5 MG/3ML) 0.083% nebulizer solution Inhale 2.5 mg into the lungs every 6 hours as needed    famotidine (PEPCID) 40 MG tablet Take 40 mg by mouth daily    glipiZIDE (GLUCOTROL) 10 MG tablet Take 10 mg by mouth daily    insulin lispro (HUMALOG KWIKPEN) 200 UNIT/ML SOPN pen Inject into the skin See Admin Instructions 20 units breakfast 20 units lunch 40 units supper    lisinopril (PRINIVIL;ZESTRIL) 20 MG tablet Take 20 mg by mouth daily    metFORMIN (GLUCOPHAGE-XR) 500 MG extended release tablet Take 500 mg by mouth 2 at lunch and 2 at dinner    naloxegol (MOVANTIK) 25 MG TABS tablet Take by mouth every morning (before breakfast)    oxyCODONE (OXY-IR) 30 MG immediate release tablet Take 30 mg by mouth every 6 hours as needed. pantoprazole (PROTONIX) 40 MG tablet Take 40 mg by mouth 2 times daily    Sennosides 8.6 MG CAPS Take 1 capsule by mouth daily    tamsulosin (FLOMAX) 0.4 MG capsule TAKE 1 CAPSULE BY MOUTH DAILY    Vitamin E 400 units TABS Take 1 tablet by mouth    amLODIPine (NORVASC) 5 MG tablet Take 5 mg by mouth (Patient not taking: Reported on 11/15/2022)     No current facility-administered medications for this visit. REVIEW OF SYSTEMS:   CONSTITUTIONAL:   There is no history of fever, chills, night sweats, weight loss, weight gain, persistent fatigue, or lethargy/hypersomnolence. CARDIAC:   No chest pain, pressure, discomfort, palpitations, orthopnea, murmurs   GI:   No dysphagia, heartburn reflux, nausea/vomiting, diarrhea, abdominal pain, or bleeding. NEURO:   There is no history of AMS, persistent headache, decreased level of consciousness, seizures, or motor or sensory deficits. PHYSICAL EXAM:    Vitals:    11/15/22 1524   BP: (!) 162/88   Pulse: (!) 106   Resp: 16   Temp: (!) 96.7 °F (35.9 °C)   Weight: (!) 311 lb (141.1 kg)   Height: 5' 9\" (1.753 m)      Body mass index is 45.93 kg/m². GENERAL APPEARANCE:   The patient is obese and in no respiratory distress. HEENT:   PERRL. Conjunctivae unremarkable. Nasal mucosa is without epistaxis, exudate, or polyps. Gums and dentition are unremarkable. There is oropharyngeal narrowing. TMs are clear. NECK/LYMPHATIC:   Symmetrical with no elevation of jugular venous pulsation. Trachea midline. No thyroid enlargement.   No cervical adenopathy. LUNGS:   Normal respiratory effort with symmetrical lung expansion. Breath sounds clear bilaterally. HEART:   There is a regular rate and rhythm. No murmur, rub, or gallop. There is no edema in the lower extremities. ABDOMEN:   Soft and non-tender. No hepatosplenomegaly. Bowel sounds are normal.     NEURO:   The patient is alert and oriented to person, place, and time. Memory appears intact and mood is normal.  No gross sensorimotor deficits are present. ASSESSMENT:  (Medical Decision Making)      Diagnosis Orders   1. MISTI (obstructive sleep apnea)     Now on Trilogy for OHS and hypercarbia. Keep follow up with pulmonary soon    2. insomnia    He reports doing well on ambien and sleeping well. Refill     3. Hypoxemia- requiring 4.5 L into pap therapy     PLAN:  Continue Trilogy nightly with oxygen supplementation   Check KALA on trilogy and oxygen     Refill Ambien     Follow up will be in 6 months or sooner if needed  Keep follow up with pulmonary as scheduled       Orders Placed This Encounter   Procedures    Pulse oximetry, overnight     Scheduling Instructions:      Dynamic Recreation            Please send out Overnight Oximetry on trilogy 4 lpm qhs. Please Fax results to: [phone]       Orders Placed This Encounter   Medications    zolpidem (AMBIEN) 10 MG tablet     Sig: Take 1 tablet by mouth nightly as needed for Sleep for up to 181 days. Dispense:  30 tablet     Refill:  5           Collaborating Physician: Dr. Gio Ward    Over 50% of today's office visit was spent in face to face time reviewing test results, prognosis, importance of compliance, education about disease process, benefits of medications, instructions for management of acute flare-ups, and follow up plans. Total face to face time spent with patient was 20 minutes.         DENA Hollins CNP  Electronically signed

## 2022-11-15 ENCOUNTER — OFFICE VISIT (OUTPATIENT)
Dept: SLEEP MEDICINE | Age: 67
End: 2022-11-15
Payer: MEDICARE

## 2022-11-15 VITALS
TEMPERATURE: 96.7 F | SYSTOLIC BLOOD PRESSURE: 162 MMHG | RESPIRATION RATE: 16 BRPM | BODY MASS INDEX: 46.06 KG/M2 | HEIGHT: 69 IN | WEIGHT: 311 LBS | HEART RATE: 106 BPM | DIASTOLIC BLOOD PRESSURE: 88 MMHG

## 2022-11-15 DIAGNOSIS — R09.02 HYPOXEMIA: ICD-10-CM

## 2022-11-15 DIAGNOSIS — G47.33 OSA (OBSTRUCTIVE SLEEP APNEA): Primary | ICD-10-CM

## 2022-11-15 PROCEDURE — 99214 OFFICE O/P EST MOD 30 MIN: CPT | Performed by: NURSE PRACTITIONER

## 2022-11-15 PROCEDURE — 1123F ACP DISCUSS/DSCN MKR DOCD: CPT | Performed by: NURSE PRACTITIONER

## 2022-11-15 PROCEDURE — 3074F SYST BP LT 130 MM HG: CPT | Performed by: NURSE PRACTITIONER

## 2022-11-15 PROCEDURE — 3078F DIAST BP <80 MM HG: CPT | Performed by: NURSE PRACTITIONER

## 2022-11-15 RX ORDER — ZOLPIDEM TARTRATE 10 MG/1
10 TABLET ORAL NIGHTLY PRN
Qty: 30 TABLET | Refills: 5 | Status: SHIPPED | OUTPATIENT
Start: 2022-11-15 | End: 2023-05-15

## 2022-11-15 ASSESSMENT — SLEEP AND FATIGUE QUESTIONNAIRES
HOW LIKELY ARE YOU TO NOD OFF OR FALL ASLEEP WHILE SITTING INACTIVE IN A PUBLIC PLACE: 0
HOW LIKELY ARE YOU TO NOD OFF OR FALL ASLEEP WHILE WATCHING TV: 3
HOW LIKELY ARE YOU TO NOD OFF OR FALL ASLEEP WHILE LYING DOWN TO REST IN THE AFTERNOON WHEN CIRCUMSTANCES PERMIT: 0
HOW LIKELY ARE YOU TO NOD OFF OR FALL ASLEEP WHILE SITTING AND READING: 3
HOW LIKELY ARE YOU TO NOD OFF OR FALL ASLEEP IN A CAR, WHILE STOPPED FOR A FEW MINUTES IN TRAFFIC: 0
ESS TOTAL SCORE: 9
HOW LIKELY ARE YOU TO NOD OFF OR FALL ASLEEP WHEN YOU ARE A PASSENGER IN A CAR FOR AN HOUR WITHOUT A BREAK: 0
HOW LIKELY ARE YOU TO NOD OFF OR FALL ASLEEP WHILE SITTING QUIETLY AFTER LUNCH WITHOUT ALCOHOL: 3
HOW LIKELY ARE YOU TO NOD OFF OR FALL ASLEEP WHILE SITTING AND TALKING TO SOMEONE: 0

## 2022-11-17 ENCOUNTER — TELEPHONE (OUTPATIENT)
Dept: PULMONOLOGY | Age: 67
End: 2022-11-17

## 2022-11-17 NOTE — TELEPHONE ENCOUNTER
LOV 11/15/22 with Ermelinda Najjar. PLAN:  Continue Trilogy nightly with oxygen supplementation   Check KALA on trilogy and oxygen     Patient wants to talk with someone concerning the KALA, he said that she just had one about a month or so ago and wants to know why he is needing another one.

## 2022-11-17 NOTE — TELEPHONE ENCOUNTER
Spoke to patient and wants to know if has to pay for the KALA and having trouble with Trilogy and states insurance will not pay for it .  Called Vineet Weston and she is already gone for the day and will call her in the morning

## 2022-11-18 NOTE — TELEPHONE ENCOUNTER
Spoke to patient and he agrees to do the KALA on trilogy so Spoke to Judy Philip and someone will call him today to schedule overnight

## 2022-11-23 ENCOUNTER — TELEPHONE (OUTPATIENT)
Dept: FAMILY MEDICINE CLINIC | Facility: CLINIC | Age: 67
End: 2022-11-23

## 2022-11-23 NOTE — TELEPHONE ENCOUNTER
Spoke with pt today said his back is still hurting and the muscle relaxer did not help and they were too expensive. He cannot afford them again. Discussed with Dr Thea Young and he reccommended he try PT. I explained to pt that was the next step and he said \"physical therapy isn't going to help me today. I'm in pain today! Plus the Oxy I'm taking isn't helping\"  I asked him if what he was taking was oxycodone and he said yes. He said he was taking 30 mg. He repeated that it was not helping. I asked where he got that prescription and he said his pain doctor gives it to him. I told him that if the oxycodone was not helping that he needed to go to urgent care or the ER if he was in that much pain. He said he would not go. I told him that there wasn't anything stronger than the oxycodone he was taking that Dr Thea Young could prescribe for him. He declined PT and the ER or Urgent Care. Told me to have a good day and hung up.

## 2022-11-29 ENCOUNTER — OFFICE VISIT (OUTPATIENT)
Dept: FAMILY MEDICINE CLINIC | Facility: CLINIC | Age: 67
End: 2022-11-29
Payer: MEDICARE

## 2022-11-29 VITALS — BODY MASS INDEX: 46.06 KG/M2 | WEIGHT: 311 LBS | HEIGHT: 69 IN

## 2022-11-29 DIAGNOSIS — M79.10 MYALGIA: ICD-10-CM

## 2022-11-29 DIAGNOSIS — Z79.4 TYPE 2 DIABETES MELLITUS WITHOUT COMPLICATION, WITH LONG-TERM CURRENT USE OF INSULIN (HCC): ICD-10-CM

## 2022-11-29 DIAGNOSIS — M54.50 LEFT LUMBAR PAIN: Primary | ICD-10-CM

## 2022-11-29 DIAGNOSIS — E11.9 TYPE 2 DIABETES MELLITUS WITHOUT COMPLICATION, WITH LONG-TERM CURRENT USE OF INSULIN (HCC): ICD-10-CM

## 2022-11-29 DIAGNOSIS — R31.9 HEMATURIA, UNSPECIFIED TYPE: ICD-10-CM

## 2022-11-29 DIAGNOSIS — I10 ESSENTIAL HYPERTENSION, BENIGN: ICD-10-CM

## 2022-11-29 DIAGNOSIS — E83.42 HYPOMAGNESEMIA: ICD-10-CM

## 2022-11-29 DIAGNOSIS — E11.65 TYPE 2 DIABETES MELLITUS WITH HYPERGLYCEMIA, WITHOUT LONG-TERM CURRENT USE OF INSULIN (HCC): ICD-10-CM

## 2022-11-29 LAB
BILIRUBIN, URINE, POC: NEGATIVE
BLOOD URINE, POC: NEGATIVE
GLUCOSE URINE, POC: ABNORMAL
KETONES, URINE, POC: NEGATIVE
LEUKOCYTE ESTERASE, URINE, POC: NEGATIVE
NITRITE, URINE, POC: NEGATIVE
PH, URINE, POC: 6 (ref 4.6–8)
PROTEIN,URINE, POC: NEGATIVE
SPECIFIC GRAVITY, URINE, POC: 1.02 (ref 1–1.03)
URINALYSIS CLARITY, POC: CLEAR
URINALYSIS COLOR, POC: YELLOW
UROBILINOGEN, POC: NEGATIVE

## 2022-11-29 PROCEDURE — 1123F ACP DISCUSS/DSCN MKR DOCD: CPT | Performed by: FAMILY MEDICINE

## 2022-11-29 PROCEDURE — 99214 OFFICE O/P EST MOD 30 MIN: CPT | Performed by: FAMILY MEDICINE

## 2022-11-29 PROCEDURE — 3046F HEMOGLOBIN A1C LEVEL >9.0%: CPT | Performed by: FAMILY MEDICINE

## 2022-11-29 PROCEDURE — 81003 URINALYSIS AUTO W/O SCOPE: CPT | Performed by: FAMILY MEDICINE

## 2022-11-29 RX ORDER — CYCLOBENZAPRINE HCL 10 MG
10 TABLET ORAL NIGHTLY PRN
Qty: 10 TABLET | Refills: 0 | Status: SHIPPED | OUTPATIENT
Start: 2022-11-29 | End: 2022-12-09

## 2022-11-29 ASSESSMENT — PATIENT HEALTH QUESTIONNAIRE - PHQ9
SUM OF ALL RESPONSES TO PHQ QUESTIONS 1-9: 0
2. FEELING DOWN, DEPRESSED OR HOPELESS: 0
SUM OF ALL RESPONSES TO PHQ QUESTIONS 1-9: 0
SUM OF ALL RESPONSES TO PHQ9 QUESTIONS 1 & 2: 0
SUM OF ALL RESPONSES TO PHQ QUESTIONS 1-9: 0
1. LITTLE INTEREST OR PLEASURE IN DOING THINGS: 0
SUM OF ALL RESPONSES TO PHQ QUESTIONS 1-9: 0

## 2022-11-29 ASSESSMENT — ENCOUNTER SYMPTOMS
CHOKING: 0
EYE PAIN: 0
BACK PAIN: 1
APNEA: 0

## 2022-11-30 LAB
CK SERPL-CCNC: 139 U/L (ref 21–215)
EST. AVERAGE GLUCOSE BLD GHB EST-MCNC: 226 MG/DL
HBA1C MFR BLD: 9.5 % (ref 4.8–5.6)
MAGNESIUM SERPL-MCNC: 1.9 MG/DL (ref 1.8–2.4)

## 2022-11-30 NOTE — PROGRESS NOTES
[address] [place] [address]  _______________________________________  Mariam Craft MD                 [address] [place] [address],  [address]. [place], [address] [place], [place] [address]                                                                                     Phone: [phone]                                                                                    Fax: [phone]    Nata Diaz is a 74 y.o. male who is seen for evaluation of   Chief Complaint   Patient presents with    Back Pain       HPI:   Back Pain  Episode onset: pain an dproblems last few weeks with this issue, still sore across lower back area. reduced ROm , pain localized to lower left lumbar area. Pertinent negatives include no dysuria. Other  Episode onset: pt with dark urine noted lately and foul smll, check UA and muscle enzymes. Pertinent negatives include no chills or diaphoresis. Chief Complaint   Patient presents with    Back Pain         Review of Systems:    Review of Systems   Constitutional:  Negative for activity change, chills and diaphoresis. HENT: Negative. Eyes:  Negative for pain. Respiratory:  Negative for apnea and choking. Endocrine: Negative for cold intolerance, heat intolerance and polyphagia. Genitourinary:  Negative for dysuria and flank pain. Musculoskeletal:  Positive for back pain. History:  Past Medical History:   Diagnosis Date    Acquired cyst of kidney     Arthritis     generalized     Balanoposthitis     BPH (benign prostatic hypertrophy)     Chronic pain     back     Claustrophobia     Depression     Dysuria     GERD (gastroesophageal reflux disease)     managed with medication     High cholesterol     Hypertension     managed with medication     Impotence of organic origin     Obesity (BMI 30-39. 9)     Obesity (BMI 30-39. 9)     BMI 40.1    Other testicular hypofunction     Tinnitus of both ears     Type 2 diabetes mellitus ([facility] [address] type 2, adverage glucose 150-200, symptomatic is unknown     Unspecified adverse effect of anesthesia     woke up in middle of procedure x 2    Unspecified sleep apnea     CPAP compliant    Wears dentures     uppers       Past Surgical History:   Procedure Laterality Date    ANKLE FRACTURE SURGERY  5 yrs ago    right with pinning    CARPAL TUNNEL RELEASE  over 10 yrs ago    bilateral    CIRCUMCISION      ORTHOPEDIC SURGERY      L4 and 5 compression with dustin in place       Family History   Problem Relation Age of Onset    Diabetes Sister     Diabetes Brother     Diabetes Sister     Diabetes Mother     Stroke Father     Hypertension Mother     Diabetes Father     Hypertension Father     Stroke Mother        Social History     Tobacco Use    Smoking status: Former     Packs/day: 3.00     Types: Cigarettes     Quit date: 10/5/1988     Years since quittin.1    Smokeless tobacco: Never    Tobacco comments:     Quit smoking: quit    Substance Use Topics    Alcohol use: Yes       Allergies   Allergen Reactions    Adhesive Tape Hives     bandaids         Current Outpatient Medications   Medication Sig Dispense Refill    cyclobenzaprine (FLEXERIL) 10 MG tablet Take 1 tablet by mouth nightly as needed for Muscle spasms 10 tablet 0    zolpidem (AMBIEN) 10 MG tablet Take 1 tablet by mouth nightly as needed for Sleep for up to 181 days. 30 tablet 5    hydroCHLOROthiazide (HYDRODIURIL) 12.5 MG tablet Take 1 tablet by mouth daily 90 tablet 1    TRESIBA FLEXTOUCH 200 UNIT/ML SOPN Inject 160 Units into the skin every morning      Cholecalciferol (VITAMIN D3) 50 MCG ( UT) CAPS Take 1 capsule by mouth daily      VITAMIN B COMPLEX-C PO Take 1 capsule by mouth daily      Cinnamon 500 MG CAPS Take 1 capsule by mouth daily      Garlic 10 MG CAPS Take 1 capsule by mouth daily      gabapentin (NEURONTIN) 400 MG capsule Take 800 mg by mouth every evening.       naloxone 4 MG/0.1ML LIQD nasal spray  meloxicam (MOBIC) 15 MG tablet       gabapentin (NEURONTIN) 400 MG capsule Take 400 mg by mouth in the morning and at bedtime. Morning and evening      atorvastatin (LIPITOR) 20 MG tablet       azelastine (ASTELIN) 0.1 % nasal spray 1 spray by Nasal route 2 times daily Use in each nostril as directed (Patient taking differently: 1 spray by Nasal route nightly as needed for Rhinitis Use in each nostril as directed) 60 mL 1    OXYGEN 4.5L oxygen into trilogy      albuterol (PROVENTIL) (2.5 MG/3ML) 0.083% nebulizer solution Inhale 2.5 mg into the lungs every 6 hours as needed      amLODIPine (NORVASC) 5 MG tablet Take 5 mg by mouth      famotidine (PEPCID) 40 MG tablet Take 40 mg by mouth daily      glipiZIDE (GLUCOTROL) 10 MG tablet Take 10 mg by mouth daily      insulin lispro (HUMALOG KWIKPEN) 200 UNIT/ML SOPN pen Inject into the skin See Admin Instructions 20 units breakfast 20 units lunch 40 units supper      lisinopril (PRINIVIL;ZESTRIL) 20 MG tablet Take 20 mg by mouth daily      metFORMIN (GLUCOPHAGE-XR) 500 MG extended release tablet Take 500 mg by mouth 2 at lunch and 2 at dinner      naloxegol (MOVANTIK) 25 MG TABS tablet Take by mouth every morning (before breakfast)      oxyCODONE (OXY-IR) 30 MG immediate release tablet Take 30 mg by mouth every 6 hours as needed.  pantoprazole (PROTONIX) 40 MG tablet Take 40 mg by mouth 2 times daily      Sennosides 8.6 MG CAPS Take 1 capsule by mouth daily      tamsulosin (FLOMAX) 0.4 MG capsule TAKE 1 CAPSULE BY MOUTH DAILY      Vitamin E 400 units TABS Take 1 tablet by mouth       No current facility-administered medications for this visit. Vitals:    Ht 5' 9\" (1.753 m)   Wt (!) 311 lb (141.1 kg)   BMI 45.93 kg/m²     Physical Exam:  Physical Exam  Vitals and nursing note reviewed. Constitutional:       Appearance: Normal appearance. He is not ill-appearing or diaphoretic. HENT:      Head: Normocephalic and atraumatic.       Nose: Nose normal.   Eyes:      Pupils: Pupils are equal, round, and reactive to light. Cardiovascular:      Rate and Rhythm: Normal rate and regular rhythm. Pulses: Normal pulses. Heart sounds: Normal heart sounds. Pulmonary:      Effort: Pulmonary effort is normal.      Breath sounds: Normal breath sounds. Musculoskeletal:         General: Tenderness present. No swelling. Cervical back: Normal range of motion and neck supple. Comments: Tender reduced ROM , sore to touch and pain with flexion and extension     Skin:     General: Skin is warm and dry. Findings: No erythema or rash. Neurological:      General: No focal deficit present. Mental Status: He is alert and oriented to person, place, and time. Mental status is at baseline. Psychiatric:         Mood and Affect: Mood normal.     Assessment/Plan:     ICD-10-CM    1. Left lumbar pain  M54.50 cyclobenzaprine (FLEXERIL) 10 MG tablet      2. Myalgia  M79.10 CK     Myoglobin, Blood     Myoglobin, Blood     CK      3. Hematuria, unspecified type  R31.9 AMB POC URINALYSIS DIP STICK AUTO W/O MICRO      4. Essential hypertension, benign  I10       5. Type 2 diabetes mellitus without complication, with long-term current use of insulin ([facility])  E11.9 Hemoglobin A1C    Z79.4 Hemoglobin A1C      6. Hypomagnesemia  E83.42 Magnesium     Magnesium      7. Type 2 diabetes mellitus with hyperglycemia, without long-term current use of insulin ([facility])  E11.65       Lumbar pain, left side, reduced ROM from the pain.  Change muscle relaxers, add labs for muscle pain and also for the dark urine problems  Meds sent  Labs sent  Encourage diet and exercise   Encourage weight loss,  Encourage home sugar monitoring  Call if problems  Recheck 3 months     Richa Hughes MD

## 2022-12-13 ENCOUNTER — TELEPHONE (OUTPATIENT)
Dept: FAMILY MEDICINE CLINIC | Facility: CLINIC | Age: 67
End: 2022-12-13

## 2022-12-13 ENCOUNTER — TELEPHONE (OUTPATIENT)
Dept: ORTHOPEDIC SURGERY | Age: 67
End: 2022-12-13

## 2022-12-13 DIAGNOSIS — M54.50 LEFT LUMBAR PAIN: Primary | ICD-10-CM

## 2022-12-13 DIAGNOSIS — M54.50 LUMBAR PAIN: ICD-10-CM

## 2022-12-13 NOTE — TELEPHONE ENCOUNTER
Pt called said his back is still hurting. He is still doing PT and it was helping at first but does not seem to be progressing any more. He wants to know what he should do now.   Please advise

## 2022-12-13 NOTE — TELEPHONE ENCOUNTER
Pt is being referred by Dr Ilya Moreland for Lumbar pain, he had L4-5 fusion with SALVATORE  He is in Pain Mgmt Dr Piter Pyle  No MRI  can I schedule

## 2022-12-19 DIAGNOSIS — M54.16 LUMBAR BACK PAIN WITH RADICULOPATHY AFFECTING LOWER EXTREMITY: Primary | ICD-10-CM

## 2022-12-21 ENCOUNTER — OFFICE VISIT (OUTPATIENT)
Dept: ENT CLINIC | Age: 67
End: 2022-12-21
Payer: MEDICARE

## 2022-12-21 VITALS — BODY MASS INDEX: 45.91 KG/M2 | HEIGHT: 69 IN | WEIGHT: 310 LBS

## 2022-12-21 DIAGNOSIS — H90.3 SENSORINEURAL HEARING LOSS (SNHL) OF BOTH EARS: ICD-10-CM

## 2022-12-21 DIAGNOSIS — H90.3 SENSORINEURAL HEARING LOSS, BILATERAL: Primary | ICD-10-CM

## 2022-12-21 DIAGNOSIS — H93.13 BILATERAL TINNITUS: Primary | ICD-10-CM

## 2022-12-21 PROCEDURE — 1123F ACP DISCUSS/DSCN MKR DOCD: CPT | Performed by: OTOLARYNGOLOGY

## 2022-12-21 PROCEDURE — 92557 COMPREHENSIVE HEARING TEST: CPT | Performed by: AUDIOLOGIST

## 2022-12-21 PROCEDURE — 99204 OFFICE O/P NEW MOD 45 MIN: CPT | Performed by: OTOLARYNGOLOGY

## 2022-12-21 PROCEDURE — 92567 TYMPANOMETRY: CPT | Performed by: AUDIOLOGIST

## 2022-12-21 ASSESSMENT — ENCOUNTER SYMPTOMS
ABDOMINAL PAIN: 0
SHORTNESS OF BREATH: 0
SORE THROAT: 0

## 2022-12-21 NOTE — PROGRESS NOTES
AUDIOLOGY EVALUATION    Azalia Kirkland had Tympanometry and Audiometry performed today. The patient reports hearing loss and tinnitus. Results as follows:    Tympanometry    Type A -  bilaterally    Audiometry    Test Performed - Comprehensive Audiogram    Type of Loss - Right Ear: abnormal hearing: degree of loss is normal to moderately severe sensorineural hearing loss                           Left Ear: abnormal hearing: degree of loss is normal to moderately severe sensorineural hearing loss     SRT   Measurement Right Ear Left Ear   Value 25 20   Unit dB dB     Discrimination  Measurement Right Ear Left Ear   Value 88% 84%   Unit dB dB     Recommend  Binaural amplification and annual audios    [address] [facility]  Audiologist

## 2022-12-21 NOTE — PROGRESS NOTES
Chief Complaint   Patient presents with    Hearing Problem       HPI:  Enrico Barth is a 74 y.o. male seen today in initial consultation for his ears. He c/o gradual change in hearing in both ears over the past several years. The ringing has gotten worse as well and has become more bothersome. No acute or sudden change in hearing. He is having more trouble understanding speech as well and listening to the TV louder than normal.  He cleans his ears with Q-tips daily and has noted some cerumen in both ears. No recent audiograms. He has no previous otologic history. He reports significant noise exposure from loud concerts and working in an [employer] when he was younger. Past Medical History, Past Surgical History, Family history, Social History, and Medications were all reviewed with the patient today and updated as necessary. Allergies   Allergen Reactions    Adhesive Tape Hives     bandaids       Patient Active Problem List   Diagnosis    Spinal stenosis of lumbar region with neurogenic claudication    Hypoxemia    Essential hypertension, benign    Spinal stenosis, lumbar region, with neurogenic claudication    Chest pain    Obesity (BMI 30-39. 9)    High cholesterol    COPD (chronic obstructive pulmonary disease) ([facility])    Hypocalcemia    Type 2 diabetes mellitus with hyperglycemia, with long-term current use of insulin ([facility])    LOPEZ (dyspnea on exertion)    Class 3 severe obesity due to excess calories with serious comorbidity and body mass index (BMI) of 45.0 to 49.9 in adult [facility])    Chronic low back pain    GERD (gastroesophageal reflux disease)    MISTI treated with BiPAP    Type 2 diabetes mellitus with hyperglycemia    Flu     Current Outpatient Medications   Medication Sig    zolpidem (AMBIEN) 10 MG tablet Take 1 tablet by mouth nightly as needed for Sleep for up to 181 days.     hydroCHLOROthiazide (HYDRODIURIL) 12.5 MG tablet Take 1 tablet by mouth daily    TRESIBA FLEXTOUCH 200 UNIT/ML SOPN Inject 160 Units into the skin every morning    Cholecalciferol (VITAMIN D3) 50 MCG (2000 UT) CAPS Take 1 capsule by mouth daily    VITAMIN B COMPLEX-C PO Take 1 capsule by mouth daily    Cinnamon 500 MG CAPS Take 1 capsule by mouth daily    Garlic 10 MG CAPS Take 1 capsule by mouth daily    gabapentin (NEURONTIN) 400 MG capsule Take 800 mg by mouth every evening. naloxone 4 MG/0.1ML LIQD nasal spray     meloxicam (MOBIC) 15 MG tablet     gabapentin (NEURONTIN) 400 MG capsule Take 400 mg by mouth in the morning and at bedtime. Morning and evening    atorvastatin (LIPITOR) 20 MG tablet     azelastine (ASTELIN) 0.1 % nasal spray 1 spray by Nasal route 2 times daily Use in each nostril as directed (Patient taking differently: 1 spray by Nasal route nightly as needed for Rhinitis Use in each nostril as directed)    OXYGEN 4.5L oxygen into trilogy    albuterol (PROVENTIL) (2.5 MG/3ML) 0.083% nebulizer solution Inhale 2.5 mg into the lungs every 6 hours as needed    amLODIPine (NORVASC) 5 MG tablet Take 5 mg by mouth    famotidine (PEPCID) 40 MG tablet Take 40 mg by mouth daily    glipiZIDE (GLUCOTROL) 10 MG tablet Take 10 mg by mouth daily    insulin lispro (HUMALOG KWIKPEN) 200 UNIT/ML SOPN pen Inject into the skin See Admin Instructions 20 units breakfast 20 units lunch 40 units supper    lisinopril (PRINIVIL;ZESTRIL) 20 MG tablet Take 20 mg by mouth daily    metFORMIN (GLUCOPHAGE-XR) 500 MG extended release tablet Take 500 mg by mouth 2 at lunch and 2 at dinner    naloxegol (MOVANTIK) 25 MG TABS tablet Take by mouth every morning (before breakfast)    oxyCODONE (OXY-IR) 30 MG immediate release tablet Take 30 mg by mouth every 6 hours as needed.     pantoprazole (PROTONIX) 40 MG tablet Take 40 mg by mouth 2 times daily    Sennosides 8.6 MG CAPS Take 1 capsule by mouth daily    tamsulosin (FLOMAX) 0.4 MG capsule TAKE 1 CAPSULE BY MOUTH DAILY    Vitamin E 400 units TABS Take 1 tablet by mouth     No current facility-administered medications for this visit. Past Medical History:   Diagnosis Date    Acquired cyst of kidney     Arthritis     generalized     Balanoposthitis     BPH (benign prostatic hypertrophy)     Chronic pain     back     Claustrophobia     Depression     Dysuria     GERD (gastroesophageal reflux disease)     managed with medication     High cholesterol     Hypertension     managed with medication     Impotence of organic origin     Obesity (BMI 30-39. 9)     Obesity (BMI 30-39. 9)     BMI 40.1    Other testicular hypofunction     Tinnitus of both ears     Type 2 diabetes mellitus (HCC)     type 2, adverage glucose 150-200, symptomatic is unknown     Unspecified adverse effect of anesthesia     woke up in middle of procedure x 2    Unspecified sleep apnea     CPAP compliant    Wears dentures     uppers     Social History     Tobacco Use    Smoking status: Former     Packs/day: 3.00     Types: Cigarettes     Quit date: 10/5/1988     Years since quittin.2    Smokeless tobacco: Never    Tobacco comments:     Quit smoking: quit    Substance Use Topics    Alcohol use: Yes     Past Surgical History:   Procedure Laterality Date    ANKLE FRACTURE SURGERY  5 yrs ago    right with pinning    CARPAL TUNNEL RELEASE  over 10 yrs ago    bilateral    CIRCUMCISION      ORTHOPEDIC SURGERY      L4 and 5 compression with dustin in place     Family History   Problem Relation Age of Onset    Diabetes Sister     Diabetes Brother     Diabetes Sister     Diabetes Mother     Stroke Father     Hypertension Mother     Diabetes Father     Hypertension Father     Stroke Mother         ROS:    Review of Systems   Constitutional:  Negative for fever. HENT:  Positive for hearing loss and tinnitus. Negative for ear discharge, ear pain and sore throat. Eyes:  Negative for visual disturbance. Respiratory:  Negative for shortness of breath. Cardiovascular:  Negative for chest pain.    Gastrointestinal:  Negative for abdominal pain. Skin:  Negative for rash. Neurological:  Negative for dizziness and headaches. Hematological:  Negative for adenopathy. Psychiatric/Behavioral:  Negative for agitation. PHYSICAL EXAM:    Ht 5' 9\" (1.753 m)   Wt (!) 310 lb (140.6 kg)   BMI 45.78 kg/m²     General: NAD, well-appearing  Neuro: No gross neuro deficits. CN's II-XII intact. No facial weakness. Eyes: EOMI. Pupils reactive. No periorbital edema/ecchymosis. No nystagmus. Skin: No facial erythema, rashes or concerning lesions. Nose: No external deviations or saddling. Intranasally, septum is midline without perforations, nasal mucosa appears healthy with no erythema, mucopurulence, or polyps. Mouth: Moist mucus membranes, normal tongue/palate mobility, no concerning mucosal lesions. Oropharynx clear with no erythema/exudate, no tonsillar hypertrophy. Ears: Normal appearing auricles, no hematomas. EACs clear with no cerumen impaction, healthy canal skin, TM's intact with no perforations or retraction pockets. No middle ear effusions. Neck: Soft, supple, no palpable lateral neck masses. No palpable parotid or submandibular masses. No thyromegaly or palpable thyroid nodules. No surgical scars. Lymphatics: No palpable cervical LAD. Resp: No audible stridor or wheezing. CV: No murmurs, no JVD. Extremities: No clubbing or cyanosis. ASSESSMENT and PLAN      ICD-10-CM    1. Bilateral tinnitus  H93.13       2. Sensorineural hearing loss (SNHL) of both ears  H90.3         His ears were clear and healthy on my exam today and I obtained an audiogram which revealed normal sloping to moderate high-frequency SNHL bilaterally. He had good speech discrimination scores and is an excellent candidate for hearing aids, and he will consider this option.   I discussed with him that his tinnitus is likely related to this high-frequency SNHL and I reviewed with him some conservative masking strategies which may help him better cope with the tinnitus. I reassured him of my findings and we will see him back as needed.     Ramón Jeronimo MD  12/21/2022    Electronically signed by Ramón Jeronimo MD on 12/21/2022 at 5:27 PM

## 2022-12-23 ENCOUNTER — TELEPHONE (OUTPATIENT)
Dept: SLEEP MEDICINE | Age: 67
End: 2022-12-23

## 2022-12-23 NOTE — TELEPHONE ENCOUNTER
Patient with hypoxemia on trilogy and 4L.  [employer] needs to adjust trilogy settings and repeat DENA Velazquez - CNP

## 2022-12-27 NOTE — TELEPHONE ENCOUNTER
PER Meghann Haynes at [facility] had to switch from Trilogy to Bipap due to insurance denial for Trilogy and will send out new royal on bipap today to patient.

## 2023-01-12 ENCOUNTER — OFFICE VISIT (OUTPATIENT)
Dept: FAMILY MEDICINE CLINIC | Facility: CLINIC | Age: 68
End: 2023-01-12
Payer: MEDICARE

## 2023-01-12 VITALS
WEIGHT: 298 LBS | SYSTOLIC BLOOD PRESSURE: 130 MMHG | HEIGHT: 69 IN | BODY MASS INDEX: 44.14 KG/M2 | DIASTOLIC BLOOD PRESSURE: 70 MMHG

## 2023-01-12 DIAGNOSIS — E66.01 OBESITY, CLASS III, BMI 40-49.9 (MORBID OBESITY) (HCC): ICD-10-CM

## 2023-01-12 DIAGNOSIS — F41.9 ANXIETY: ICD-10-CM

## 2023-01-12 DIAGNOSIS — K21.9 GASTROESOPHAGEAL REFLUX DISEASE, UNSPECIFIED WHETHER ESOPHAGITIS PRESENT: ICD-10-CM

## 2023-01-12 DIAGNOSIS — E83.42 HYPOMAGNESEMIA: ICD-10-CM

## 2023-01-12 DIAGNOSIS — G47.33 OSA TREATED WITH BIPAP: ICD-10-CM

## 2023-01-12 DIAGNOSIS — J44.0 CHRONIC OBSTRUCTIVE PULMONARY DISEASE WITH ACUTE LOWER RESPIRATORY INFECTION (HCC): ICD-10-CM

## 2023-01-12 DIAGNOSIS — Z79.4 TYPE 2 DIABETES MELLITUS WITHOUT COMPLICATION, WITH LONG-TERM CURRENT USE OF INSULIN (HCC): Primary | ICD-10-CM

## 2023-01-12 DIAGNOSIS — E78.00 HIGH CHOLESTEROL: ICD-10-CM

## 2023-01-12 DIAGNOSIS — E11.9 TYPE 2 DIABETES MELLITUS WITHOUT COMPLICATION, WITH LONG-TERM CURRENT USE OF INSULIN (HCC): Primary | ICD-10-CM

## 2023-01-12 PROBLEM — E11.65 TYPE 2 DIABETES MELLITUS WITH HYPERGLYCEMIA (HCC): Status: RESOLVED | Noted: 2022-06-27 | Resolved: 2023-01-12

## 2023-01-12 PROCEDURE — 3078F DIAST BP <80 MM HG: CPT | Performed by: FAMILY MEDICINE

## 2023-01-12 PROCEDURE — 3075F SYST BP GE 130 - 139MM HG: CPT | Performed by: FAMILY MEDICINE

## 2023-01-12 PROCEDURE — 99214 OFFICE O/P EST MOD 30 MIN: CPT | Performed by: FAMILY MEDICINE

## 2023-01-12 PROCEDURE — 1123F ACP DISCUSS/DSCN MKR DOCD: CPT | Performed by: FAMILY MEDICINE

## 2023-01-12 RX ORDER — DIAZEPAM 5 MG/1
5 TABLET ORAL EVERY 8 HOURS PRN
Qty: 3 TABLET | Refills: 0 | Status: SHIPPED | OUTPATIENT
Start: 2023-01-12 | End: 2023-01-13

## 2023-01-12 RX ORDER — TIZANIDINE 4 MG/1
TABLET ORAL
COMMUNITY
Start: 2022-12-09

## 2023-01-12 ASSESSMENT — PATIENT HEALTH QUESTIONNAIRE - PHQ9
1. LITTLE INTEREST OR PLEASURE IN DOING THINGS: 0
SUM OF ALL RESPONSES TO PHQ QUESTIONS 1-9: 0
SUM OF ALL RESPONSES TO PHQ9 QUESTIONS 1 & 2: 0
2. FEELING DOWN, DEPRESSED OR HOPELESS: 0
SUM OF ALL RESPONSES TO PHQ QUESTIONS 1-9: 0

## 2023-01-12 ASSESSMENT — ENCOUNTER SYMPTOMS
BLOOD IN STOOL: 0
BACK PAIN: 1
ABDOMINAL PAIN: 0
EYE ITCHING: 0
SINUS PAIN: 0
BLURRED VISION: 0
FACIAL SWELLING: 0
SINUS PRESSURE: 0
COUGH: 0
EYE DISCHARGE: 0
ANAL BLEEDING: 0
EYE REDNESS: 0
ABDOMINAL DISTENTION: 0
APNEA: 0
CHEST TIGHTNESS: 0
EYE PAIN: 0
RHINORRHEA: 0

## 2023-01-12 ASSESSMENT — COPD QUESTIONNAIRES: COPD: 1

## 2023-01-12 NOTE — PROGRESS NOTES
[address] [place] [address]  _______________________________________  Anuja Oliva MD                 [address] [place] [address],  [address]. [place], [address] [place] [address]                     Charly Springer [address]                                                                                    Phone: [phone]                                                                                    Fax: [phone]    Juliette Courser is a 74 y.o. male who is seen for evaluation of   Chief Complaint   Patient presents with    Back Pain     Ongoing, did PT, no relief    Diabetes    Gastroesophageal Reflux    COPD    Cholesterol Problem    Obesity       HPI:   Back Pain  This is a chronic problem. Episode frequency: chronic lower back pain, left side worse, pain radiates at times, has tried PT and NSAIDS and muscle relaxers with minimal improvement. Pertinent negatives include no abdominal pain, chest pain, dysuria, fever or headaches. Diabetes  He presents for his follow-up diabetic visit. He has type 2 diabetes mellitus. Onset time: sugar avg still low 200s most times, is eating poorly last few weeks, Pertinent negatives for hypoglycemia include no confusion, dizziness, headaches, nervousness/anxiousness or pallor. Pertinent negatives for diabetes include no blurred vision, no chest pain, no fatigue and no foot paresthesias. Gastroesophageal Reflux  He reports no abdominal pain, no chest pain or no coughing. Chronicity: on meds, need refills and labs, no side effect noted. Pertinent negatives include no fatigue. COPD  There is no cough. Chronicity: stable on meds, no recent flare noted, need reflls. Pertinent negatives include no appetite change, chest pain, ear pain, fever, headaches, myalgias or rhinorrhea.     Chief Complaint   Patient presents with    Back Pain     Ongoing, did PT, no relief    Diabetes    Gastroesophageal Reflux    COPD    Cholesterol Problem    Obesity         Review of Systems:    Review of Systems   Constitutional:  Negative for activity change, appetite change, chills, diaphoresis, fatigue and fever. HENT:  Negative for congestion, ear discharge, ear pain, facial swelling, hearing loss, mouth sores, rhinorrhea, sinus pressure and sinus pain. Eyes:  Negative for blurred vision, pain, discharge, redness and itching. Respiratory:  Negative for apnea, cough and chest tightness. Cardiovascular:  Negative for chest pain and leg swelling. Gastrointestinal:  Negative for abdominal distention, abdominal pain, anal bleeding and blood in stool. Endocrine: Negative for cold intolerance and heat intolerance. Genitourinary:  Negative for difficulty urinating, dysuria, enuresis, flank pain, frequency and hematuria. Musculoskeletal:  Positive for arthralgias, back pain and gait problem. Negative for joint swelling and myalgias. Skin:  Negative for pallor and rash. Neurological:  Negative for dizziness, facial asymmetry, light-headedness and headaches. Psychiatric/Behavioral:  Negative for agitation, behavioral problems, confusion and sleep disturbance. The patient is not nervous/anxious. History:  Past Medical History:   Diagnosis Date    Acquired cyst of kidney     Arthritis     generalized     Balanoposthitis     BPH (benign prostatic hypertrophy)     Chronic pain     back     Claustrophobia     Depression     Dysuria     GERD (gastroesophageal reflux disease)     managed with medication     High cholesterol     Hypertension     managed with medication     Impotence of organic origin     Obesity (BMI 30-39. 9)     Obesity (BMI 30-39. 9)     BMI 40.1    Other testicular hypofunction     Tinnitus of both ears     Type 2 diabetes mellitus (HCC)     type 2, adverage glucose 150-200, symptomatic is unknown     Unspecified adverse effect of anesthesia     woke up in middle of procedure x 2    Unspecified sleep apnea     CPAP compliant    Wears dentures     uppers Past Surgical History:   Procedure Laterality Date    ANKLE FRACTURE SURGERY  5 yrs ago    right with pinning    CARPAL TUNNEL RELEASE  over 10 yrs ago    bilateral    CIRCUMCISION      ORTHOPEDIC SURGERY      L4 and 5 compression with dustin in place       Family History   Problem Relation Age of Onset    Diabetes Sister     Diabetes Brother     Diabetes Sister     Diabetes Mother     Stroke Father     Hypertension Mother     Diabetes Father     Hypertension Father     Stroke Mother        Social History     Tobacco Use    Smoking status: Former     Packs/day: 3.00     Types: Cigarettes     Quit date: 10/5/1988     Years since quittin.2    Smokeless tobacco: Never    Tobacco comments:     Quit smoking: quit    Substance Use Topics    Alcohol use: Yes       Allergies   Allergen Reactions    Adhesive Tape Hives     bandaids         Current Outpatient Medications   Medication Sig Dispense Refill    tiZANidine (ZANAFLEX) 4 MG tablet TAKE 2 TABLETS BY MOUTH AT BEDTIME AS NEEDED      diazePAM (VALIUM) 5 MG tablet Take 1 tablet by mouth every 8 hours as needed for Anxiety or Sleep for up to 1 day. Max Daily Amount: 15 mg 3 tablet 0    zolpidem (AMBIEN) 10 MG tablet Take 1 tablet by mouth nightly as needed for Sleep for up to 181 days. 30 tablet 5    hydroCHLOROthiazide (HYDRODIURIL) 12.5 MG tablet Take 1 tablet by mouth daily 90 tablet 1    TRESIBA FLEXTOUCH 200 UNIT/ML SOPN Inject 160 Units into the skin every morning      Cholecalciferol (VITAMIN D3) 50 MCG ( UT) CAPS Take 1 capsule by mouth daily      VITAMIN B COMPLEX-C PO Take 1 capsule by mouth daily      Cinnamon 500 MG CAPS Take 1 capsule by mouth daily      Garlic 10 MG CAPS Take 1 capsule by mouth daily      gabapentin (NEURONTIN) 400 MG capsule Take 800 mg by mouth every evening.       naloxone 4 MG/0.1ML LIQD nasal spray       meloxicam (MOBIC) 15 MG tablet       gabapentin (NEURONTIN) 400 MG capsule Take 400 mg by mouth in the morning and at bedtime. Morning and evening      atorvastatin (LIPITOR) 20 MG tablet       azelastine (ASTELIN) 0.1 % nasal spray 1 spray by Nasal route 2 times daily Use in each nostril as directed (Patient taking differently: 1 spray by Nasal route nightly as needed for Rhinitis Use in each nostril as directed) 60 mL 1    OXYGEN 4.5L oxygen into trilogy      albuterol (PROVENTIL) (2.5 MG/3ML) 0.083% nebulizer solution Inhale 2.5 mg into the lungs every 6 hours as needed      amLODIPine (NORVASC) 5 MG tablet Take 5 mg by mouth      famotidine (PEPCID) 40 MG tablet Take 40 mg by mouth daily      glipiZIDE (GLUCOTROL) 10 MG tablet Take 10 mg by mouth daily      insulin lispro (HUMALOG KWIKPEN) 200 UNIT/ML SOPN pen Inject into the skin See Admin Instructions 20 units breakfast 20 units lunch 40 units supper      lisinopril (PRINIVIL;ZESTRIL) 20 MG tablet Take 20 mg by mouth daily      metFORMIN (GLUCOPHAGE-XR) 500 MG extended release tablet Take 500 mg by mouth 2 at lunch and 2 at dinner      naloxegol (MOVANTIK) 25 MG TABS tablet Take by mouth every morning (before breakfast)      oxyCODONE (OXY-IR) 30 MG immediate release tablet Take 30 mg by mouth every 6 hours as needed.  pantoprazole (PROTONIX) 40 MG tablet Take 40 mg by mouth 2 times daily      Sennosides 8.6 MG CAPS Take 1 capsule by mouth daily      tamsulosin (FLOMAX) 0.4 MG capsule TAKE 1 CAPSULE BY MOUTH DAILY      Vitamin E 400 units TABS Take 1 tablet by mouth       No current facility-administered medications for this visit. Vitals:    /70 (Site: Right Upper Arm, Position: Sitting, Cuff Size: Large Adult)   Ht 5' 9\" (1.753 m)   Wt 298 lb (135.2 kg)   BMI 44.01 kg/m²     Physical Exam:  Physical Exam  Vitals and nursing note reviewed. Constitutional:       Appearance: Normal appearance. He is not ill-appearing or diaphoretic. HENT:      Head: Normocephalic and atraumatic.       Nose: Nose normal.   Eyes: Pupils: Pupils are equal, round, and reactive to light. Cardiovascular:      Rate and Rhythm: Normal rate and regular rhythm. Pulses: Normal pulses. Heart sounds: Normal heart sounds. Pulmonary:      Effort: Pulmonary effort is normal.      Breath sounds: Normal breath sounds. Musculoskeletal:         General: Tenderness present. No swelling. Cervical back: Normal range of motion and neck supple. Comments: Pain to palptation left lower lumbar spine, reduced ROM secondary to pain   Skin:     General: Skin is warm and dry. Findings: No erythema or rash. Neurological:      General: No focal deficit present. Mental Status: He is alert and oriented to person, place, and time. Mental status is at baseline. Psychiatric:         Mood and Affect: Mood normal.     Assessment/Plan:     ICD-10-CM    1. Type 2 diabetes mellitus without complication, with long-term current use of insulin ([facility])  E11.9     Z79.4       2. Obesity, Class III, BMI 40-49.9 (morbid obesity) ([facility])  E66.01       3. Chronic obstructive pulmonary disease with acute lower respiratory infection ([facility])  J44.0       4. Hypomagnesemia  E83.42       5. Gastroesophageal reflux disease, unspecified whether esophagitis present  K21.9       6. High cholesterol  E78.00       7. MISTI treated with BiPAP  G47.33       8. Anxiety  F41.9 diazePAM (VALIUM) 5 MG tablet          Pain in back still present but is improved some, still with muscle spasms and tightness, not resolving, is scheduled for MRI but concerned about clostrophobia. Will send rx for valium prn use for that test only.   Meds sent  Labs reviewed from last time  Encourage diet and exercise   Encourage weight loss,  Encourage home sugar monitoring  Call if problems  Recheck 3 months   Salina Joy MD

## 2023-01-16 ENCOUNTER — TELEPHONE (OUTPATIENT)
Dept: SLEEP MEDICINE | Age: 68
End: 2023-01-16

## 2023-01-16 ENCOUNTER — CLINICAL DOCUMENTATION (OUTPATIENT)
Dept: SLEEP MEDICINE | Age: 68
End: 2023-01-16

## 2023-01-16 NOTE — TELEPHONE ENCOUNTER
Spoke to patient and he feels like not getting enough air in at night with his IVAPS pressure. Spoke to Dr. Sujata Mcdonough and adjusting his pressure to EPAP from 6 to 8cm h20 and target pressure down from 15 to 12 and make appt for 6 weeks out and rescheduled appt to  3/1/22 with Myesha Hinojosa and he voices understanding.

## 2023-01-16 NOTE — PROGRESS NOTES
Patient called in complaining of shortness of breath having started on iVAPS, download reviewed and showed Target alveolar ventilation of 6.4 but actually getting almost 10 L/min ventilation, backup rate set at 15, recommend dropping rate to 11, and slightly increasing EPAP to 8 cm H2O empirically, recent overnight oximetry reviewed and showed fairly adequate oxygenation with supplemental O2 4 L into the circuit, PFTs reviewed and showed primarily restrictive defect consistent with obesity, patient does have a history of opioid use. We will check a download 2 to 3 weeks after change. Patient's most recent echocardiogram 2018 reviewed shows normal EF and no evidence concerning for possible pulmonary hypertension. We will have him follow-up in 1 to 2 months with an MD, and if not having satisfactory results may consider in lab titration at that time.

## 2023-01-20 ENCOUNTER — TELEPHONE (OUTPATIENT)
Dept: SLEEP MEDICINE | Age: 68
End: 2023-01-20

## 2023-01-20 NOTE — TELEPHONE ENCOUNTER
----- Message from Bob Escamilla MD sent at 1/13/2023  8:36 AM EST -----  The overnight study is normal on bipap and 4 LPM. Please let the patient know.     Bob Escamilla MD

## 2023-01-24 ENCOUNTER — OFFICE VISIT (OUTPATIENT)
Dept: PULMONOLOGY | Age: 68
End: 2023-01-24
Payer: MEDICARE

## 2023-01-24 ENCOUNTER — HOSPITAL ENCOUNTER (OUTPATIENT)
Dept: GENERAL RADIOLOGY | Age: 68
Discharge: HOME | End: 2023-01-27
Payer: MEDICARE

## 2023-01-24 VITALS
HEART RATE: 101 BPM | DIASTOLIC BLOOD PRESSURE: 80 MMHG | SYSTOLIC BLOOD PRESSURE: 140 MMHG | RESPIRATION RATE: 20 BRPM | HEIGHT: 70 IN | BODY MASS INDEX: 44.95 KG/M2 | OXYGEN SATURATION: 94 % | TEMPERATURE: 98 F | WEIGHT: 314 LBS

## 2023-01-24 DIAGNOSIS — J44.0 CHRONIC OBSTRUCTIVE PULMONARY DISEASE WITH ACUTE LOWER RESPIRATORY INFECTION (HCC): ICD-10-CM

## 2023-01-24 DIAGNOSIS — R60.0 BILATERAL LEG EDEMA: ICD-10-CM

## 2023-01-24 DIAGNOSIS — G47.33 OSA TREATED WITH BIPAP: ICD-10-CM

## 2023-01-24 DIAGNOSIS — J44.0 CHRONIC OBSTRUCTIVE PULMONARY DISEASE WITH ACUTE LOWER RESPIRATORY INFECTION (HCC): Primary | ICD-10-CM

## 2023-01-24 PROCEDURE — 3077F SYST BP >= 140 MM HG: CPT | Performed by: INTERNAL MEDICINE

## 2023-01-24 PROCEDURE — 3079F DIAST BP 80-89 MM HG: CPT | Performed by: INTERNAL MEDICINE

## 2023-01-24 PROCEDURE — 99214 OFFICE O/P EST MOD 30 MIN: CPT | Performed by: INTERNAL MEDICINE

## 2023-01-24 PROCEDURE — 1123F ACP DISCUSS/DSCN MKR DOCD: CPT | Performed by: INTERNAL MEDICINE

## 2023-01-24 PROCEDURE — 71046 X-RAY EXAM CHEST 2 VIEWS: CPT

## 2023-01-24 RX ORDER — HYDROCHLOROTHIAZIDE 25 MG/1
25 TABLET ORAL EVERY MORNING
Qty: 30 TABLET | Refills: 5 | Status: SHIPPED | OUTPATIENT
Start: 2023-01-24

## 2023-01-24 NOTE — PROGRESS NOTES
Name:  Laxmi Sterling  YOB: 1955   MRN: [id number]      Office Visit: 1/24/2023        ASSESSMENT AND PLAN:  (Medical Decision Making)    Impression: 53-year-old male with history of smoking, mild COPD, OHS/MISTI on BiPAP plus 4 liters O2 at night only. 1. Chronic obstructive pulmonary disease with acute lower respiratory infection ([address]  Had some increased shortness of breath and orthopnea. I suspect this is potentially volume overload. We will recheck his chest x-ray. I will get complete PFTs when he returns. He has not had any benefit to inhalers in the past, but I will try Stiolto with him and asked him to try albuterol as needed for the symptoms just as a therapeutic trial.  Pending his chest x-ray, cardiac evaluation and response to HCTZ and after review of his PFTs we can consider repeat additional evaluation such as a CT scan of the chest.  - XR CHEST (2 VW); Future    2. MISTI treated with BiPAP  Continue BiPAP with 4 L O2 bleed in. He has pending follow-up with the sleep clinic. 3. Bilateral leg edema  Increase HCTZ to 25 mg daily and BMP in 1 to 2 weeks. Recommend cardiology evaluation given some persistent lower extremity edema, tightness and recent orthopnea. - hydroCHLOROthiazide (HYDRODIURIL) 25 MG tablet; Take 1 tablet by mouth every morning  Dispense: 30 tablet; Refill: 5  - Basic Metabolic Panel; Future  - Ambulatory referral to Cardiology  - XR CHEST (2 VW); Future  Orders Placed This Encounter   Medications    hydroCHLOROthiazide (HYDRODIURIL) 25 MG tablet     Sig: Take 1 tablet by mouth every morning     Dispense:  30 tablet     Refill:  5     No orders of the defined types were placed in this encounter.     Follow-up and Dispositions    Return in about 3 months (around 4/24/2023) for Dr. Keli Carroll or BIRGIT Herman, With CPFTs, CXR on way out, cardiology referral .       Kyle Mane MD    No specialty comments available.  _________________________________________________________________________    HISTORY OF PRESENT ILLNESS:    Mr. Suzan Hyde is a 74 y.o. male who is seen at [facility] for  COPD  72-year-old male history of diabetes, morbid obesity, sleep apnea on BiPAP who presents with several years of progressive shortness of breath. During his last visit he was off HCTZ and this was restarted due to increasing lower extremity edema. Since his last visit and even over the last couple weeks since his primary care provider visit he has gained 10 to 15 pounds and noticed increased lower extremity edema with tightness in his feet. Over the last week he has had some increased shortness of breath and felt even episodes of orthopnea with laying flat. He is never noticed any improvement in symptoms with any inhalers and stopped using Trelegy many months ago. He has not attempted to use the albuterol for these increased symptoms as its never helped before. He denies any fevers, chills, night sweats weight loss. He denies any wheezing. He was referred for an NIV device at his last visit, but his co-pay was too high to afford and he is back on BiPAP with 4 L O2 bleed in. He has pending sleep evaluation pending. REVIEW OF SYSTEMS: 10 point review of systems is negative except as reported in HPI. PHYSICAL EXAM: Body mass index is 45.7 kg/m². Vitals:    01/24/23 1307   BP: (!) 140/80   Pulse: (!) 101   Resp: 20   Temp: 98 °F (36.7 °C)   TempSrc: Temporal   SpO2: 94%   Weight: (!) 314 lb (142.4 kg)   Height: 5' 9.5\" (1.765 m)         General:   Alert, cooperative, no distress, appears stated age. Eyes:   Conjunctivae/corneas clear. PERRL        Mouth/Throat:  Lips, mucosa, and tongue normal. Teeth and gums normal.        Lungs:     minimal rale in right base, otherwise clear     Heart:   Regular rate and rhythm, S1, S2 normal, no murmur, click, rub or gallop. Abdomen:    Soft, non-tender. Extremities:  Extremities normal, atraumatic, no cyanosis or edema. Skin:  Skin color normal. No rashes or lesions     Neurologic:  A&Ox3     DIAGNOSTIC TESTS:                                                                                    LABS:   Lab Results   Component Value Date/Time    WBC 7.6 10/27/2022 02:45 AM    HGB 12.0 10/27/2022 02:45 AM    HCT 37.5 10/27/2022 02:45 AM     10/27/2022 02:45 AM    TSH 1.060 03/12/2020 12:22 PM    NTPROBNP 414 10/24/2022 12:54 PM    ESR 4 06/18/2019 03:30 PM     Imaging: I performed an independent interpretation of the patient's images. CXR:     XR CHEST PORTABLE 10/25/2022    Narrative  CHEST X-RAY, one view. INDICATION:  Respiratory failure, decreased oxygen saturation. TECHNIQUE:  AP upright portable view. COMPARISON: Yesterday's exam    FINDINGS:  Lungs: Mild interstitial prominence. No lobar consolidation. .  Costophrenic angles: are sharp. Heart size: is normal.  Pulmonary vasculature: is unremarkable. Aorta: Unremarkable. Included portion of the upper abdomen: is unremarkable. Bones: No gross bony lesions. Other: None. Impression  Nodular interstitial pattern. This could representfailure or  atypical pneumonia. CT Chest:   CT CHEST W CONTRAST 03/03/2021    Narrative  CT chest with contrast (pulmonary embolism protocol)    History: Shortness of breath, hypoxia    Technique: Helically acquired images were obtained from the lung apices to the  domes of the diaphragms reconstructed at 2.5mm intervals after the uneventful  administration of 100 cc's intravenous Isovue-370 in order to evaluate the  pulmonary arteries. Coronal reformatted images were submitted. Radiation dose reduction techniques were used for this study:  Our CT scanners  use one or all of the following: Automated exposure control, adjustment of the  mA and/or kVp according to patient's size, iterative reconstruction. Comparison: 04/20/2018    Findings:  There is no pleural or pericardial effusion. The heart and great  vessels are unremarkable. There are no filling defects within the pulmonary  arteries to suggest pulmonary emboli. There are no airspace opacities. There are  no pulmonary nodules. No adenopathy is present within the thorax. Moderate  hypertrophic changes are present involving the thoracic spine. Impression  No evidence of pulmonary embolism. Nuclear Medicine: No results found for this or any previous visit from the past 3650 days. PFTs:   No flowsheet data found. No results found for this or any previous visit. No results found for this or any previous visit. FeNO: No results found for this or any previous visit. FeNO and Likelihood of Eosinophilic Asthma   Unlikely Intermediate Likely   <25 ppb 25-50 ppb >50ppb   Exercise Oximetry:  Echo: No results found for this or any previous visit from the past 3650 days. [facility] Reference Info:                                                                                                                Past Medical History:   Diagnosis Date    Acquired cyst of kidney     Arthritis     generalized     Balanoposthitis     BPH (benign prostatic hypertrophy)     Chronic pain     back     Claustrophobia     Depression     Dysuria     GERD (gastroesophageal reflux disease)     managed with medication     High cholesterol     Hypertension     managed with medication     Impotence of organic origin     Obesity (BMI 30-39. 9)     Obesity (BMI 30-39. 9)     BMI 40.1    Other testicular hypofunction     Tinnitus of both ears     Type 2 diabetes mellitus (HCC)     type 2, adverage glucose 150-200, symptomatic is unknown     Unspecified adverse effect of anesthesia     woke up in middle of procedure x 2    Unspecified sleep apnea     CPAP compliant    Wears dentures     uppers        Tobacco Use      Smoking status: Former        Packs/day: 3.00        Years: 20.00        Pack years: 60        Types: Cigarettes Quit date: 10/5/1988        Years since quittin.3      Smokeless tobacco: Never      Tobacco comments: Quit smoking: quit     Allergies   Allergen Reactions    Adhesive Tape Hives     bandaids       Current Outpatient Medications   Medication Instructions    albuterol (PROVENTIL) 2.5 mg, Inhalation, EVERY 6 HOURS PRN    amLODIPine (NORVASC) 5 mg, Oral    atorvastatin (LIPITOR) 20 MG tablet No dose, route, or frequency recorded. azelastine (ASTELIN) 0.1 % nasal spray 1 spray, Nasal, 2 TIMES DAILY, Use in each nostril as directed    Cholecalciferol (VITAMIN D3) 50 MCG (2000) CAPS 1 capsule, Oral, DAILY    Cinnamon 500 MG CAPS 1 capsule, Oral, DAILY    famotidine (PEPCID) 40 mg, Oral, DAILY    gabapentin (NEURONTIN) 400 mg, Oral, 2 times daily, Morning and evening    gabapentin (NEURONTIN) 800 mg, Oral, EVERY EVENING    Garlic 10 MG CAPS 1 capsule, Oral, DAILY    glipiZIDE (GLUCOTROL) 10 mg, Oral, DAILY    hydroCHLOROthiazide (HYDRODIURIL) 25 mg, Oral, EVERY MORNING    insulin lispro (HUMALOG KWIKPEN) 200 UNIT/ML SOPN pen SubCUTAneous, SEE ADMIN INSTRUCTIONS, 20 units breakfast 20 units lunch 40 units supper    lisinopril (PRINIVIL;ZESTRIL) 20 mg, Oral, DAILY    meloxicam (MOBIC) 15 MG tablet     metFORMIN (GLUCOPHAGE-XR) 500 mg, Oral, 2 at lunch and 2 at dinner    naloxegol (MOVANTIK) 25 MG TABS tablet Oral, DAILY BEFORE BREAKFAST    naloxone 4 MG/0.1ML LIQD nasal spray No dose, route, or frequency recorded.     oxyCODONE (OXY-IR) 30 mg, Oral, EVERY 6 HOURS PRN    OXYGEN 4.5L oxygen into trilogy    pantoprazole (PROTONIX) 40 mg, Oral, 2 TIMES DAILY    Sennosides 8.6 MG CAPS 1 capsule, Oral, DAILY    tamsulosin (FLOMAX) 0.4 MG capsule TAKE 1 CAPSULE BY MOUTH DAILY    tiZANidine (ZANAFLEX) 4 MG tablet TAKE 2 TABLETS BY MOUTH AT BEDTIME AS NEEDED    Tresiba FlexTouch 160 Units, SubCUTAneous, EVERY MORNING    VITAMIN B COMPLEX-C PO 1 capsule, Oral, DAILY    Vitamin E 400 units TABS 1 tablet, Oral    zolpidem (AMBIEN) 10 mg, Oral, NIGHTLY PRN

## 2023-02-01 DIAGNOSIS — R60.0 BILATERAL LEG EDEMA: ICD-10-CM

## 2023-02-01 LAB
ANION GAP SERPL CALC-SCNC: 6 MMOL/L (ref 2–11)
BUN SERPL-MCNC: 12 MG/DL (ref 8–23)
CALCIUM SERPL-MCNC: 8.9 MG/DL (ref 8.3–10.4)
CHLORIDE SERPL-SCNC: 101 MMOL/L (ref 101–110)
CO2 SERPL-SCNC: 30 MMOL/L (ref 21–32)
CREAT SERPL-MCNC: 0.9 MG/DL (ref 0.8–1.5)
GLUCOSE SERPL-MCNC: 168 MG/DL (ref 65–100)
POTASSIUM SERPL-SCNC: 4.2 MMOL/L (ref 3.5–5.1)
SODIUM SERPL-SCNC: 137 MMOL/L (ref 133–143)

## 2023-02-02 ENCOUNTER — HOSPITAL ENCOUNTER (OUTPATIENT)
Dept: MRI IMAGING | Age: 68
Discharge: HOME | End: 2023-02-02
Payer: MEDICARE

## 2023-02-02 DIAGNOSIS — M54.16 LUMBAR BACK PAIN WITH RADICULOPATHY AFFECTING LOWER EXTREMITY: ICD-10-CM

## 2023-02-02 PROCEDURE — 72148 MRI LUMBAR SPINE W/O DYE: CPT

## 2023-02-06 DIAGNOSIS — M48.062 SPINAL STENOSIS OF LUMBAR REGION WITH NEUROGENIC CLAUDICATION: Primary | ICD-10-CM

## 2023-02-09 ENCOUNTER — TELEPHONE (OUTPATIENT)
Dept: PULMONOLOGY | Age: 68
End: 2023-02-09

## 2023-02-09 NOTE — TELEPHONE ENCOUNTER
Left patient message via voicemail to please give me a call as soon as they are available. // Jaquelin GILES

## 2023-02-13 ENCOUNTER — TELEPHONE (OUTPATIENT)
Dept: PULMONOLOGY | Age: 68
End: 2023-02-13

## 2023-02-13 NOTE — TELEPHONE ENCOUNTER
Patient has been taking Stiolto Respimat. Has noticed some side effects.   High blood sugar/eye pain/legs hurting/hurts when he urinate

## 2023-02-13 NOTE — TELEPHONE ENCOUNTER
Last seen: 1/24/23  Hx; COPD, MISTI w/ BiPap, LE edema    Was referred to cardiology has appt 3/6/23. Trial of Stiolto. Patient call to report since starting high blood sugar, eye pain, LE pain & pain w/ urination. Please advise if alternative inhaler recommended.

## 2023-02-13 NOTE — TELEPHONE ENCOUNTER
All of those are potential side effects of Stiolto with the exception of elevated blood sugar (not sure why this is occurring). Would stop the medication and continue to use albuterol q 4 hours prn for rescue.

## 2023-02-14 NOTE — TELEPHONE ENCOUNTER
Advised of NP recommendations, has albuterol nebulizer and has been using regularly but not feeling any effect. He will continue to use and try to be more consistent. Reinforced need to keep appt w/ cardiology. Confirms he wears CPAP, O2 levels stay good.

## 2023-02-20 RX ORDER — TAMSULOSIN HYDROCHLORIDE 0.4 MG/1
CAPSULE ORAL
Qty: 90 CAPSULE | Refills: 3 | Status: SHIPPED | OUTPATIENT
Start: 2023-02-20

## 2023-02-23 ENCOUNTER — OFFICE VISIT (OUTPATIENT)
Dept: ORTHOPEDIC SURGERY | Age: 68
End: 2023-02-23
Payer: MEDICARE

## 2023-02-23 VITALS — HEIGHT: 68 IN | WEIGHT: 311 LBS | BODY MASS INDEX: 47.13 KG/M2

## 2023-02-23 DIAGNOSIS — M48.062 SPINAL STENOSIS OF LUMBAR REGION WITH NEUROGENIC CLAUDICATION: Primary | ICD-10-CM

## 2023-02-23 PROCEDURE — 1123F ACP DISCUSS/DSCN MKR DOCD: CPT | Performed by: ORTHOPAEDIC SURGERY

## 2023-02-23 PROCEDURE — 99213 OFFICE O/P EST LOW 20 MIN: CPT | Performed by: ORTHOPAEDIC SURGERY

## 2023-02-23 NOTE — PROGRESS NOTES
Name: Myrtis Severance  YOB: 1955  Gender: male  MRN: [id number]  Age: 74 y.o. Chief complaint: Back and buttock pain with activities. History of present illness: This is a very pleasant 74 y.o. old male who presents with a longstanding history of low back pain with episodic radiation to the buttocks and lower extremities,  on the bilateral   side. The onset of the symptoms was rather insidious. The patient describes the quality of the pain as a deep ache. The patient has noticed a progressive decrease in his  ability to walk or stand for any extended period of time. His  walking and standing pain is usually relieved with sitting. He  has noted that pushing a cart in the store seems to help. He denies any change in bowel or bladder function since the onset of the symptoms. This patient has had lumbar surgery in the past including a fusion by Dr. Nasir Burton. Thus far, the patient has had care in an interventional pain management setting. Medications:       Current Outpatient Medications:     tamsulosin (FLOMAX) 0.4 MG capsule, TAKE 1 CAPSULE BY MOUTH  DAILY, Disp: 90 capsule, Rfl: 3    hydroCHLOROthiazide (HYDRODIURIL) 25 MG tablet, Take 1 tablet by mouth every morning, Disp: 30 tablet, Rfl: 5    tiZANidine (ZANAFLEX) 4 MG tablet, TAKE 2 TABLETS BY MOUTH AT BEDTIME AS NEEDED, Disp: , Rfl:     zolpidem (AMBIEN) 10 MG tablet, Take 1 tablet by mouth nightly as needed for Sleep for up to 181 days. , Disp: 30 tablet, Rfl: 5    TRESIBA FLEXTOUCH 200 UNIT/ML SOPN, Inject 160 Units into the skin every morning, Disp: , Rfl:     VITAMIN B COMPLEX-C PO, Take 1 capsule by mouth daily, Disp: , Rfl:     Cinnamon 500 MG CAPS, Take 1 capsule by mouth daily, Disp: , Rfl:     Garlic 10 MG CAPS, Take 1 capsule by mouth daily, Disp: , Rfl:     gabapentin (NEURONTIN) 400 MG capsule, Take 800 mg by mouth every evening., Disp: , Rfl:     naloxone 4 MG/0.1ML LIQD nasal spray, , Disp: , Rfl:     meloxicam (MOBIC) 15 MG tablet, , Disp: , Rfl:     gabapentin (NEURONTIN) 400 MG capsule, Take 400 mg by mouth in the morning and at bedtime. Morning and evening, Disp: , Rfl:     atorvastatin (LIPITOR) 20 MG tablet, , Disp: , Rfl:     azelastine (ASTELIN) 0.1 % nasal spray, 1 spray by Nasal route 2 times daily Use in each nostril as directed (Patient taking differently: 1 spray by Nasal route nightly as needed for Rhinitis Use in each nostril as directed), Disp: 60 mL, Rfl: 1    OXYGEN, 4.5L oxygen into trilogy, Disp: , Rfl:     amLODIPine (NORVASC) 5 MG tablet, Take 5 mg by mouth, Disp: , Rfl:     famotidine (PEPCID) 40 MG tablet, Take 40 mg by mouth daily, Disp: , Rfl:     glipiZIDE (GLUCOTROL) 10 MG tablet, Take 10 mg by mouth daily, Disp: , Rfl:     insulin lispro (HUMALOG KWIKPEN) 200 UNIT/ML SOPN pen, Inject into the skin See Admin Instructions 20 units breakfast 20 units lunch 40 units supper, Disp: , Rfl:     lisinopril (PRINIVIL;ZESTRIL) 20 MG tablet, Take 20 mg by mouth daily, Disp: , Rfl:     metFORMIN (GLUCOPHAGE-XR) 500 MG extended release tablet, Take 500 mg by mouth 2 at lunch and 2 at dinner, Disp: , Rfl:     naloxegol (MOVANTIK) 25 MG TABS tablet, Take by mouth every morning (before breakfast), Disp: , Rfl:     oxyCODONE (OXY-IR) 30 MG immediate release tablet, Take 30 mg by mouth every 6 hours as needed. , Disp: , Rfl:     pantoprazole (PROTONIX) 40 MG tablet, Take 40 mg by mouth 2 times daily, Disp: , Rfl:     Sennosides 8.6 MG CAPS, Take 1 capsule by mouth daily, Disp: , Rfl:     Vitamin E 400 units TABS, Take 1 tablet by mouth, Disp: , Rfl:     albuterol (PROVENTIL) (2.5 MG/3ML) 0.083% nebulizer solution, Inhale 2.5 mg into the lungs every 6 hours as needed, Disp: , Rfl:     Allergies: Allergies   Allergen Reactions    Adhesive Tape Hives     bandaids           Physical Exam:     This is a well developed well nourished male adult in no acute distress. Mood and affect are appropriate.     Oriented to person, place, and time. Respirations are unlabored and there is no evidence of cyanosis    The patient ambulates with a mildly antalgic gait and crouched posture. No focal motor deficit. He has subjective paresthesias in both hands and both feet. There is no spasticity to suggest myelopathy. Radiographic Studies:     MRI of the lumbar spine images were reviewed and interpreted: He has significant L2-L3 stenosis above a prior L3-L5 fusion. Diagnosis:      ICD-10-CM    1. Spinal stenosis of lumbar region with neurogenic claudication  M48.062           Assessment/Plan: This patient's clinical history and physical exam is consistent with neurogenic claudication which is likely due to lumbar stenosis cephalad to a previous fusion surgery. From a surgical perspective, he would need an extension of his old fusion cephalad. But in reality, his BMI is quite high and it would be technically difficult to have success with such a surgery. The patient needs to lose a significant amount of weight before he would be a reasonable candidate.          Electronically Signed By Patrick Melendez MD     2:54 PM

## 2023-02-27 NOTE — PROGRESS NOTES
[address] [place] [address] Court, [address] [facility]  [phone]    Patient Name:  Chad Bruner  YOB: 1955      Office Visit 3/1/2023    Chief Complaint   Patient presents with    CPAP/BiPAP     F/U ON PRESSURE CHANGE     Sleep Apnea       HISTORY OF PRESENT ILLNESS:    Patient in for a follow-up visit. Please note the patient has obesity hypoventilation syndrome, insomnia, volume overload and came in for a follow-up visit. Looking over his records, in the past patient was changed to a noninvasive positive pressure machine with oxygen. He is currently  on iVapps with target alveolar ventilation of 6.4 L. EPAP of 8. Minimum pressure support of 5, maximum pressure support 20 and target patient rate of 12. He is using oxygen at 4 LPM with it. He has very good compliance and has use the machine 43 out of 43 days 40 days more than 4 hours average use 6 hours and 26 minutes AHI is down to 0.2. Mean airleak is 0 L/min 95% air leak is 5.2 L/min. He currently reports that he does have some issues with his mask he has not Mark fullface mask and indicates sometimes the part of the tubing does get wrapped around. His neck and he finds it uncomfortable. Overall he has been able to tolerate and feels better. Patient reports that he is still using Ambien 10 mg at nighttime. He indicates that in the past they have tried other medication on him and it did not work. He has been using Ambien for over 20 years and it seems to be very effective for him and maintaining sleep. Looking over the records recently, [facility] as well as Dr. Nurys Flannery have been adjusting the patient's diuretics, since has been having lower extremity edema. Currently on hydrochlorothiazide 25 mg but still having a fair amount of leg edema and indicates his legs do weep sometimes. Noted he is on Norvasc. He also reports he has not taken any diuretics in the past such as Lasix.     Currently his [place] score is 9/24 which is stable      Sleep Medicine 3/1/2023 11/15/2022 5/13/2022 10/18/2021   Sitting and reading 2 3 3 2   Watching TV 2 3 2 1   Sitting, inactive in a public place (e.g. a theatre or a meeting) 0 0 1 2   As a passenger in a car for an hour without a break 0 0 0 0   Lying down to rest in the afternoon when circumstances permit 3 0 0 3   Sitting and talking to someone 0 0 0 1   Sitting quietly after a lunch without alcohol 2 3 3 3   In a car, while stopped for a few minutes in traffic 0 0 0 1   [place] Sleepiness Score 9 9 9 13             DIAGNOSTIC TESTS:  SLEEP STUDY-10/29/20  AHI-21.9  LOWEST DESAT-77  PRINCESS-7 SCREENING 6/27/2022   Feeling nervous, anxious, or on edge Not at all   Not being able to stop or control worrying Not at all   Worrying too much about different things Not at all   Trouble relaxing Not at all   Being so restless that it is hard to sit still Not at all   Becoming easily annoyed or irritable Not at all   Feeling afraid as if something awful might happen Not at all   PRINCESS-7 Total Score 0   How difficult have these problems made it for you to do your work, take care of things at home, or get along with other people? Not difficult at all            Past Medical History:   Diagnosis Date    Acquired cyst of kidney     Arthritis     generalized     Balanoposthitis     BPH (benign prostatic hypertrophy)     Chronic pain     back     Claustrophobia     Depression     Dysuria     GERD (gastroesophageal reflux disease)     managed with medication     High cholesterol     Hypertension     managed with medication     Impotence of organic origin     Obesity (BMI 30-39. 9)     Obesity (BMI 30-39. 9)     BMI 40.1    Other testicular hypofunction     Tinnitus of both ears     Type 2 diabetes mellitus (HCC)     type 2, adverage glucose 150-200, symptomatic is unknown     Unspecified adverse effect of anesthesia     woke up in middle of procedure x 2    Unspecified sleep apnea     CPAP compliant    Wears dentures     uppers         Patient Active Problem List   Diagnosis    Spinal stenosis of lumbar region with neurogenic claudication    Hypoxemia    Essential hypertension, benign    Spinal stenosis, lumbar region, with neurogenic claudication    Chest pain    Obesity (BMI 30-39. 9)    High cholesterol    COPD (chronic obstructive pulmonary disease) ([facility])    Hypocalcemia    Type 2 diabetes mellitus without complication, with long-term current use of insulin ([facility])    LOPEZ (dyspnea on exertion)    Obesity, Class III, BMI 40-49.9 (morbid obesity) ([facility])    Chronic low back pain    GERD (gastroesophageal reflux disease)    MISTI (obstructive sleep apnea)    Flu    Insomnia    Morbid obesity with BMI of 45.0-49.9, adult ([facility])    Obesity hypoventilation syndrome ([address]    Hypervolemia           Past Surgical History:   Procedure Laterality Date    ANKLE FRACTURE SURGERY  5 yrs ago    right with pinning    CARPAL TUNNEL RELEASE  over 10 yrs ago    bilateral    CIRCUMCISION      ORTHOPEDIC SURGERY      L4 and 5 compression with dustin in place         Social History     Socioeconomic History    Marital status: Single     Spouse name: Not on file    Number of children: Not on file    Years of education: Not on file    Highest education level: Not on file   Occupational History    Not on file   Tobacco Use    Smoking status: Former     Packs/day: 3.00     Years: 20.00     Pack years: 60.00     Types: Cigarettes     Quit date: 10/5/1988     Years since quittin.4    Smokeless tobacco: Never    Tobacco comments:     Quit smoking: quit    Substance and Sexual Activity    Alcohol use: Yes    Drug use: No    Sexual activity: Not on file   Other Topics Concern    Not on file   Social History Narrative    Not on file     Social Determinants of Health     Financial Resource Strain: Not on file   Food Insecurity: Not on file   Transportation Needs: Not on file   Physical Activity: Inactive    Days of Exercise per Week: 0 days    Minutes of Exercise per Session: 0 min   Stress: Not on file   Social Connections: Not on file   Intimate Partner Violence: Not on file   Housing Stability: Not on file         Family History   Problem Relation Age of Onset    Diabetes Sister     Diabetes Brother     Diabetes Sister     Diabetes Mother     Stroke Father     Hypertension Mother     Diabetes Father     Hypertension Father     Stroke Mother          Allergies   Allergen Reactions    Adhesive Tape Hives     bandaids           Current Outpatient Medications   Medication Sig    furosemide (LASIX) 20 MG tablet Take 1 tablet by mouth daily    [START ON 5/10/2023] zolpidem (AMBIEN) 10 MG tablet Take 1 tablet by mouth nightly as needed for Sleep for up to 181 days. tamsulosin (FLOMAX) 0.4 MG capsule TAKE 1 CAPSULE BY MOUTH  DAILY    hydroCHLOROthiazide (HYDRODIURIL) 25 MG tablet Take 1 tablet by mouth every morning    tiZANidine (ZANAFLEX) 4 MG tablet TAKE 2 TABLETS BY MOUTH AT BEDTIME AS NEEDED    TRESIBA FLEXTOUCH 200 UNIT/ML SOPN Inject 160 Units into the skin every morning    VITAMIN B COMPLEX-C PO Take 1 capsule by mouth daily    Garlic 10 MG CAPS Take 1 capsule by mouth daily    gabapentin (NEURONTIN) 400 MG capsule Take 800 mg by mouth every evening. meloxicam (MOBIC) 15 MG tablet     gabapentin (NEURONTIN) 400 MG capsule Take 400 mg by mouth in the morning and at bedtime.  Morning and evening    atorvastatin (LIPITOR) 20 MG tablet     azelastine (ASTELIN) 0.1 % nasal spray 1 spray by Nasal route 2 times daily Use in each nostril as directed (Patient taking differently: 1 spray by Nasal route nightly as needed for Rhinitis Use in each nostril as directed)    OXYGEN 4.5L oxygen into trilogy    amLODIPine (NORVASC) 5 MG tablet Take 5 mg by mouth    famotidine (PEPCID) 40 MG tablet Take 40 mg by mouth daily    glipiZIDE (GLUCOTROL) 10 MG tablet Take 10 mg by mouth daily    insulin lispro (HUMALOG KWIKPEN) 200 UNIT/ML SOPN pen Inject into the skin See Admin Instructions 20 units breakfast 20 units lunch 40 units supper    lisinopril (PRINIVIL;ZESTRIL) 20 MG tablet Take 20 mg by mouth daily    metFORMIN (GLUCOPHAGE-XR) 500 MG extended release tablet Take 500 mg by mouth 2 at lunch and 2 at dinner    naloxegol (MOVANTIK) 25 MG TABS tablet Take by mouth every morning (before breakfast)    oxyCODONE (OXY-IR) 30 MG immediate release tablet Take 30 mg by mouth every 6 hours as needed. pantoprazole (PROTONIX) 40 MG tablet Take 40 mg by mouth 2 times daily    Sennosides 8.6 MG CAPS Take 1 capsule by mouth daily    Vitamin E 400 units TABS Take 1 tablet by mouth    Cinnamon 500 MG CAPS Take 1 capsule by mouth daily (Patient not taking: Reported on 3/1/2023)    naloxone 4 MG/0.1ML LIQD nasal spray  (Patient not taking: Reported on 3/1/2023)    albuterol (PROVENTIL) (2.5 MG/3ML) 0.083% nebulizer solution Inhale 2.5 mg into the lungs every 6 hours as needed     No current facility-administered medications for this visit. REVIEW OF SYSTEMS:     CONSTITUTIONAL:   There is Negative history of fever, chills, night sweats. Patient is  Negativefor weight loss, they are  Positive for  weight gain. Patient is  Negative  for fatigue and hypersomnia and is  on their CPAP. Insomnia is   under control with medications. CARDIAC:   No chest pain, pressure, discomfort, palpitations, orthopnea, murmurs. Positive for lower extremity edema . GI:   No dysphagia, heartburn reflux, nausea/vomiting, diarrhea, abdominal pain, or bleeding. NEURO:    There is no history of AMS, persistent headache, decreased level of consciousness, seizures, or motor or sensory deficits. PHYSICAL EXAM:    BP (!) 140/64   Pulse 63   Temp 97.3 °F (36.3 °C)   Resp 18   Ht 5' 9\" (1.753 m)   Wt (!) 311 lb 8 oz (141.3 kg)   SpO2 96%   BMI 46.00 kg/m²     Body mass index is 46 kg/m².     Constitutional:  the patient is well developed and in no acute distress  EENMT:  Sclera clear, pupils equal, oral mucosa moist, narrow oral airway Modified Higgins Stage 3, Nares are patent bilateral  Respiratory: CTA b/l. No Wheezing or Rhonchi.  With distant breath sounds  Cardiovascular:  RRR without M,G,R  Gastrointestinal: soft and non-tender; with positive bowel sounds.  Musculoskeletal: warm without cyanosis. There is 2+ lower leg edema.  Skin:  no jaundice or rashes, no visible wounds   Neurologic: no gross neuro deficits     Psychiatric:  alert and oriented x 3        ASSESSMENT/PLAN:  (Medical Decision Making)       ICD-10-CM    1. Obesity hypoventilation syndrome (HCC)  E66.2 -Excellent compliance with noninvasive positive pressure machine - iVaps pulm compliance data.  He is benefiting.  Renew supplies    --He is using oxygen at 4.5 L/min with device      2. Hypoxemia  R09.02 -Compliant with oxygen with his noninvasive positive pressure ventilator and last overnight oximetry shows that he does not have any significant hypoxemia using both items      3. Morbid obesity with BMI of 45.0-49.9, adult ([facility])  E66.01 -Needs to work on weight loss by diet and exercise.  He is aware    Z68.42       4. Hypervolemia, unspecified hypervolemia type  E87.70 -Patient does have marked edema of the lower extremities and they are quite tight today.  We will order Lasix 20 mg daily for 5 days and patient can take some potassium containing fruit for the next few days either a banana or an orange and see if that helps.  If like improvement is markedly better may need to use Lasix and not hydrochlorothiazide also may need to change from amlodipine to an alternative agent given lower extremity edema         5. Insomnia G47.00  -- Has used Ambien 10 mg for years and effective.  Will renew accordingly.  Can then get item from the pulmonary office in the future if has follow-up visits.  Last refill was 2/13/2022 and still has 2 more refills    SUMMARY:    Continue iVAPS and oxygen  Patient  is compliant and benefiting  Renew supplies    Reorder Ambien for May for 6 months. Did go to the [facility]. Patient is using schedule II medications appropriately with no evidence of abuse/misuse. New medications ordered appropriately      Weight loss by diet and exercise  Continue proper sleep hygiene    Continue hypertensive medications  Add Lasix 20 mg daily for 5 days. Consume potassium containing fruits next few days with diuretics for example orange or banana  Follow-up in pulmonary      Did review Varina score, Compliance data for iVaps, Scripts website, etc.    Time spent was 44 minutes. All questions are answered to the patient's satisfaction. The patient will call for further questions and concerns. Follow up at the [address] [place] [address] will be in 1 year. Follow up will be with the patient's referring physician as had been previously scheduled. Kandace Lee MD    Over 50% of today's office visit was spent in face to face time reviewing test results, prognosis, importance of compliance, education about disease process, benefits of medications, instructions for management of acute flare-ups, and follow up plans. Electronically signed and dictated. Please note if there are errors this is  likely a result of the dictation software.     Orders Placed This Encounter   Procedures    DME [facility] [address] [facility]  Phone: [unfilled]    Patient Name: Malathi Wilcox  : 1955  Gender: male  Address: [place] [address] [address]   Last 4 digits of SSN: [unfilled]    [facility] Insurance: Payor: Trinh Nobles / Plan: [employer] / Product Type: *No Product type* /   Subscriber ID: [id number] - (Medicare Managed)      AMB Supply Order  Order Details     DME Location:[facility]   Order Date: 3/1/2023   The primary encounter diagnosis was Obesity hypoventilation syndrome ([facility] Diagnoses of Hypoxemia, Morbid obesity with BMI of 45.0-49.9, adult ([facility] and Hypervolemia, unspecified hypervolemia type were also pertinent to this visit. (  X   )Supplies Needed        Machine   (     ) CPAP Unit  (     ) Auto CPAP Unit  (     ) BiLevel Unit  (     ) Auto BiLevel Unit  (     ) ASV   (     ) Bilevel ST      Length of need: 12 months    Pressure:EPAP 8 cmH2O  Min PS 5 cmH2O  Max PS 20 cmH2O   cmH20  TARGET RATE 12  EPR:      Starting Ramp Pressure:   cm H20  Ramp Time: min      Patient had a diagnostic Apnea Hypopnea Index (AHI) of :  21.9  *SUPPLIES* Replace all as needed, or per coverage guidelines     Masks Type:  ( x   ) -Full Face Mask (1 per 3 mon)  (  x  ) -Full Mask (1 per month) Interface/Cushion      (  ) -Nasal Mask (1 per 3 mon)  (  ) - Nasal Mask (1 per month) Interface/Cushion  (     ) -Pillow (2 per mon)  (     ) -Ljabuxpvn (1 per 6 mon)            Other Supplies:    (   X  )-Zsjgwmev (1 per 6 mon)  (   X  )-Zqhzyf Tubing (1 per 3 mon)  (   X  )- Disposable Filter (2 per mon)  (   x  )-Enswhb Humidifier (1 per year)     ( x    )-Yltrpcgmm (sometimes used with Full Face Mask) (1 per 6 mos)  (    )-Tubing-without heat (1 per 3 mos)  (     )-Non-Disposable Filter (1 per 6 mos)  (  x   )-Water Chamber (1 per 6 mos)  (     )-Humidifier non-heated (1 per 5 yrs)      Signed Date: 3/1/2023  Electronically Signed By: Kimberley Samaniego MA  Electronically Dated:  3/1/2023        Orders Placed This Encounter   Medications    furosemide (LASIX) 20 MG tablet     Sig: Take 1 tablet by mouth daily     Dispense:  5 tablet     Refill:  0    zolpidem (AMBIEN) 10 MG tablet     Sig: Take 1 tablet by mouth nightly as needed for Sleep for up to 181 days.      Dispense:  30 tablet     Refill:  5

## 2023-03-01 ENCOUNTER — OFFICE VISIT (OUTPATIENT)
Dept: SLEEP MEDICINE | Age: 68
End: 2023-03-01
Payer: MEDICARE

## 2023-03-01 VITALS
DIASTOLIC BLOOD PRESSURE: 64 MMHG | HEIGHT: 69 IN | TEMPERATURE: 97.3 F | HEART RATE: 63 BPM | BODY MASS INDEX: 46.14 KG/M2 | WEIGHT: 311.5 LBS | RESPIRATION RATE: 18 BRPM | SYSTOLIC BLOOD PRESSURE: 140 MMHG | OXYGEN SATURATION: 96 %

## 2023-03-01 DIAGNOSIS — E87.70 HYPERVOLEMIA, UNSPECIFIED HYPERVOLEMIA TYPE: ICD-10-CM

## 2023-03-01 DIAGNOSIS — G47.33 OSA (OBSTRUCTIVE SLEEP APNEA): ICD-10-CM

## 2023-03-01 DIAGNOSIS — R09.02 HYPOXEMIA: ICD-10-CM

## 2023-03-01 DIAGNOSIS — E66.2 OBESITY HYPOVENTILATION SYNDROME (HCC): Primary | ICD-10-CM

## 2023-03-01 DIAGNOSIS — E66.01 MORBID OBESITY WITH BMI OF 45.0-49.9, ADULT (HCC): ICD-10-CM

## 2023-03-01 DIAGNOSIS — G47.00 INSOMNIA, UNSPECIFIED TYPE: ICD-10-CM

## 2023-03-01 PROCEDURE — 99215 OFFICE O/P EST HI 40 MIN: CPT | Performed by: INTERNAL MEDICINE

## 2023-03-01 PROCEDURE — 3077F SYST BP >= 140 MM HG: CPT | Performed by: INTERNAL MEDICINE

## 2023-03-01 PROCEDURE — 3078F DIAST BP <80 MM HG: CPT | Performed by: INTERNAL MEDICINE

## 2023-03-01 PROCEDURE — 1123F ACP DISCUSS/DSCN MKR DOCD: CPT | Performed by: INTERNAL MEDICINE

## 2023-03-01 RX ORDER — FUROSEMIDE 20 MG/1
20 TABLET ORAL DAILY
Qty: 5 TABLET | Refills: 0 | Status: SHIPPED | OUTPATIENT
Start: 2023-03-01

## 2023-03-01 RX ORDER — ZOLPIDEM TARTRATE 10 MG/1
10 TABLET ORAL NIGHTLY PRN
Qty: 30 TABLET | Refills: 5 | Status: SHIPPED | OUTPATIENT
Start: 2023-05-10 | End: 2023-11-07

## 2023-03-01 ASSESSMENT — SLEEP AND FATIGUE QUESTIONNAIRES
HOW LIKELY ARE YOU TO NOD OFF OR FALL ASLEEP WHEN YOU ARE A PASSENGER IN A CAR FOR AN HOUR WITHOUT A BREAK: 0
HOW LIKELY ARE YOU TO NOD OFF OR FALL ASLEEP WHILE SITTING AND TALKING TO SOMEONE: 0
HOW LIKELY ARE YOU TO NOD OFF OR FALL ASLEEP WHILE WATCHING TV: 2
HOW LIKELY ARE YOU TO NOD OFF OR FALL ASLEEP WHILE SITTING QUIETLY AFTER LUNCH WITHOUT ALCOHOL: 2
HOW LIKELY ARE YOU TO NOD OFF OR FALL ASLEEP IN A CAR, WHILE STOPPED FOR A FEW MINUTES IN TRAFFIC: 0
HOW LIKELY ARE YOU TO NOD OFF OR FALL ASLEEP WHILE SITTING AND READING: 2
HOW LIKELY ARE YOU TO NOD OFF OR FALL ASLEEP WHILE SITTING INACTIVE IN A PUBLIC PLACE: 0
HOW LIKELY ARE YOU TO NOD OFF OR FALL ASLEEP WHILE LYING DOWN TO REST IN THE AFTERNOON WHEN CIRCUMSTANCES PERMIT: 3
ESS TOTAL SCORE: 9

## 2023-03-06 ENCOUNTER — INITIAL CONSULT (OUTPATIENT)
Dept: CARDIOLOGY CLINIC | Age: 68
End: 2023-03-06
Payer: MEDICARE

## 2023-03-06 VITALS
WEIGHT: 304.9 LBS | DIASTOLIC BLOOD PRESSURE: 60 MMHG | BODY MASS INDEX: 45.16 KG/M2 | HEIGHT: 69 IN | HEART RATE: 80 BPM | SYSTOLIC BLOOD PRESSURE: 120 MMHG

## 2023-03-06 DIAGNOSIS — R07.2 PRECORDIAL PAIN: ICD-10-CM

## 2023-03-06 DIAGNOSIS — I10 ESSENTIAL HYPERTENSION: Primary | ICD-10-CM

## 2023-03-06 DIAGNOSIS — R06.09 DYSPNEA ON EXERTION: ICD-10-CM

## 2023-03-06 PROCEDURE — 99204 OFFICE O/P NEW MOD 45 MIN: CPT | Performed by: INTERNAL MEDICINE

## 2023-03-06 PROCEDURE — 3078F DIAST BP <80 MM HG: CPT | Performed by: INTERNAL MEDICINE

## 2023-03-06 PROCEDURE — 3074F SYST BP LT 130 MM HG: CPT | Performed by: INTERNAL MEDICINE

## 2023-03-06 PROCEDURE — 1123F ACP DISCUSS/DSCN MKR DOCD: CPT | Performed by: INTERNAL MEDICINE

## 2023-03-06 RX ORDER — CHOLECALCIFEROL (VITAMIN D3) 125 MCG
5 CAPSULE ORAL DAILY
COMMUNITY

## 2023-03-06 RX ORDER — VALSARTAN AND HYDROCHLOROTHIAZIDE 320; 25 MG/1; MG/1
1 TABLET, FILM COATED ORAL DAILY
Qty: 90 TABLET | Refills: 3 | Status: SHIPPED | OUTPATIENT
Start: 2023-03-06

## 2023-03-06 ASSESSMENT — ENCOUNTER SYMPTOMS
ORTHOPNEA: 0
ABDOMINAL PAIN: 0
SHORTNESS OF BREATH: 0
BACK PAIN: 0
DOUBLE VISION: 0
COUGH: 0
VOMITING: 0
BLOATING: 0
HEMOPTYSIS: 0
BLURRED VISION: 0
NAUSEA: 0

## 2023-03-06 NOTE — PROGRESS NOTES
[facility] CARDIOLOGY  [address], [address] [facility], [address] [place] [address]  PHONE: [phone]    23    NAME:  Akua Douglass  :   MRN: [id number]         SUBJECTIVE:   Akua Douglass is a 74 y.o. male seen for a visit regarding the following:     Chief Complaint   Patient presents with    Consultation    Hyperlipidemia    Hypertension       HPI:      No prior history of coronary disease. History of COPD, MISTI on BiPAP and sees pulmonology, hypertension, diabetes mellitus (A1C 9.5; 2022-sees endocrine). Echo from 2018 with preserved EF and normal diastolic function. Intermittent chest tightness/pressure with radiation to left arm at times; . Can come on at anytime; lasts for mins. Also some increased LOPEZ; also states some dyspnea with getting excited. Last noted ~4-5 weeks ago. Difficult historian. Some increased lower extremity edema. Denies any PND/orthopnea. Well controlled home BPs    Past Medical History, Past Surgical History, Family history, Social History, and Medications were all reviewed with the patient today and updated as necessary. Allergies   Allergen Reactions    Adhesive Tape Hives     bandaids       Patient Active Problem List   Diagnosis    Spinal stenosis of lumbar region with neurogenic claudication    Hypoxemia    Essential hypertension, benign    Spinal stenosis, lumbar region, with neurogenic claudication    Chest pain    Obesity (BMI 30-39. 9)    High cholesterol    COPD (chronic obstructive pulmonary disease) ([facility])    Hypocalcemia    Type 2 diabetes mellitus without complication, with long-term current use of insulin ([facility])    LOPEZ (dyspnea on exertion)    Obesity, Class III, BMI 40-49.9 (morbid obesity) ([facility])    Chronic low back pain    GERD (gastroesophageal reflux disease)    MISTI (obstructive sleep apnea)    Flu    Insomnia    Morbid obesity with BMI of 45.0-49.9, adult ([facility])    Obesity hypoventilation syndrome ([facility])    Hypervolemia     Past Medical History:   Diagnosis Date    Acquired cyst of kidney     Arthritis     generalized     Balanoposthitis     BPH (benign prostatic hypertrophy)     Chronic pain     back     Claustrophobia     Depression     Dysuria     GERD (gastroesophageal reflux disease)     managed with medication     High cholesterol     Hypertension     managed with medication     Impotence of organic origin     Obesity (BMI 30-39.9)     Obesity (BMI 30-39.9)     BMI 40.1    Other testicular hypofunction     Tinnitus of both ears     Type 2 diabetes mellitus (HCC)     type 2, adverage glucose 150-200, symptomatic is unknown     Unspecified adverse effect of anesthesia     woke up in middle of procedure x 2    Unspecified sleep apnea     CPAP compliant    Wears dentures     uppers     Past Surgical History:   Procedure Laterality Date    ANKLE FRACTURE SURGERY  5 yrs ago    right with pinning    CARPAL TUNNEL RELEASE  over 10 yrs ago    bilateral    CIRCUMCISION      ORTHOPEDIC SURGERY      L4 and 5 compression with dustin in place     Family History   Problem Relation Age of Onset    Diabetes Mother     Hypertension Mother     Stroke Mother     Stroke Father     Diabetes Father     Hypertension Father     Diabetes Sister     Diabetes Sister     Diabetes Brother     Heart Disease Neg Hx     Heart Attack Neg Hx      Social History     Tobacco Use    Smoking status: Former     Packs/day: 1.50     Years: 20.00     Pack years: 30.00     Types: Cigarettes     Quit date: 10/5/1988     Years since quittin.4    Smokeless tobacco: Never    Tobacco comments:     Quit smoking: quit    Substance Use Topics    Alcohol use: Yes     Comment: occ     Current Outpatient Medications   Medication Sig Dispense Refill    Multiple Vitamin (MULTIVITAMIN ADULT PO) Take by mouth      FISH OIL-KRILL OIL PO Take by mouth      CINNAMON PO Take by mouth      melatonin 5 MG TABS tablet Take 5 mg by mouth daily      Probiotic Product (PROBIOTIC PO) Take  by mouth every other day      valsartan-hydroCHLOROthiazide (DIOVAN-HCT) 320-25 MG per tablet Take 1 tablet by mouth daily 90 tablet 3    furosemide (LASIX) 20 MG tablet Take 1 tablet by mouth daily 5 tablet 0    [START ON 5/10/2023] zolpidem (AMBIEN) 10 MG tablet Take 1 tablet by mouth nightly as needed for Sleep for up to 181 days. 30 tablet 5    tamsulosin (FLOMAX) 0.4 MG capsule TAKE 1 CAPSULE BY MOUTH  DAILY 90 capsule 3    tiZANidine (ZANAFLEX) 4 MG tablet TAKE 2 TABLETS BY MOUTH AT BEDTIME AS NEEDED      TRESIBA FLEXTOUCH 200 UNIT/ML SOPN Inject 160 Units into the skin every morning      VITAMIN B COMPLEX-C PO Take 1 capsule by mouth daily      Garlic 10 MG CAPS Take 1 capsule by mouth daily      meloxicam (MOBIC) 15 MG tablet       gabapentin (NEURONTIN) 600 MG tablet Take 600 mg by mouth in the morning and at bedtime. I in the Morning, 1 in the Afternoon and 2 at Bedtime      atorvastatin (LIPITOR) 20 MG tablet       OXYGEN 4.5L oxygen into trilogy      famotidine (PEPCID) 40 MG tablet Take 40 mg by mouth daily      glipiZIDE (GLUCOTROL) 10 MG tablet Take 10 mg by mouth daily      insulin lispro (HUMALOG KWIKPEN) 200 UNIT/ML SOPN pen Inject into the skin See Admin Instructions 20 units breakfast 20 units lunch 40 units supper      metFORMIN (GLUCOPHAGE-XR) 500 MG extended release tablet Take 500 mg by mouth 2 at lunch and 2 at dinner      naloxegol (MOVANTIK) 25 MG TABS tablet Take by mouth every morning (before breakfast)      oxyCODONE (OXY-IR) 30 MG immediate release tablet Take 30 mg by mouth every 6 hours as needed. pantoprazole (PROTONIX) 40 MG tablet Take 40 mg by mouth 2 times daily      Sennosides 8.6 MG CAPS Take 1 capsule by mouth daily      Vitamin E 400 units TABS Take 1 tablet by mouth       No current facility-administered medications for this visit. Review of Systems   Constitutional: Negative for chills, decreased appetite, fever, malaise/fatigue and weight gain.    HENT: Negative for nosebleeds. Eyes:  Negative for blurred vision and double vision. Cardiovascular:  Positive for chest pain, dyspnea on exertion and leg swelling. Negative for claudication, orthopnea, palpitations, paroxysmal nocturnal dyspnea and syncope. Respiratory:  Negative for cough, hemoptysis and shortness of breath. Endocrine: Negative for cold intolerance and heat intolerance. Hematologic/Lymphatic: Negative for bleeding problem. Skin:  Negative for rash. Musculoskeletal:  Negative for back pain, joint pain, muscle weakness and myalgias. Gastrointestinal:  Negative for bloating, abdominal pain, nausea and vomiting. Genitourinary:  Negative for dysuria. Neurological:  Negative for dizziness, light-headedness and weakness. Psychiatric/Behavioral:  Negative for altered mental status. PHYSICAL EXAM:    /60   Pulse 80   Ht 5' 9\" (1.753 m)   Wt (!) 304 lb 14.4 oz (138.3 kg)   BMI 45.03 kg/m²      Physical Exam  Constitutional:       Appearance: Normal appearance. HENT:      Head: Normocephalic and atraumatic. Mouth/Throat:      Mouth: Mucous membranes are moist.   Eyes:      Pupils: Pupils are equal, round, and reactive to light. Cardiovascular:      Rate and Rhythm: Normal rate and regular rhythm. Pulses: Normal pulses. Pulmonary:      Effort: Pulmonary effort is normal.      Breath sounds: Normal breath sounds. Abdominal:      General: Bowel sounds are normal. There is no distension. Palpations: Abdomen is soft. Tenderness: There is no abdominal tenderness. Musculoskeletal:         General: Swelling (1+; stasis changes) present. Cervical back: Normal range of motion. Skin:     General: Skin is warm and dry. Neurological:      General: No focal deficit present. Mental Status: He is alert and oriented to person, place, and time. Medical problems and test results were reviewed with the patient today.      No results found for this or any previous visit (from the past 672 hour(s)). Lab Results   Component Value Date/Time    CHOL 119 10/12/2022 03:17 PM    HDL 34 10/12/2022 03:17 PM    VLDL 33 02/17/2022 02:53 PM       No results found for any visits on 03/06/23. ASSESSMENT and PLAN    Megan Morris was seen today for consultation, hyperlipidemia and hypertension. Diagnoses and all orders for this visit:    Essential hypertension    Dyspnea on exertion  -     CLINIC PERFORMED - Transthoracic echocardiogram (TTE) complete with contrast, bubble, strain, and 3D PRN; Future  -     Nuclear stress test with myocardial perfusion; Future    Precordial pain  -     CLINIC PERFORMED - Transthoracic echocardiogram (TTE) complete with contrast, bubble, strain, and 3D PRN; Future  -     Nuclear stress test with myocardial perfusion; Future    Other orders  -     valsartan-hydroCHLOROthiazide (DIOVAN-HCT) 320-25 MG per tablet; Take 1 tablet by mouth daily      Overall Impression    Chest discomfort-difficult historian. Discussed elevated baseline risk profile. Set up with stress testing. Some neuropathy issues/back pain which might limit ambulation. Dyspnea on exertion-likely multifactorial with also underlying COPD/obesity. Discussed cannot rule out possible coronary disease as contributing. See above. Follow-up echocardiogram.    Hypertension-controlled. Some issues with lower extremity edema which are likely multifactorial with medications/stasis changes. Hold Norvasc 5 mg daily. Currently on lisinopril 20 mg daily and hydrochlorothiazide 25 mg daily. Switch over to high-dose valsartan. Appears started on Lasix 20 mg daily per pulmonology and continue for now. Stressed need for diet/weight loss/exercise    Sleep apnea-stressed compliance. Return in about 5 weeks (around 4/10/2023).      Adam Mejia MD  3/6/2023  3:23 PM

## 2023-04-20 ENCOUNTER — OFFICE VISIT (OUTPATIENT)
Dept: CARDIOLOGY CLINIC | Age: 68
End: 2023-04-20
Payer: MEDICARE

## 2023-04-20 VITALS
WEIGHT: 307.5 LBS | BODY MASS INDEX: 45.54 KG/M2 | DIASTOLIC BLOOD PRESSURE: 66 MMHG | HEIGHT: 69 IN | SYSTOLIC BLOOD PRESSURE: 120 MMHG | HEART RATE: 88 BPM

## 2023-04-20 DIAGNOSIS — R07.2 PRECORDIAL PAIN: ICD-10-CM

## 2023-04-20 DIAGNOSIS — I10 ESSENTIAL HYPERTENSION: ICD-10-CM

## 2023-04-20 DIAGNOSIS — R06.09 DYSPNEA ON EXERTION: Primary | ICD-10-CM

## 2023-04-20 PROCEDURE — 99214 OFFICE O/P EST MOD 30 MIN: CPT | Performed by: INTERNAL MEDICINE

## 2023-04-20 PROCEDURE — 1123F ACP DISCUSS/DSCN MKR DOCD: CPT | Performed by: INTERNAL MEDICINE

## 2023-04-20 PROCEDURE — 3074F SYST BP LT 130 MM HG: CPT | Performed by: INTERNAL MEDICINE

## 2023-04-20 PROCEDURE — 3078F DIAST BP <80 MM HG: CPT | Performed by: INTERNAL MEDICINE

## 2023-04-20 ASSESSMENT — ENCOUNTER SYMPTOMS
SHORTNESS OF BREATH: 0
HEMOPTYSIS: 0
COUGH: 0
ORTHOPNEA: 0
BLURRED VISION: 0
DOUBLE VISION: 0
ABDOMINAL PAIN: 0
NAUSEA: 0
BLOATING: 0
VOMITING: 0
BACK PAIN: 0

## 2023-04-20 NOTE — PROGRESS NOTES
by VTI 1.8 cm2    MV Peak Gradient 16 mmHg    MV Mean Gradient 9 mmHg    MV .5 ms    MV Max Velocity 2.0 m/s    MV Mean Velocity 1.4 m/s    MV VTI 65.6 cm    MV Area by PHT 2.2 cm2    TAPSE 2.3 1.7 cm    Aortic Root 3.3 cm    Body Surface Area 2.59 m2    Fractional Shortening 2D 30 28 - 44 %    LV ESV Index BP 22 mL/m2    LV EDV Index BP 47 mL/m2    LV ESV Index A4C 23 mL/m2    LV EDV Index A4C 49 mL/m2    LV ESV Index A2C 21 mL/m2    LV EDV Index A2C 44 mL/m2    LVIDd Index 2.27 cm/m2    LVIDs Index 1.58 cm/m2    LV RWT Ratio 0.46     LV Mass 2D 296.6 (A) 88 - 224 g    LV Mass 2D Index 120.1 (A) 49 - 115 g/m2    MV E/A 0.90     E/E' Ratio (Averaged) 17.61     E/E' Lateral 14.50     E/E' Septal 20.71     LA Volume Index BP 61 (A) 16 - 34 ml/m2    LA Volume Index A/L 65 16 - 34 mL/m2    LVOT Stroke Volume Index 48.9 mL/m2    LVOT Area 4.2 cm2    LA Size Index 2.27 cm/m2    LA/AO Root Ratio 1.70     Ao Root Index 1.34 cm/m2    AV Velocity Ratio 0.46     LVOT:AV VTI Index 0.53     HONORIO/BSA VTI 0.9 cm2/m2    HONORIO/BSA Peak Velocity 0.8 cm2/m2    MV:LVOT VTI Index 2.25     LA Volume Index 2C 63 (A) 16 - 34 mL/m2    LA Volume Index 4C 52 (A) 16 - 34 mL/m2     Lab Results   Component Value Date/Time    CHOL 119 10/12/2022 03:17 PM    HDL 34 10/12/2022 03:17 PM    VLDL 33 02/17/2022 02:53 PM       No results found for any visits on 04/20/23. ASSESSMENT and PLAN    Joanna Villa was seen today for hypertension, chest pain and results. Diagnoses and all orders for this visit:    Dyspnea on exertion    Essential hypertension    Precordial pain        Overall Impression    Chest discomfort-negative Cardiolite stress test.  Discussed with monitor for. Consider invasive assessment if progressive. Some atypical symptoms. Dyspnea on exertion-likely multifactorial with also underlying COPD/obesity. See above. Aortic stenosis-likely borderline mild.   Plan repeat study in about 3 years or sooner if

## 2023-04-24 NOTE — PROGRESS NOTES
Name:  Rashid Hamilton  YOB: 1955   MRN: [id number]      Office Visit: 4/25/2023        ASSESSMENT AND PLAN:  (Medical Decision Making)    Impression: 79 y.o. male with history of smoking, mild COPD, OHS/MISTI on BiPAP plus 4 liters O2 at night only    1. Restrictive lung disease  --severe--worse. CPFTs done earlier today are discussed with patient and wife--overall decline. This could be related to volume overload, obesity (although weight is trending down), or possibly a scarring process on lungs. Will obtain new HRCT of chest to look for ILD.  - CT CHEST HIGH RESOLUTION; Future    2. MISTI treated with BiPAP  --remains on BiPAP at 4 lpm O2. NIV has been recommended in the past, but could not afford the co-pay. 3. Bilateral leg edema  --weight down 4 pounds. Some minimal improvement with HCTZ at 25 mg qd. CXR without overt CHF at last visit. 4. Obesity hypoventilation syndrome (HCC)  --chronically elevated CO2 levels on BMP. Contributing to LOPEZ. 5.  COPD, mild  --was given a trial of stiolto at last visit and did not perceive any difference in his dyspnea. Can stop Stiolto. Follow-up and Dispositions    Return in about 3 months (around 7/25/2023) for Sine/restrictive lung disease, CT prior to appt. Collaborating physician is Dr. Maycol Liriano. ADS    Juanita Thibodeaux, APRN - CNP    No specialty comments available. Total time for encounter on day of encounter was 40 minutes. This time includes chart prep, review of tests/procedures, review of other provider's notes, documentation and counseling patient regarding disease process and medications. _________________________________________________________________________    HISTORY OF PRESENT ILLNESS:    Mr. Laron Rodriguez is a 79 y.o. male who is seen at [facility] today for  COPD     The patient is a 70-year-old male who is seen for follow-up of shortness of breath and mild COPD.   He has a history of

## 2023-04-25 ENCOUNTER — OFFICE VISIT (OUTPATIENT)
Dept: PULMONOLOGY | Age: 68
End: 2023-04-25
Payer: MEDICARE

## 2023-04-25 ENCOUNTER — NURSE ONLY (OUTPATIENT)
Dept: PULMONOLOGY | Age: 68
End: 2023-04-25
Payer: MEDICARE

## 2023-04-25 VITALS
OXYGEN SATURATION: 94 % | DIASTOLIC BLOOD PRESSURE: 78 MMHG | RESPIRATION RATE: 18 BRPM | BODY MASS INDEX: 45.91 KG/M2 | TEMPERATURE: 97.2 F | WEIGHT: 310 LBS | HEIGHT: 69 IN | SYSTOLIC BLOOD PRESSURE: 147 MMHG | HEART RATE: 80 BPM

## 2023-04-25 DIAGNOSIS — J44.0 CHRONIC OBSTRUCTIVE PULMONARY DISEASE WITH ACUTE LOWER RESPIRATORY INFECTION (HCC): Primary | ICD-10-CM

## 2023-04-25 DIAGNOSIS — E66.2 OBESITY HYPOVENTILATION SYNDROME (HCC): ICD-10-CM

## 2023-04-25 DIAGNOSIS — G47.33 OSA TREATED WITH BIPAP: ICD-10-CM

## 2023-04-25 DIAGNOSIS — R60.0 BILATERAL LEG EDEMA: ICD-10-CM

## 2023-04-25 DIAGNOSIS — J44.0 CHRONIC OBSTRUCTIVE PULMONARY DISEASE WITH ACUTE LOWER RESPIRATORY INFECTION (HCC): ICD-10-CM

## 2023-04-25 DIAGNOSIS — J98.4 RESTRICTIVE LUNG DISEASE: Primary | ICD-10-CM

## 2023-04-25 LAB
FEF25-27, POC: 1 L/S
FET, POC: NORMAL
FEV 1 , POC: 1.5 L
FEV1/FVC, POC: NORMAL
FVC, POC: NORMAL
LUNG AGE, POC: NORMAL
PEF, POC: 3.54 L/S

## 2023-04-25 PROCEDURE — 94010 BREATHING CAPACITY TEST: CPT | Performed by: INTERNAL MEDICINE

## 2023-04-25 PROCEDURE — 94726 PLETHYSMOGRAPHY LUNG VOLUMES: CPT | Performed by: INTERNAL MEDICINE

## 2023-04-25 PROCEDURE — 99215 OFFICE O/P EST HI 40 MIN: CPT | Performed by: NURSE PRACTITIONER

## 2023-04-25 PROCEDURE — 3077F SYST BP >= 140 MM HG: CPT | Performed by: NURSE PRACTITIONER

## 2023-04-25 PROCEDURE — 94729 DIFFUSING CAPACITY: CPT | Performed by: INTERNAL MEDICINE

## 2023-04-25 PROCEDURE — 1123F ACP DISCUSS/DSCN MKR DOCD: CPT | Performed by: NURSE PRACTITIONER

## 2023-04-25 PROCEDURE — 3078F DIAST BP <80 MM HG: CPT | Performed by: NURSE PRACTITIONER

## 2023-04-25 NOTE — PATIENT INSTRUCTIONS
I have ordered CT scan of chest.  You should receive a call from scheduling within 2-3 business days.   If you do not hear from them, please call [phone] to schedule your test.
6

## 2023-05-16 ENCOUNTER — OFFICE VISIT (OUTPATIENT)
Dept: FAMILY MEDICINE CLINIC | Facility: CLINIC | Age: 68
End: 2023-05-16
Payer: MEDICARE

## 2023-05-16 VITALS
BODY MASS INDEX: 44.58 KG/M2 | DIASTOLIC BLOOD PRESSURE: 64 MMHG | HEIGHT: 69 IN | WEIGHT: 301 LBS | SYSTOLIC BLOOD PRESSURE: 118 MMHG

## 2023-05-16 DIAGNOSIS — F51.01 PRIMARY INSOMNIA: ICD-10-CM

## 2023-05-16 DIAGNOSIS — J44.0 CHRONIC OBSTRUCTIVE PULMONARY DISEASE WITH ACUTE LOWER RESPIRATORY INFECTION (HCC): ICD-10-CM

## 2023-05-16 DIAGNOSIS — G47.33 OSA TREATED WITH BIPAP: ICD-10-CM

## 2023-05-16 DIAGNOSIS — Z79.4 TYPE 2 DIABETES MELLITUS WITHOUT COMPLICATION, WITH LONG-TERM CURRENT USE OF INSULIN (HCC): Primary | ICD-10-CM

## 2023-05-16 DIAGNOSIS — K21.9 GASTROESOPHAGEAL REFLUX DISEASE, UNSPECIFIED WHETHER ESOPHAGITIS PRESENT: ICD-10-CM

## 2023-05-16 DIAGNOSIS — E78.00 HIGH CHOLESTEROL: ICD-10-CM

## 2023-05-16 DIAGNOSIS — F41.9 ANXIETY: ICD-10-CM

## 2023-05-16 DIAGNOSIS — R53.82 CHRONIC FATIGUE: ICD-10-CM

## 2023-05-16 DIAGNOSIS — I10 ESSENTIAL HYPERTENSION, BENIGN: ICD-10-CM

## 2023-05-16 DIAGNOSIS — E11.9 TYPE 2 DIABETES MELLITUS WITHOUT COMPLICATION, WITH LONG-TERM CURRENT USE OF INSULIN (HCC): Primary | ICD-10-CM

## 2023-05-16 PROBLEM — E11.65 TYPE 2 DIABETES MELLITUS WITH HYPERGLYCEMIA (HCC): Status: ACTIVE | Noted: 2023-05-16

## 2023-05-16 LAB
ALBUMIN SERPL-MCNC: 3.8 G/DL (ref 3.2–4.6)
ALBUMIN/GLOB SERPL: 1.3 (ref 0.4–1.6)
ALP SERPL-CCNC: 80 U/L (ref 50–136)
ALT SERPL-CCNC: 42 U/L (ref 12–65)
ANION GAP SERPL CALC-SCNC: 4 MMOL/L (ref 2–11)
AST SERPL-CCNC: 26 U/L (ref 15–37)
BASOPHILS # BLD: 0.1 K/UL (ref 0–0.2)
BASOPHILS NFR BLD: 1 % (ref 0–2)
BILIRUB SERPL-MCNC: 0.4 MG/DL (ref 0.2–1.1)
BUN SERPL-MCNC: 19 MG/DL (ref 8–23)
CALCIUM SERPL-MCNC: 9.2 MG/DL (ref 8.3–10.4)
CHLORIDE SERPL-SCNC: 103 MMOL/L (ref 101–110)
CHOLEST SERPL-MCNC: 115 MG/DL
CO2 SERPL-SCNC: 31 MMOL/L (ref 21–32)
CREAT SERPL-MCNC: 0.9 MG/DL (ref 0.8–1.5)
DIFFERENTIAL METHOD BLD: ABNORMAL
EOSINOPHIL # BLD: 0.2 K/UL (ref 0–0.8)
EOSINOPHIL NFR BLD: 2 % (ref 0.5–7.8)
ERYTHROCYTE [DISTWIDTH] IN BLOOD BY AUTOMATED COUNT: 13 % (ref 11.9–14.6)
EST. AVERAGE GLUCOSE BLD GHB EST-MCNC: 214 MG/DL
GLOBULIN SER CALC-MCNC: 2.9 G/DL (ref 2.8–4.5)
GLUCOSE SERPL-MCNC: 106 MG/DL (ref 65–100)
HBA1C MFR BLD: 9.1 % (ref 4.8–5.6)
HCT VFR BLD AUTO: 42.8 % (ref 41.1–50.3)
HDLC SERPL-MCNC: 39 MG/DL (ref 40–60)
HDLC SERPL: 2.9
HGB BLD-MCNC: 13.5 G/DL (ref 13.6–17.2)
IMM GRANULOCYTES # BLD AUTO: 0 K/UL (ref 0–0.5)
IMM GRANULOCYTES NFR BLD AUTO: 0 % (ref 0–5)
LDLC SERPL CALC-MCNC: 36.4 MG/DL
LYMPHOCYTES # BLD: 2.1 K/UL (ref 0.5–4.6)
LYMPHOCYTES NFR BLD: 27 % (ref 13–44)
MCH RBC QN AUTO: 29 PG (ref 26.1–32.9)
MCHC RBC AUTO-ENTMCNC: 31.5 G/DL (ref 31.4–35)
MCV RBC AUTO: 91.8 FL (ref 82–102)
MONOCYTES # BLD: 0.5 K/UL (ref 0.1–1.3)
MONOCYTES NFR BLD: 6 % (ref 4–12)
NEUTS SEG # BLD: 5.2 K/UL (ref 1.7–8.2)
NEUTS SEG NFR BLD: 64 % (ref 43–78)
NRBC # BLD: 0 K/UL (ref 0–0.2)
PLATELET # BLD AUTO: 208 K/UL (ref 150–450)
PMV BLD AUTO: 12 FL (ref 9.4–12.3)
POTASSIUM SERPL-SCNC: 3.9 MMOL/L (ref 3.5–5.1)
PROT SERPL-MCNC: 6.7 G/DL (ref 6.3–8.2)
RBC # BLD AUTO: 4.66 M/UL (ref 4.23–5.6)
SODIUM SERPL-SCNC: 138 MMOL/L (ref 133–143)
TRIGL SERPL-MCNC: 198 MG/DL (ref 35–150)
TSH, 3RD GENERATION: 1.07 UIU/ML (ref 0.36–3.74)
VLDLC SERPL CALC-MCNC: 39.6 MG/DL (ref 6–23)
WBC # BLD AUTO: 8.1 K/UL (ref 4.3–11.1)

## 2023-05-16 PROCEDURE — 3078F DIAST BP <80 MM HG: CPT | Performed by: FAMILY MEDICINE

## 2023-05-16 PROCEDURE — 3074F SYST BP LT 130 MM HG: CPT | Performed by: FAMILY MEDICINE

## 2023-05-16 PROCEDURE — 99214 OFFICE O/P EST MOD 30 MIN: CPT | Performed by: FAMILY MEDICINE

## 2023-05-16 PROCEDURE — 1123F ACP DISCUSS/DSCN MKR DOCD: CPT | Performed by: FAMILY MEDICINE

## 2023-05-16 SDOH — ECONOMIC STABILITY: INCOME INSECURITY: HOW HARD IS IT FOR YOU TO PAY FOR THE VERY BASICS LIKE FOOD, HOUSING, MEDICAL CARE, AND HEATING?: PATIENT DECLINED

## 2023-05-16 SDOH — ECONOMIC STABILITY: HOUSING INSECURITY
IN THE LAST 12 MONTHS, WAS THERE A TIME WHEN YOU DID NOT HAVE A STEADY PLACE TO SLEEP OR SLEPT IN A SHELTER (INCLUDING NOW)?: NO

## 2023-05-16 SDOH — ECONOMIC STABILITY: FOOD INSECURITY: WITHIN THE PAST 12 MONTHS, THE FOOD YOU BOUGHT JUST DIDN'T LAST AND YOU DIDN'T HAVE MONEY TO GET MORE.: PATIENT DECLINED

## 2023-05-16 SDOH — ECONOMIC STABILITY: FOOD INSECURITY: WITHIN THE PAST 12 MONTHS, YOU WORRIED THAT YOUR FOOD WOULD RUN OUT BEFORE YOU GOT MONEY TO BUY MORE.: PATIENT DECLINED

## 2023-05-16 ASSESSMENT — PATIENT HEALTH QUESTIONNAIRE - PHQ9
SUM OF ALL RESPONSES TO PHQ9 QUESTIONS 1 & 2: 0
SUM OF ALL RESPONSES TO PHQ QUESTIONS 1-9: 0
2. FEELING DOWN, DEPRESSED OR HOPELESS: 0
1. LITTLE INTEREST OR PLEASURE IN DOING THINGS: 0

## 2023-05-16 ASSESSMENT — ENCOUNTER SYMPTOMS
EYE DISCHARGE: 0
FACIAL SWELLING: 0
EYE PAIN: 0
RESPIRATORY NEGATIVE: 1
BLURRED VISION: 0
EYE ITCHING: 0

## 2023-05-16 ASSESSMENT — COPD QUESTIONNAIRES: COPD: 1

## 2023-05-17 NOTE — PROGRESS NOTES
[address] [place] [address]  _______________________________________  Sridevi Leo MD                 [address] [place] [address] Drive, [address]. [place], [address] [place] [address]                     Charly Springer [address]                                                                                    Phone: [phone]                                                                                    Fax: [phone]    Tea Ennis is a 79 y.o. male who is seen for evaluation of   Chief Complaint   Patient presents with    Diabetes    Hypertension    COPD    Gastroesophageal Reflux    Cholesterol Problem    Obesity    Insomnia     Increased since last visit       HPI:   Diabetes  He presents for his follow-up diabetic visit. He has type 2 diabetes mellitus. Onset time: stable on meds, need refills, no side effect noted. Pertinent negatives for diabetes include no blurred vision, no chest pain, no fatigue, no foot ulcerations, no polydipsia and no polyphagia. Hypertension  This is a chronic problem. Progression since onset: o nmeds, need refills an dlabs, no side effect noted. Pertinent negatives include no blurred vision or chest pain. COPD  Chronicity: on inhalers, and oxygen for sleep, saw pulmonary recnetly. Pertinent negatives include no chest pain. Gastroesophageal Reflux  He reports no chest pain. Chronicity: controlled on meds, need reiflls. Pertinent negatives include no fatigue. Insomnia  Episode onset: not sleeping well, feels like poor sleep quality, still on bipap and also still using oxygen, has seen pulmonarh/sleep meds renetly. Pertinent negatives include no chest pain, congestion or fatigue. Chief Complaint   Patient presents with    Diabetes    Hypertension    COPD    Gastroesophageal Reflux    Cholesterol Problem    Obesity    Insomnia     Increased since last visit         Review of Systems:    Review of Systems   Constitutional:  Negative for activity change and fatigue.    HENT:

## 2023-06-08 ENCOUNTER — HOSPITAL ENCOUNTER (OUTPATIENT)
Dept: CT IMAGING | Age: 68
Discharge: HOME | End: 2023-06-08
Payer: MEDICARE

## 2023-06-08 DIAGNOSIS — J98.4 RESTRICTIVE LUNG DISEASE: ICD-10-CM

## 2023-06-08 PROCEDURE — 71250 CT THORAX DX C-: CPT

## 2023-08-15 ENCOUNTER — OFFICE VISIT (OUTPATIENT)
Dept: PULMONOLOGY | Age: 68
End: 2023-08-15
Payer: MEDICARE

## 2023-08-15 VITALS
HEART RATE: 93 BPM | BODY MASS INDEX: 46.21 KG/M2 | HEIGHT: 69 IN | SYSTOLIC BLOOD PRESSURE: 143 MMHG | TEMPERATURE: 98 F | OXYGEN SATURATION: 91 % | DIASTOLIC BLOOD PRESSURE: 80 MMHG | WEIGHT: 312 LBS | RESPIRATION RATE: 20 BRPM

## 2023-08-15 DIAGNOSIS — J98.4 RESTRICTIVE LUNG DISEASE: Primary | ICD-10-CM

## 2023-08-15 DIAGNOSIS — J44.0 CHRONIC OBSTRUCTIVE PULMONARY DISEASE WITH ACUTE LOWER RESPIRATORY INFECTION (HCC): ICD-10-CM

## 2023-08-15 DIAGNOSIS — G47.33 OSA TREATED WITH BIPAP: ICD-10-CM

## 2023-08-15 DIAGNOSIS — R60.0 BILATERAL LEG EDEMA: ICD-10-CM

## 2023-08-15 PROCEDURE — 3079F DIAST BP 80-89 MM HG: CPT | Performed by: INTERNAL MEDICINE

## 2023-08-15 PROCEDURE — 1123F ACP DISCUSS/DSCN MKR DOCD: CPT | Performed by: INTERNAL MEDICINE

## 2023-08-15 PROCEDURE — 3077F SYST BP >= 140 MM HG: CPT | Performed by: INTERNAL MEDICINE

## 2023-08-15 PROCEDURE — 99214 OFFICE O/P EST MOD 30 MIN: CPT | Performed by: INTERNAL MEDICINE

## 2023-08-15 RX ORDER — ALBUTEROL SULFATE 90 UG/1
2 AEROSOL, METERED RESPIRATORY (INHALATION) EVERY 6 HOURS PRN
Qty: 1 EACH | Refills: 11 | Status: SHIPPED | OUTPATIENT
Start: 2023-08-15

## 2023-08-15 RX ORDER — FLUTICASONE PROPIONATE AND SALMETEROL XINAFOATE 115; 21 UG/1; UG/1
2 AEROSOL, METERED RESPIRATORY (INHALATION) 2 TIMES DAILY
Qty: 1 EACH | Refills: 11 | Status: SHIPPED | OUTPATIENT
Start: 2023-08-15

## 2023-08-15 NOTE — PROGRESS NOTES
Name:  Venkat Miranda  YOB: 1955   MRN: [id number]      Office Visit: 8/15/2023        ASSESSMENT AND PLAN:  (Medical Decision Making)    Impression: 79 y.o. male with history of smoking, mild COPD, OHS/MISTI on BiPAP plus 4 liters O2 at night    1. Restrictive lung disease  PFTs were worse during his last evaluation though there was no findings on CT scan to support an interstitial lung disease or other cause. He is not consistently on COPD medicines and felt like taking his family was take his own that his symptoms did not seem better, but cannot afford Breo previously. I am going to try to get him Advair and albuterol for his own and use this consistently for a few months. His next options for further evaluation here would be a neurologic evaluation for neuromuscular weakness causing restriction and pulmonary rehabilitation for symptom control. While his obesity likely contributes to his restriction his PFTs are clearly worse compared to several years ago at the same weight. I will also repeat his spirometry at his follow-up to reevaluate the severity of his restrictive pattern and if not improved on inhalers we can consider the neurology evaluation. 2. Chronic obstructive pulmonary disease with acute lower respiratory infection ([address])  Not consistently on any medicine due to cost.  We will reattempt Advair and albuterol as above. - fluticasone-salmeterol (ADVAIR HFA) 115-21 MCG/ACT inhaler; Inhale 2 puffs into the lungs 2 times daily  Dispense: 1 each; Refill: 11  - albuterol sulfate HFA (PROVENTIL;VENTOLIN;PROAIR) 108 (90 Base) MCG/ACT inhaler; Inhale 2 puffs into the lungs every 6 hours as needed for Wheezing  Dispense: 1 each; Refill: 11    3. MISTI treated with BiPAP  Continue BiPAP with oxygen at night. 4. Bilateral leg edema  Has continued with diuretics though recent reevaluation with cardiology seems to be stable without any progressive heart disease.     Orders Placed This

## 2023-08-15 NOTE — PATIENT INSTRUCTIONS
We understand that insurance companies have different 'preferred' medications. These preferences can change yearly and can be difficult to track. Depending upon your insurance and their preferred medicines, some medications we prescribe may be too expensive. If this happens, we recommend that you find out what inhalers for COPD or asthma are \"preferred\" on your insurance drug formulary by calling the member services phone number on the back of the insurance card. The cost of your medication can be dramatically different depending on this. Once the preferred inhalers are known, please send us a message in [facility] or call us back at [phone] with this information. We will then choose the best option available for you and send that prescription to your pharmacy on file.     ICS Inhalers:  Fluticasone inhaler (Flovent, Arnuity)  Qvar  Pulmicort  Asmanex  Alvesco    ICS/LABA Inhalers:  Fluticasone/Salmeterol inhaler (Advair, Airduo, Wixela)  Symbicort  Dulera  Breo    LABA/LAMA Inhalers:  Stiolto  Anoro  Bevespi  Duaklir    ICS/LABA/LAMA Inhalers:  Trelegy  Breztri    Short Acting Inhaler:  Albuterol (ProAir HFA/Respiclick/Digihaler, Ventolin, Proventil)  Levalbuterol (Xopenex)  Atrovent  Combivent

## 2023-09-13 DIAGNOSIS — G47.33 OSA (OBSTRUCTIVE SLEEP APNEA): ICD-10-CM

## 2023-09-14 NOTE — TELEPHONE ENCOUNTER
I have reviewed the patient's controlled substance prescription history, as maintained in the [facility], so that the prescription for a controlled substance can be given.     Felipe Nguyen MA

## 2023-09-15 RX ORDER — ZOLPIDEM TARTRATE 10 MG/1
10 TABLET ORAL NIGHTLY PRN
Qty: 30 TABLET | Refills: 3 | Status: SHIPPED | OUTPATIENT
Start: 2023-09-15 | End: 2024-01-13

## 2023-09-16 ASSESSMENT — PATIENT HEALTH QUESTIONNAIRE - PHQ9
7. TROUBLE CONCENTRATING ON THINGS, SUCH AS READING THE NEWSPAPER OR WATCHING TELEVISION: 0
9. THOUGHTS THAT YOU WOULD BE BETTER OFF DEAD, OR OF HURTING YOURSELF: 0
5. POOR APPETITE OR OVEREATING: 3
4. FEELING TIRED OR HAVING LITTLE ENERGY: NEARLY EVERY DAY
6. FEELING BAD ABOUT YOURSELF - OR THAT YOU ARE A FAILURE OR HAVE LET YOURSELF OR YOUR FAMILY DOWN: 0
SUM OF ALL RESPONSES TO PHQ QUESTIONS 1-9: 9
9. THOUGHTS THAT YOU WOULD BE BETTER OFF DEAD, OR OF HURTING YOURSELF: NOT AT ALL
7. TROUBLE CONCENTRATING ON THINGS, SUCH AS READING THE NEWSPAPER OR WATCHING TELEVISION: NOT AT ALL
SUM OF ALL RESPONSES TO PHQ9 QUESTIONS 1 & 2: 3
SUM OF ALL RESPONSES TO PHQ9 QUESTIONS 1 & 2: 3
8. MOVING OR SPEAKING SO SLOWLY THAT OTHER PEOPLE COULD HAVE NOTICED. OR THE OPPOSITE, BEING SO FIGETY OR RESTLESS THAT YOU HAVE BEEN MOVING AROUND A LOT MORE THAN USUAL: 0
3. TROUBLE FALLING OR STAYING ASLEEP: 0
4. FEELING TIRED OR HAVING LITTLE ENERGY: 3
SUM OF ALL RESPONSES TO PHQ QUESTIONS 1-9: 9
5. POOR APPETITE OR OVEREATING: NEARLY EVERY DAY
1. LITTLE INTEREST OR PLEASURE IN DOING THINGS: 3
3. TROUBLE FALLING OR STAYING ASLEEP: NOT AT ALL
2. FEELING DOWN, DEPRESSED OR HOPELESS: NOT AT ALL
8. MOVING OR SPEAKING SO SLOWLY THAT OTHER PEOPLE COULD HAVE NOTICED. OR THE OPPOSITE - BEING SO FIDGETY OR RESTLESS THAT YOU HAVE BEEN MOVING AROUND A LOT MORE THAN USUAL: NOT AT ALL
1. LITTLE INTEREST OR PLEASURE IN DOING THINGS: NEARLY EVERY DAY
10. IF YOU CHECKED OFF ANY PROBLEMS, HOW DIFFICULT HAVE THESE PROBLEMS MADE IT FOR YOU TO DO YOUR WORK, TAKE CARE OF THINGS AT HOME, OR GET ALONG WITH OTHER PEOPLE: 1
10. IF YOU CHECKED OFF ANY PROBLEMS, HOW DIFFICULT HAVE THESE PROBLEMS MADE IT FOR YOU TO DO YOUR WORK, TAKE CARE OF THINGS AT HOME, OR GET ALONG WITH OTHER PEOPLE: SOMEWHAT DIFFICULT
SUM OF ALL RESPONSES TO PHQ QUESTIONS 1-9: 9
SUM OF ALL RESPONSES TO PHQ QUESTIONS 1-9: 9
6. FEELING BAD ABOUT YOURSELF - OR THAT YOU ARE A FAILURE OR HAVE LET YOURSELF OR YOUR FAMILY DOWN: NOT AT ALL
SUM OF ALL RESPONSES TO PHQ QUESTIONS 1-9: 9
2. FEELING DOWN, DEPRESSED OR HOPELESS: 0

## 2023-09-18 ENCOUNTER — OFFICE VISIT (OUTPATIENT)
Dept: FAMILY MEDICINE CLINIC | Facility: CLINIC | Age: 68
End: 2023-09-18
Payer: MEDICARE

## 2023-09-18 VITALS — WEIGHT: 306 LBS | HEIGHT: 69 IN | BODY MASS INDEX: 45.32 KG/M2

## 2023-09-18 DIAGNOSIS — G47.33 OSA TREATED WITH BIPAP: ICD-10-CM

## 2023-09-18 DIAGNOSIS — R30.0 DYSURIA: ICD-10-CM

## 2023-09-18 DIAGNOSIS — E11.65 TYPE 2 DIABETES MELLITUS WITH HYPERGLYCEMIA, WITHOUT LONG-TERM CURRENT USE OF INSULIN (HCC): ICD-10-CM

## 2023-09-18 DIAGNOSIS — E11.9 TYPE 2 DIABETES MELLITUS WITHOUT COMPLICATION, WITH LONG-TERM CURRENT USE OF INSULIN (HCC): Primary | ICD-10-CM

## 2023-09-18 DIAGNOSIS — E78.00 HIGH CHOLESTEROL: ICD-10-CM

## 2023-09-18 DIAGNOSIS — I10 ESSENTIAL HYPERTENSION, BENIGN: ICD-10-CM

## 2023-09-18 DIAGNOSIS — F41.9 ANXIETY: ICD-10-CM

## 2023-09-18 DIAGNOSIS — E66.01 OBESITY, CLASS III, BMI 40-49.9 (MORBID OBESITY) (HCC): ICD-10-CM

## 2023-09-18 DIAGNOSIS — K21.9 GASTROESOPHAGEAL REFLUX DISEASE, UNSPECIFIED WHETHER ESOPHAGITIS PRESENT: ICD-10-CM

## 2023-09-18 DIAGNOSIS — J44.0 CHRONIC OBSTRUCTIVE PULMONARY DISEASE WITH ACUTE LOWER RESPIRATORY INFECTION (HCC): ICD-10-CM

## 2023-09-18 DIAGNOSIS — Z79.4 TYPE 2 DIABETES MELLITUS WITHOUT COMPLICATION, WITH LONG-TERM CURRENT USE OF INSULIN (HCC): Primary | ICD-10-CM

## 2023-09-18 LAB
BASOPHILS # BLD: 0.1 K/UL (ref 0–0.2)
BASOPHILS NFR BLD: 1 % (ref 0–2)
BILIRUBIN, URINE, POC: NEGATIVE
BLOOD URINE, POC: NEGATIVE
DIFFERENTIAL METHOD BLD: ABNORMAL
EOSINOPHIL # BLD: 0.1 K/UL (ref 0–0.8)
EOSINOPHIL NFR BLD: 2 % (ref 0.5–7.8)
ERYTHROCYTE [DISTWIDTH] IN BLOOD BY AUTOMATED COUNT: 12.2 % (ref 11.9–14.6)
GLUCOSE URINE, POC: 500
HCT VFR BLD AUTO: 41.7 % (ref 41.1–50.3)
HGB BLD-MCNC: 13.4 G/DL (ref 13.6–17.2)
IMM GRANULOCYTES # BLD AUTO: 0.1 K/UL (ref 0–0.5)
IMM GRANULOCYTES NFR BLD AUTO: 1 % (ref 0–5)
KETONES, URINE, POC: NEGATIVE
LEUKOCYTE ESTERASE, URINE, POC: NEGATIVE
LYMPHOCYTES # BLD: 1.8 K/UL (ref 0.5–4.6)
LYMPHOCYTES NFR BLD: 22 % (ref 13–44)
MCH RBC QN AUTO: 28.9 PG (ref 26.1–32.9)
MCHC RBC AUTO-ENTMCNC: 32.1 G/DL (ref 31.4–35)
MCV RBC AUTO: 89.9 FL (ref 82–102)
MONOCYTES # BLD: 0.7 K/UL (ref 0.1–1.3)
MONOCYTES NFR BLD: 9 % (ref 4–12)
NEUTS SEG # BLD: 5.2 K/UL (ref 1.7–8.2)
NEUTS SEG NFR BLD: 65 % (ref 43–78)
NITRITE, URINE, POC: NEGATIVE
NRBC # BLD: 0 K/UL (ref 0–0.2)
PH, URINE, POC: 8 (ref 4.6–8)
PLATELET # BLD AUTO: 203 K/UL (ref 150–450)
PMV BLD AUTO: 11.4 FL (ref 9.4–12.3)
PROTEIN,URINE, POC: NEGATIVE
RBC # BLD AUTO: 4.64 M/UL (ref 4.23–5.6)
SPECIFIC GRAVITY, URINE, POC: 1.01 (ref 1–1.03)
URINALYSIS CLARITY, POC: CLEAR
URINALYSIS COLOR, POC: YELLOW
UROBILINOGEN, POC: NORMAL
WBC # BLD AUTO: 8 K/UL (ref 4.3–11.1)

## 2023-09-18 PROCEDURE — 81003 URINALYSIS AUTO W/O SCOPE: CPT | Performed by: FAMILY MEDICINE

## 2023-09-18 PROCEDURE — 99214 OFFICE O/P EST MOD 30 MIN: CPT | Performed by: FAMILY MEDICINE

## 2023-09-18 PROCEDURE — 1123F ACP DISCUSS/DSCN MKR DOCD: CPT | Performed by: FAMILY MEDICINE

## 2023-09-18 PROCEDURE — 3046F HEMOGLOBIN A1C LEVEL >9.0%: CPT | Performed by: FAMILY MEDICINE

## 2023-09-18 ASSESSMENT — ENCOUNTER SYMPTOMS
RECTAL PAIN: 0
VOMITING: 0
BACK PAIN: 0
EYE PAIN: 0
EYE REDNESS: 0
EYE ITCHING: 0
ABDOMINAL PAIN: 1
DIARRHEA: 1
BLOOD IN STOOL: 0
ANAL BLEEDING: 0
CHEST TIGHTNESS: 0
NAUSEA: 0
EYE DISCHARGE: 0
FACIAL SWELLING: 0
COUGH: 0
SINUS PRESSURE: 0
ABDOMINAL DISTENTION: 0
SINUS PAIN: 0
APNEA: 0
RHINORRHEA: 0

## 2023-09-19 LAB
ALBUMIN SERPL-MCNC: 3.8 G/DL (ref 3.2–4.6)
ALBUMIN/GLOB SERPL: 1.2 (ref 0.4–1.6)
ALP SERPL-CCNC: 90 U/L (ref 50–136)
ALT SERPL-CCNC: 53 U/L (ref 12–65)
ANION GAP SERPL CALC-SCNC: 5 MMOL/L (ref 2–11)
AST SERPL-CCNC: 41 U/L (ref 15–37)
BILIRUB SERPL-MCNC: 0.3 MG/DL (ref 0.2–1.1)
BUN SERPL-MCNC: 10 MG/DL (ref 8–23)
CALCIUM SERPL-MCNC: 9.6 MG/DL (ref 8.3–10.4)
CHLORIDE SERPL-SCNC: 101 MMOL/L (ref 101–110)
CHOLEST SERPL-MCNC: 126 MG/DL
CO2 SERPL-SCNC: 32 MMOL/L (ref 21–32)
CREAT SERPL-MCNC: 1 MG/DL (ref 0.8–1.5)
EST. AVERAGE GLUCOSE BLD GHB EST-MCNC: 226 MG/DL
GLOBULIN SER CALC-MCNC: 3.3 G/DL (ref 2.8–4.5)
GLUCOSE SERPL-MCNC: 244 MG/DL (ref 65–100)
HBA1C MFR BLD: 9.5 % (ref 4.8–5.6)
HDLC SERPL-MCNC: 38 MG/DL (ref 40–60)
HDLC SERPL: 3.3
LDLC SERPL CALC-MCNC: 54.4 MG/DL
POTASSIUM SERPL-SCNC: 4.8 MMOL/L (ref 3.5–5.1)
PROT SERPL-MCNC: 7.1 G/DL (ref 6.3–8.2)
SODIUM SERPL-SCNC: 138 MMOL/L (ref 133–143)
TRIGL SERPL-MCNC: 168 MG/DL (ref 35–150)
VLDLC SERPL CALC-MCNC: 33.6 MG/DL (ref 6–23)

## 2023-10-16 ENCOUNTER — OFFICE VISIT (OUTPATIENT)
Age: 68
End: 2023-10-16
Payer: MEDICARE

## 2023-10-16 VITALS
DIASTOLIC BLOOD PRESSURE: 80 MMHG | WEIGHT: 315 LBS | HEART RATE: 95 BPM | HEIGHT: 69 IN | BODY MASS INDEX: 46.65 KG/M2 | SYSTOLIC BLOOD PRESSURE: 126 MMHG

## 2023-10-16 DIAGNOSIS — R07.2 PRECORDIAL PAIN: ICD-10-CM

## 2023-10-16 DIAGNOSIS — R06.09 DYSPNEA ON EXERTION: ICD-10-CM

## 2023-10-16 DIAGNOSIS — I10 ESSENTIAL HYPERTENSION: Primary | ICD-10-CM

## 2023-10-16 DIAGNOSIS — I35.0 NONRHEUMATIC AORTIC VALVE STENOSIS: ICD-10-CM

## 2023-10-16 PROCEDURE — 3074F SYST BP LT 130 MM HG: CPT | Performed by: INTERNAL MEDICINE

## 2023-10-16 PROCEDURE — 3079F DIAST BP 80-89 MM HG: CPT | Performed by: INTERNAL MEDICINE

## 2023-10-16 PROCEDURE — 93000 ELECTROCARDIOGRAM COMPLETE: CPT | Performed by: INTERNAL MEDICINE

## 2023-10-16 PROCEDURE — 1123F ACP DISCUSS/DSCN MKR DOCD: CPT | Performed by: INTERNAL MEDICINE

## 2023-10-16 PROCEDURE — 99214 OFFICE O/P EST MOD 30 MIN: CPT | Performed by: INTERNAL MEDICINE

## 2023-10-16 ASSESSMENT — ENCOUNTER SYMPTOMS
DOUBLE VISION: 0
NAUSEA: 0
BLOATING: 0
SHORTNESS OF BREATH: 0
BACK PAIN: 0
ORTHOPNEA: 0
HEMOPTYSIS: 0
VOMITING: 0
COUGH: 0
BLURRED VISION: 0
ABDOMINAL PAIN: 0

## 2023-10-16 NOTE — PROGRESS NOTES
[facility]  [address] [facility], [address]  PHONE: [phone]    10/16/23    NAME:  Mirtha Daniels  :   MRN: [id number]         SUBJECTIVE:   Mirtha Daniels is a 79 y.o. male seen for a visit regarding the following:     Chief Complaint   Patient presents with    Hypertension       HPI:      No prior history of coronary disease. History of COPD, MISTI on BiPAP and sees pulmonology, hypertension, diabetes mellitus (A1C 9.5; 2022-sees endocrine). Echo from 2018 with preserved EF and normal diastolic function. Prior cath from 2018 with no sig CAD. Negative Cardiolite stress test on 3/23/2023. Echocardiogram with preserved EF borderline mild aortic stenosis [DI ~0.53]    Intermittent chest tightness/pressure; some intermittent arm tingling but states has problems with likely radiculopathy. Mostly noted when he lies down. No exertional component. Also some increased LOPEZ; also states some dyspnea with getting excited. Stable symptoms. Improved lower extremity edema stopping Norvasc. Denies any PND/orthopnea. Well controlled home Bps    Edema-stable, hypertension-controlled, dyspnea-stable, chest discomfort-negative stress    Past Medical History, Past Surgical History, Family history, Social History, and Medications were all reviewed with the patient today and updated as necessary. Allergies   Allergen Reactions    Adhesive Tape Hives     bandaids       Patient Active Problem List   Diagnosis    Spinal stenosis of lumbar region with neurogenic claudication    Hypoxemia    Essential hypertension, benign    Spinal stenosis, lumbar region, with neurogenic claudication    Chest pain    Obesity (BMI 30-39. 9)    High cholesterol    COPD (chronic obstructive pulmonary disease) (HCC)    Hypocalcemia    Type 2 diabetes mellitus without complication, with long-term current use of insulin (HCC)    LOPEZ (dyspnea on exertion)    Obesity, Class III, BMI

## 2023-10-31 NOTE — TELEPHONE ENCOUNTER
Called patient results of blood gas on room air today are below.   Will need to work with his current DME company ([employer]) to see if we can get him on NIV rather than his BiPAP) Occupational Therapy    Patient not seen in therapy.     Patient with other health care provider    As able      OBJECTIVE                          Therapy procedure time and total treatment time can be found documented on the Time Entry flowsheet

## 2023-11-15 ENCOUNTER — OFFICE VISIT (OUTPATIENT)
Dept: PULMONOLOGY | Age: 68
End: 2023-11-15
Payer: MEDICARE

## 2023-11-15 VITALS
BODY MASS INDEX: 46.65 KG/M2 | DIASTOLIC BLOOD PRESSURE: 80 MMHG | SYSTOLIC BLOOD PRESSURE: 150 MMHG | RESPIRATION RATE: 20 BRPM | TEMPERATURE: 98 F | WEIGHT: 315 LBS | HEART RATE: 99 BPM | HEIGHT: 69 IN | OXYGEN SATURATION: 94 %

## 2023-11-15 DIAGNOSIS — J96.12 CHRONIC RESPIRATORY FAILURE WITH HYPOXIA AND HYPERCAPNIA (HCC): ICD-10-CM

## 2023-11-15 DIAGNOSIS — J96.11 CHRONIC RESPIRATORY FAILURE WITH HYPOXIA AND HYPERCAPNIA (HCC): ICD-10-CM

## 2023-11-15 DIAGNOSIS — J44.0 CHRONIC OBSTRUCTIVE PULMONARY DISEASE WITH ACUTE LOWER RESPIRATORY INFECTION (HCC): ICD-10-CM

## 2023-11-15 DIAGNOSIS — J98.4 RESTRICTIVE LUNG DISEASE: Primary | ICD-10-CM

## 2023-11-15 DIAGNOSIS — G47.33 OSA TREATED WITH BIPAP: ICD-10-CM

## 2023-11-15 LAB
EXPIRATORY TIME: NORMAL
FEF 25-75% %PRED-PRE: NORMAL
FEF 25-75% PRED: NORMAL
FEF 25-75%-PRE: NORMAL
FEV1 %PRED-PRE: 56 %
FEV1 PRED: NORMAL
FEV1/FVC %PRED-PRE: NORMAL
FEV1/FVC PRED: NORMAL
FEV1/FVC: 77 %
FEV1: 1.83 L
FVC %PRED-PRE: 56 %
FVC PRED: NORMAL
FVC: 2.38 L
PEF %PRED-PRE: NORMAL
PEF PRED: NORMAL
PEF-PRE: NORMAL

## 2023-11-15 PROCEDURE — 3077F SYST BP >= 140 MM HG: CPT | Performed by: INTERNAL MEDICINE

## 2023-11-15 PROCEDURE — 94010 BREATHING CAPACITY TEST: CPT | Performed by: INTERNAL MEDICINE

## 2023-11-15 PROCEDURE — 1123F ACP DISCUSS/DSCN MKR DOCD: CPT | Performed by: INTERNAL MEDICINE

## 2023-11-15 PROCEDURE — 99214 OFFICE O/P EST MOD 30 MIN: CPT | Performed by: INTERNAL MEDICINE

## 2023-11-15 PROCEDURE — 3079F DIAST BP 80-89 MM HG: CPT | Performed by: INTERNAL MEDICINE

## 2023-11-15 RX ORDER — FLUTICASONE PROPIONATE AND SALMETEROL XINAFOATE 115; 21 UG/1; UG/1
2 AEROSOL, METERED RESPIRATORY (INHALATION) 2 TIMES DAILY
Qty: 1 EACH | Refills: 11 | Status: SHIPPED | OUTPATIENT
Start: 2023-11-15

## 2023-11-15 ASSESSMENT — PULMONARY FUNCTION TESTS
FVC: 2.38
FVC_PERCENT_PREDICTED_PRE: 56
FEV1/FVC: 77
FEV1: 1.83
FEV1_PERCENT_PREDICTED_PRE: 56

## 2023-11-15 NOTE — PROGRESS NOTES
min: 95 %  Percent Retention at 120 min: 24% (NR: 2.7 - 49.8%)  Percent Retention at 180 min: 5 %    Slightly increased lag time and slightly delayed emptying at 60 minutes (normal <90% retention at 60 minutes). However, normal gastric emptying at 120 and 180 minutes suggesting overall normal gastric emptying. Impression  1. Normal solid food gastric emptying.      _______________________________  REFERENCE:    Delayed gastric emptying:  > 90% at 60 minutes. > 60% at 120 minutes. > 30% at 180 minutes. > 10% at 240 minutes. Severity of delayed gastric emptying:  Grade 1 (mild): 11 - 20 % retention at 4 hours. Grade 2 (moderate): 21 - 35 % retention at 4 hours. Grade 3 (severe): 36 - 50 % retention at 4 hours. Grade 4 (very severe): > 50 % retention at 4 hours. Rapid gastric emptying:  < 70 % retention at 30 minutes. < 30 % retention at 60 minutes. References:  1. Consensus Recommendations for Gastric Emptying Scintigraphy: A Joint Report of The Society of Nuclear Medicine and The American Neurogastroenterology and Motility Society: (April 23, 2007)  2. Consensus Recommendations for Gastric Emptying Scintigraphy: A Joint Report of the American Neurogastroenterology and Motility Society and the Society of Nuclear Medicine. Bianca Traore Gastroentero(2008);103: [phone]. Signed by: 10/6/2021 11:39 AM: Thomas Gant    PFTs:       Latest Ref Rng & Units 11/15/2023    12:00 AM   Office Spirometry Results   FVC L 2.38    FEV1 L 1.83    FEV1 %Pred-Pre % 56    FVC %Pred-Pre % 56    FEV1/FVC % 77          No results found for this or any previous visit. No results found for this or any previous visit. FeNO: No results found for this or any previous visit.   FeNO and Likelihood of Eosinophilic Asthma   Unlikely Intermediate Likely   <25 ppb 25-50 ppb >50ppb     Exercise Oximetry:    Echo:   TRANSTHORACIC ECHOCARDIOGRAM (TTE) COMPLETE (CONTRAST/BUBBLE/3D PRN) 03/28/2023    Interpretation Summary    Left

## 2024-01-15 DIAGNOSIS — G47.00 INSOMNIA, UNSPECIFIED TYPE: Primary | ICD-10-CM

## 2024-01-15 NOTE — TELEPHONE ENCOUNTER
Patient's next appt is on 5/17/24. He was just recently seen in pulmonary.   I have reviewed the patient's controlled substance prescription history, as maintained in the [place] Prescription Monitoring Program, so that the prescription for a controlled substance can be given.

## 2024-01-15 NOTE — TELEPHONE ENCOUNTER
Patient says he needs refill for the Ambien. Says he did not know that he needed an appt in 6 months . I can't get him in until May

## 2024-01-16 ENCOUNTER — TELEPHONE (OUTPATIENT)
Dept: PULMONOLOGY | Age: 69
End: 2024-01-16

## 2024-01-16 DIAGNOSIS — G47.00 INSOMNIA, UNSPECIFIED TYPE: ICD-10-CM

## 2024-01-16 RX ORDER — ZOLPIDEM TARTRATE 10 MG/1
10 TABLET ORAL NIGHTLY PRN
Qty: 30 TABLET | Refills: 3 | OUTPATIENT
Start: 2024-01-16 | End: 2024-05-15

## 2024-01-16 RX ORDER — ZOLPIDEM TARTRATE 10 MG/1
10 TABLET ORAL NIGHTLY PRN
Qty: 30 TABLET | Refills: 3 | Status: SHIPPED | OUTPATIENT
Start: 2024-01-16 | End: 2024-05-15

## 2024-01-16 NOTE — TELEPHONE ENCOUNTER
Patient  called refill line he states he is out of ambien  10 mg he states he has appointment 05/17/24 that is the only appointment he could get he is out and ask to please refill to his [facility] pharmacy... doug eli

## 2024-01-18 ENCOUNTER — OFFICE VISIT (OUTPATIENT)
Dept: FAMILY MEDICINE CLINIC | Facility: CLINIC | Age: 69
End: 2024-01-18

## 2024-01-18 VITALS
HEIGHT: 69 IN | SYSTOLIC BLOOD PRESSURE: 138 MMHG | WEIGHT: 307 LBS | BODY MASS INDEX: 45.47 KG/M2 | DIASTOLIC BLOOD PRESSURE: 80 MMHG

## 2024-01-18 DIAGNOSIS — Z00.00 ROUTINE GENERAL MEDICAL EXAMINATION AT A HEALTH CARE FACILITY: ICD-10-CM

## 2024-01-18 DIAGNOSIS — Z79.4 TYPE 2 DIABETES MELLITUS WITHOUT COMPLICATION, WITH LONG-TERM CURRENT USE OF INSULIN (HCC): Primary | ICD-10-CM

## 2024-01-18 DIAGNOSIS — G47.33 OSA TREATED WITH BIPAP: ICD-10-CM

## 2024-01-18 DIAGNOSIS — R53.83 FATIGUE, UNSPECIFIED TYPE: ICD-10-CM

## 2024-01-18 DIAGNOSIS — F41.9 ANXIETY: ICD-10-CM

## 2024-01-18 DIAGNOSIS — K21.9 GASTROESOPHAGEAL REFLUX DISEASE, UNSPECIFIED WHETHER ESOPHAGITIS PRESENT: ICD-10-CM

## 2024-01-18 DIAGNOSIS — J44.0 CHRONIC OBSTRUCTIVE PULMONARY DISEASE WITH ACUTE LOWER RESPIRATORY INFECTION (HCC): ICD-10-CM

## 2024-01-18 DIAGNOSIS — E55.9 VITAMIN D DEFICIENCY: ICD-10-CM

## 2024-01-18 DIAGNOSIS — E78.00 HIGH CHOLESTEROL: ICD-10-CM

## 2024-01-18 DIAGNOSIS — E11.65 TYPE 2 DIABETES MELLITUS WITH HYPERGLYCEMIA, WITHOUT LONG-TERM CURRENT USE OF INSULIN (HCC): ICD-10-CM

## 2024-01-18 DIAGNOSIS — I10 ESSENTIAL HYPERTENSION, BENIGN: ICD-10-CM

## 2024-01-18 DIAGNOSIS — E83.42 HYPOMAGNESEMIA: ICD-10-CM

## 2024-01-18 DIAGNOSIS — E11.9 TYPE 2 DIABETES MELLITUS WITHOUT COMPLICATION, WITH LONG-TERM CURRENT USE OF INSULIN (HCC): Primary | ICD-10-CM

## 2024-01-18 LAB
BASOPHILS # BLD: 0.1 K/UL (ref 0–0.2)
BASOPHILS NFR BLD: 1 % (ref 0–2)
DIFFERENTIAL METHOD BLD: ABNORMAL
EOSINOPHIL # BLD: 0.2 K/UL (ref 0–0.8)
EOSINOPHIL NFR BLD: 2 % (ref 0.5–7.8)
ERYTHROCYTE [DISTWIDTH] IN BLOOD BY AUTOMATED COUNT: 12.6 % (ref 11.9–14.6)
HCT VFR BLD AUTO: 40.7 % (ref 41.1–50.3)
HGB BLD-MCNC: 13.3 G/DL (ref 13.6–17.2)
IMM GRANULOCYTES # BLD AUTO: 0.1 K/UL (ref 0–0.5)
IMM GRANULOCYTES NFR BLD AUTO: 1 % (ref 0–5)
LYMPHOCYTES # BLD: 1.7 K/UL (ref 0.5–4.6)
LYMPHOCYTES NFR BLD: 21 % (ref 13–44)
MCH RBC QN AUTO: 29.3 PG (ref 26.1–32.9)
MCHC RBC AUTO-ENTMCNC: 32.7 G/DL (ref 31.4–35)
MCV RBC AUTO: 89.6 FL (ref 82–102)
MONOCYTES # BLD: 0.6 K/UL (ref 0.1–1.3)
MONOCYTES NFR BLD: 8 % (ref 4–12)
NEUTS SEG # BLD: 5.3 K/UL (ref 1.7–8.2)
NEUTS SEG NFR BLD: 67 % (ref 43–78)
NRBC # BLD: 0 K/UL (ref 0–0.2)
PLATELET # BLD AUTO: 233 K/UL (ref 150–450)
PMV BLD AUTO: 11.3 FL (ref 9.4–12.3)
RBC # BLD AUTO: 4.54 M/UL (ref 4.23–5.6)
WBC # BLD AUTO: 7.9 K/UL (ref 4.3–11.1)

## 2024-01-18 RX ORDER — TAMSULOSIN HYDROCHLORIDE 0.4 MG/1
CAPSULE ORAL
Qty: 90 CAPSULE | Refills: 3 | OUTPATIENT
Start: 2024-01-18

## 2024-01-18 ASSESSMENT — PATIENT HEALTH QUESTIONNAIRE - PHQ9
SUM OF ALL RESPONSES TO PHQ9 QUESTIONS 1 & 2: 0
SUM OF ALL RESPONSES TO PHQ QUESTIONS 1-9: 0
1. LITTLE INTEREST OR PLEASURE IN DOING THINGS: 0
SUM OF ALL RESPONSES TO PHQ QUESTIONS 1-9: 0
2. FEELING DOWN, DEPRESSED OR HOPELESS: 0
SUM OF ALL RESPONSES TO PHQ QUESTIONS 1-9: 0
SUM OF ALL RESPONSES TO PHQ QUESTIONS 1-9: 0

## 2024-01-18 ASSESSMENT — ENCOUNTER SYMPTOMS
COUGH: 0
FACIAL SWELLING: 0
SINUS PRESSURE: 0
ABDOMINAL PAIN: 0
BACK PAIN: 0
CHEST TIGHTNESS: 0
BLURRED VISION: 0
EYE DISCHARGE: 0
APNEA: 0
EYE ITCHING: 0
BLOOD IN STOOL: 0
SINUS PAIN: 0
RHINORRHEA: 0
ABDOMINAL DISTENTION: 0
EYE REDNESS: 0
EYE PAIN: 0
ANAL BLEEDING: 0

## 2024-01-18 ASSESSMENT — COPD QUESTIONNAIRES: COPD: 1

## 2024-01-18 ASSESSMENT — LIFESTYLE VARIABLES
HOW OFTEN DO YOU HAVE A DRINK CONTAINING ALCOHOL: MONTHLY OR LESS
HOW MANY STANDARD DRINKS CONTAINING ALCOHOL DO YOU HAVE ON A TYPICAL DAY: 1 OR 2

## 2024-01-19 LAB
25(OH)D3 SERPL-MCNC: 23.9 NG/ML (ref 30–100)
ALBUMIN SERPL-MCNC: 3.8 G/DL (ref 3.2–4.6)
ALBUMIN/GLOB SERPL: 1.2 (ref 0.4–1.6)
ALP SERPL-CCNC: 89 U/L (ref 50–136)
ALT SERPL-CCNC: 52 U/L (ref 12–65)
ANION GAP SERPL CALC-SCNC: 6 MMOL/L (ref 2–11)
AST SERPL-CCNC: 39 U/L (ref 15–37)
BILIRUB SERPL-MCNC: 0.3 MG/DL (ref 0.2–1.1)
BUN SERPL-MCNC: 14 MG/DL (ref 8–23)
CALCIUM SERPL-MCNC: 9.7 MG/DL (ref 8.3–10.4)
CHLORIDE SERPL-SCNC: 101 MMOL/L (ref 103–113)
CHOLEST SERPL-MCNC: 137 MG/DL
CO2 SERPL-SCNC: 29 MMOL/L (ref 21–32)
CREAT SERPL-MCNC: 1.1 MG/DL (ref 0.8–1.5)
EST. AVERAGE GLUCOSE BLD GHB EST-MCNC: 220 MG/DL
GLOBULIN SER CALC-MCNC: 3.1 G/DL (ref 2.8–4.5)
GLUCOSE SERPL-MCNC: 233 MG/DL (ref 65–100)
HBA1C MFR BLD: 9.3 % (ref 4.8–5.6)
HDLC SERPL-MCNC: 38 MG/DL (ref 40–60)
HDLC SERPL: 3.6
LDLC SERPL CALC-MCNC: 50.4 MG/DL
MAGNESIUM SERPL-MCNC: 1.8 MG/DL (ref 1.8–2.4)
POTASSIUM SERPL-SCNC: 4.2 MMOL/L (ref 3.5–5.1)
PROT SERPL-MCNC: 6.9 G/DL (ref 6.3–8.2)
SODIUM SERPL-SCNC: 136 MMOL/L (ref 136–146)
TRIGL SERPL-MCNC: 243 MG/DL (ref 35–150)
TSH, 3RD GENERATION: 0.87 UIU/ML (ref 0.36–3.74)
VLDLC SERPL CALC-MCNC: 48.6 MG/DL (ref 6–23)

## 2024-01-19 NOTE — PROGRESS NOTES
[facility]  _______________________________________  Talha Mooney MD                 [address] [place] [address]        Dwight Faith [place] [address]                                                                                    Phone: [phone]                                                                                    Fax: [phone]    Constantine Jacob is a 68 y.o. male who is seen for evaluation of   Chief Complaint   Patient presents with   • Medicare AWV   • Diabetes   • Gastroesophageal Reflux   • COPD   • Sleep Apnea   • Cholesterol Problem   • Obesity   • Hypertension       HPI:   Diabetes  He presents for his follow-up diabetic visit. He has type 2 diabetes mellitus. Onset time: on meds, need refills and labs, no side effect noted. Pertinent negatives for hypoglycemia include no confusion, dizziness, headaches, nervousness/anxiousness or pallor. Pertinent negatives for diabetes include no blurred vision, no chest pain, no fatigue, no foot paresthesias, no foot ulcerations and no polyphagia.   Gastroesophageal Reflux  He reports no abdominal pain, no chest pain or no coughing. Chronicity: on meds, no breakthrough noted. Pertinent negatives include no fatigue.   COPD  There is no cough. Chronicity: cough and congestion, not on meds all the time. Pertinent negatives include no appetite change, chest pain, ear pain, fever, headaches, myalgias or rhinorrhea.   Hypertension  This is a chronic problem. Progression since onset: stable o nmeds, no side effect note. Pertinent negatives include no blurred vision, chest pain or headaches.    Chief Complaint   Patient presents with   • Medicare AWV   • Diabetes   • Gastroesophageal Reflux   • COPD   • Sleep Apnea   • Cholesterol Problem   • Obesity   • Hypertension         Review of Systems:    Review of Systems   Constitutional:  Negative for activity change, appetite change, chills, diaphoresis,

## 2024-01-19 NOTE — PROGRESS NOTES
Medicare Annual Wellness Visit    Constantine Jacob is here for Medicare AWV, Diabetes, Gastroesophageal Reflux, COPD, Sleep Apnea, Cholesterol Problem, Obesity, and Hypertension    Assessment & Plan   Type 2 diabetes mellitus without complication, with long-term current use of insulin ([facility])  -     Comprehensive Metabolic Panel; Future  -     Hemoglobin A1C; Future  Body mass index (BMI) 45.0-49.9, adult ([facility])  Chronic obstructive pulmonary disease with acute lower respiratory infection ([facility])  Assessment & Plan:   Asymptomatic, continue current medications, no recent flare of breathing issues  Type 2 diabetes mellitus with hyperglycemia, without long-term current use of insulin ([facility])  Assessment & Plan:   Borderline controlled, continue current medications, need to continue meds and check labs  Gastroesophageal reflux disease, unspecified whether esophagitis present  MISTI treated with BiPAP  Anxiety  Essential hypertension, benign  -     CBC with Auto Differential; Future  -     Comprehensive Metabolic Panel; Future  Hypomagnesemia  -     Magnesium; Future  Vitamin D deficiency  -     Vitamin D 25 Hydroxy; Future  Fatigue, unspecified type  -     TSH; Future  High cholesterol  -     Lipid Panel; Future  Routine general medical examination at a health care facility    Recommendations for Preventive Services Due: see orders and patient instructions/AVS.  Recommended screening schedule for the next 5-10 years is provided to the patient in written form: see Patient Instructions/AVS.     Return for Medicare Annual Wellness Visit in 1 year.     Subjective   The following acute and/or chronic problems were also addressed today:  Still has neuropathy issues and also cervical radiculopathy issues. 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    Patient's complete Health Risk Assessment and screening values have been reviewed and

## 2024-01-19 NOTE — PATIENT INSTRUCTIONS

## 2024-01-24 DIAGNOSIS — E55.9 VITAMIN D DEFICIENCY: Primary | ICD-10-CM

## 2024-01-24 RX ORDER — ERGOCALCIFEROL 1.25 MG/1
50000 CAPSULE ORAL WEEKLY
Qty: 12 CAPSULE | Refills: 1 | Status: SHIPPED | OUTPATIENT
Start: 2024-01-24

## 2024-01-24 NOTE — TELEPHONE ENCOUNTER
----- Message from Talha Mooney MD sent at 1/19/2024  1:09 PM EST -----  Labs stable except vit D is low, send 50k x 6 months  Other labs are fine except sugar avg is really high, still needs to work on diet and exercise fro this

## 2024-01-24 NOTE — TELEPHONE ENCOUNTER
Pt notified of lab results.     Requested Prescriptions     Pending Prescriptions Disp Refills    vitamin D (ERGOCALCIFEROL) 1.25 MG (65719 UT) CAPS capsule 12 capsule 1     Sig: Take 1 capsule by mouth once a week

## 2024-01-26 ENCOUNTER — OFFICE VISIT (OUTPATIENT)
Dept: FAMILY MEDICINE CLINIC | Facility: CLINIC | Age: 69
End: 2024-01-26

## 2024-01-26 VITALS — WEIGHT: 313 LBS | HEIGHT: 69 IN | BODY MASS INDEX: 46.36 KG/M2

## 2024-01-26 DIAGNOSIS — Z79.4 TYPE 2 DIABETES MELLITUS WITHOUT COMPLICATION, WITH LONG-TERM CURRENT USE OF INSULIN (HCC): ICD-10-CM

## 2024-01-26 DIAGNOSIS — L03.119 CELLULITIS OF FOOT: Primary | ICD-10-CM

## 2024-01-26 DIAGNOSIS — E11.9 TYPE 2 DIABETES MELLITUS WITHOUT COMPLICATION, WITH LONG-TERM CURRENT USE OF INSULIN (HCC): ICD-10-CM

## 2024-01-26 DIAGNOSIS — L84 CALLUS: ICD-10-CM

## 2024-01-26 DIAGNOSIS — L03.119 CELLULITIS OF FOOT: ICD-10-CM

## 2024-01-26 DIAGNOSIS — I10 ESSENTIAL HYPERTENSION, BENIGN: Primary | ICD-10-CM

## 2024-01-26 RX ORDER — UREA 40 %
CREAM (GRAM) TOPICAL DAILY
Qty: 85 G | Refills: 0 | Status: SHIPPED | OUTPATIENT
Start: 2024-01-26

## 2024-01-26 RX ORDER — VALSARTAN AND HYDROCHLOROTHIAZIDE 320; 25 MG/1; MG/1
1 TABLET, FILM COATED ORAL DAILY
Qty: 90 TABLET | Refills: 1 | Status: SHIPPED | OUTPATIENT
Start: 2024-01-26

## 2024-01-26 ASSESSMENT — ENCOUNTER SYMPTOMS
BLOOD IN STOOL: 0
FACIAL SWELLING: 0
ABDOMINAL PAIN: 0
EYE PAIN: 0
RHINORRHEA: 0
BACK PAIN: 0
EYE REDNESS: 0
COUGH: 0
SINUS PRESSURE: 0
CHEST TIGHTNESS: 0
EYE ITCHING: 0
ANAL BLEEDING: 0
ABDOMINAL DISTENTION: 0
EYE DISCHARGE: 0
BLURRED VISION: 0
APNEA: 0
SINUS PAIN: 0

## 2024-01-26 NOTE — TELEPHONE ENCOUNTER
Pt states pharmacy is telling him he needs a Rx for the Urea cream. Rx pended.     Requested Prescriptions     Pending Prescriptions Disp Refills    Urea (CARMOL) 40 % cream 85 g 0     Sig: Apply topically daily

## 2024-01-27 NOTE — PROGRESS NOTES
[facility]  _______________________________________  Talha Mooney MD                 [address] [place] [address]        D[place] [address]                                                                                    Phone: [phone]                                                                                    Fax: [phone]    Constantine Jacob is a 68 y.o. male who is seen for evaluation of   Chief Complaint   Patient presents with   • Foot Pain     Pt reports heel cracks on both feet. Pt is concerned since he is a diabetic. Pt denies drainage. Also reports increase in size of the heel crack.        HPI:   Foot Pain   Episode onset: bilat heel pain, left worse than right, sore to touch and redness and pain. Pertinent negatives include no fever.   Diabetes  He presents for his follow-up diabetic visit. He has type 2 diabetes mellitus. Onset time: stable on meds, no side effect noted. Pertinent negatives for hypoglycemia include no confusion, dizziness, headaches, nervousness/anxiousness or pallor. Pertinent negatives for diabetes include no blurred vision, no chest pain and no fatigue.   Hypertension  This is a chronic problem. Episode onset: satble on meds, bettie reiflls. Pertinent negatives include no anxiety, blurred vision, chest pain or headaches. There are no associated agents to hypertension.    Chief Complaint   Patient presents with   • Foot Pain     Pt reports heel cracks on both feet. Pt is concerned since he is a diabetic. Pt denies drainage. Also reports increase in size of the heel crack.          Review of Systems:    Review of Systems   Constitutional:  Negative for activity change, appetite change, chills, diaphoresis, fatigue and fever.   HENT:  Negative for congestion, ear discharge, ear pain, facial swelling, hearing loss, mouth sores, rhinorrhea, sinus pressure and sinus pain.    Eyes:  Negative for blurred vision,

## 2024-05-08 ENCOUNTER — OFFICE VISIT (OUTPATIENT)
Dept: FAMILY MEDICINE CLINIC | Facility: CLINIC | Age: 69
End: 2024-05-08
Payer: MEDICARE

## 2024-05-08 VITALS
DIASTOLIC BLOOD PRESSURE: 80 MMHG | WEIGHT: 303 LBS | BODY MASS INDEX: 44.88 KG/M2 | SYSTOLIC BLOOD PRESSURE: 136 MMHG | HEIGHT: 69 IN

## 2024-05-08 DIAGNOSIS — J44.0 CHRONIC OBSTRUCTIVE PULMONARY DISEASE WITH ACUTE LOWER RESPIRATORY INFECTION (HCC): ICD-10-CM

## 2024-05-08 DIAGNOSIS — E11.9 TYPE 2 DIABETES MELLITUS WITHOUT COMPLICATION, WITH LONG-TERM CURRENT USE OF INSULIN (HCC): ICD-10-CM

## 2024-05-08 DIAGNOSIS — M54.50 ACUTE LEFT-SIDED LOW BACK PAIN WITHOUT SCIATICA: Primary | ICD-10-CM

## 2024-05-08 DIAGNOSIS — T14.8XXA SKIN ABRASION: ICD-10-CM

## 2024-05-08 DIAGNOSIS — I10 ESSENTIAL HYPERTENSION, BENIGN: ICD-10-CM

## 2024-05-08 DIAGNOSIS — Z79.4 TYPE 2 DIABETES MELLITUS WITHOUT COMPLICATION, WITH LONG-TERM CURRENT USE OF INSULIN (HCC): ICD-10-CM

## 2024-05-08 DIAGNOSIS — E55.9 VITAMIN D DEFICIENCY: ICD-10-CM

## 2024-05-08 PROCEDURE — 1123F ACP DISCUSS/DSCN MKR DOCD: CPT | Performed by: FAMILY MEDICINE

## 2024-05-08 PROCEDURE — 99214 OFFICE O/P EST MOD 30 MIN: CPT | Performed by: FAMILY MEDICINE

## 2024-05-08 PROCEDURE — 3079F DIAST BP 80-89 MM HG: CPT | Performed by: FAMILY MEDICINE

## 2024-05-08 PROCEDURE — 3046F HEMOGLOBIN A1C LEVEL >9.0%: CPT | Performed by: FAMILY MEDICINE

## 2024-05-08 PROCEDURE — 3075F SYST BP GE 130 - 139MM HG: CPT | Performed by: FAMILY MEDICINE

## 2024-05-08 RX ORDER — TIZANIDINE 4 MG/1
4 TABLET ORAL 3 TIMES DAILY PRN
Qty: 30 TABLET | Refills: 0 | Status: SHIPPED | OUTPATIENT
Start: 2024-05-08

## 2024-05-08 ASSESSMENT — ENCOUNTER SYMPTOMS
CHEST TIGHTNESS: 0
COUGH: 0
EYE ITCHING: 0
EYE REDNESS: 0
APNEA: 0
ABDOMINAL DISTENTION: 0
BACK PAIN: 1
ANAL BLEEDING: 0
EYE PAIN: 0
SINUS PAIN: 0
BLOOD IN STOOL: 0
FACIAL SWELLING: 0
RHINORRHEA: 0
ABDOMINAL PAIN: 0
SINUS PRESSURE: 0
EYE DISCHARGE: 0

## 2024-05-08 NOTE — PROGRESS NOTES
[facility]  _______________________________________  Talha Mooney MD                 [address] [place] [address]        D[place] [address]                                                                                    Phone: [phone]                                                                                    Fax: [phone]    Constantine Jacob is a 69 y.o. male who is seen for evaluation of   Chief Complaint   Patient presents with   • Back Pain   • Foot Injury     Left foot; dropped something on his foot       HPI:   Back Pain  This is a new problem. Episode frequency: left lower lumbar pain , reduced ROM from the pain. Pertinent negatives include no abdominal pain, chest pain, dysuria, fever or headaches.   Foot Injury   Incident location: see below, scratch on left foot from dropping a metal straw on foot, no pain noted.   Diabetes  He presents for his follow-up diabetic visit. He has type 2 diabetes mellitus. Disease course: denies any issues with sugar control at home. Pertinent negatives for hypoglycemia include no confusion, dizziness, headaches, nervousness/anxiousness or pallor. Pertinent negatives for diabetes include no chest pain and no fatigue.   Hypertension  Episode onset: stable on meds, no breakhtroughnoted. Pertinent negatives include no chest pain or headaches.    Chief Complaint   Patient presents with   • Back Pain   • Foot Injury     Left foot; dropped something on his foot         Review of Systems:    Review of Systems   Constitutional:  Negative for activity change, appetite change, chills, diaphoresis, fatigue and fever.   HENT:  Negative for congestion, ear discharge, ear pain, facial swelling, hearing loss, mouth sores, rhinorrhea, sinus pressure and sinus pain.    Eyes:  Negative for pain, discharge, redness and itching.   Respiratory:  Negative for apnea, cough and chest tightness.    Cardiovascular:

## 2024-05-14 ENCOUNTER — TELEPHONE (OUTPATIENT)
Dept: SLEEP MEDICINE | Age: 69
End: 2024-05-14

## 2024-05-14 NOTE — TELEPHONE ENCOUNTER
Called patient back LVM- wanted to see if he could go one night without Ambien. If we write for 1 pill to hold him over I don't know if it would affect insurance coverage when we write for the 30 day refill.

## 2024-05-14 NOTE — TELEPHONE ENCOUNTER
Patient called to change his appointment because he needs to  a child from school. I changed his appointment from Friday at 4 pm with Dr. Lee to Thursday with Samara Thompson. He states that he has one Ambien for tonight. But will not have one for tomorrow. Please call him at [phone].       Pharmacy is [facility].

## 2024-05-15 ENCOUNTER — TELEPHONE (OUTPATIENT)
Dept: PULMONOLOGY | Age: 69
End: 2024-05-15

## 2024-05-15 NOTE — TELEPHONE ENCOUNTER
Patient call refill line he is needing zolpidem (AMBIEN) 10 MG tablet. Patient is out and his appointment is Thursday. He needs enough until his appointment [facility], [place] - [address] [facility] -  [phone] - f [phone]. Gissell eli

## 2024-05-15 NOTE — PROGRESS NOTES
months    Pressure:   cmH20  EPR:      Starting Ramp Pressure:   cm H20  Ramp Time: min      Patient had a diagnostic Apnea Hypopnea Index (AHI) of :    *SUPPLIES* Replace all as needed, or per coverage guidelines     Masks Type:  ( x   ) -Full Face Mask (1 per 3 mon)  (  x  ) -Full Mask (1 per month) Interface/Cushion      (  ) -Nasal Mask (1 per 3 mon)  (  ) - Nasal Mask (1 per month) Interface/Cushion  (     ) -Pillow (2 per mon)  (     ) -Sjqlljgvb (1 per 6 mon)            Other Supplies:    (   X  )-Jootyjej (1 per 6 mon)  (   X  )-Lfkcqt Tubing (1 per 3 mon)  (   X  )- Disposable Filter (2 per mon)  (   x  )-Zlyycl Humidifier (1 per year)     ( x    )-Fltgmqcot (sometimes used with Full Face Mask) (1 per 6 mos)  (    )-Tubing-without heat (1 per 3 mos)  (     )-Non-Disposable Filter (1 per 6 mos)  (  x   )-Water Chamber (1 per 6 mos)  (     )-Humidifier non-heated (1 per 5 yrs)      Signed Date: 5/16/2024  Electronically Signed By: JOELLE Collins  Electronically Dated:  5/16/2024     Orders Placed This Encounter   Medications    zolpidem (AMBIEN) 10 MG tablet     Sig: Take 1 tablet by mouth nightly for 180 days. Max Daily Amount: 10 mg     Dispense:  30 tablet     Refill:  5         Collaborating Physician: Dr. Lee     Over 50% of today's office visit was spent in face to face time reviewing test results, prognosis, importance of compliance, education about disease process, benefits of medications, instructions for management of acute flare-ups, and follow up plans.  Total face to face time spent with patient was 30 minutes.        JOELLE Collins  Electronically signed

## 2024-05-16 ENCOUNTER — OFFICE VISIT (OUTPATIENT)
Dept: SLEEP MEDICINE | Age: 69
End: 2024-05-16
Payer: MEDICARE

## 2024-05-16 VITALS
TEMPERATURE: 97.4 F | BODY MASS INDEX: 46.65 KG/M2 | DIASTOLIC BLOOD PRESSURE: 78 MMHG | WEIGHT: 315 LBS | HEIGHT: 69 IN | SYSTOLIC BLOOD PRESSURE: 135 MMHG | HEART RATE: 85 BPM | RESPIRATION RATE: 19 BRPM | OXYGEN SATURATION: 91 %

## 2024-05-16 DIAGNOSIS — G47.00 INSOMNIA, UNSPECIFIED TYPE: ICD-10-CM

## 2024-05-16 DIAGNOSIS — G47.33 OSA (OBSTRUCTIVE SLEEP APNEA): Primary | ICD-10-CM

## 2024-05-16 DIAGNOSIS — G47.33 OSA TREATED WITH BIPAP: ICD-10-CM

## 2024-05-16 PROCEDURE — 99214 OFFICE O/P EST MOD 30 MIN: CPT | Performed by: PHYSICIAN ASSISTANT

## 2024-05-16 PROCEDURE — 1123F ACP DISCUSS/DSCN MKR DOCD: CPT | Performed by: PHYSICIAN ASSISTANT

## 2024-05-16 PROCEDURE — 3075F SYST BP GE 130 - 139MM HG: CPT | Performed by: PHYSICIAN ASSISTANT

## 2024-05-16 PROCEDURE — 3078F DIAST BP <80 MM HG: CPT | Performed by: PHYSICIAN ASSISTANT

## 2024-05-16 RX ORDER — ZOLPIDEM TARTRATE 10 MG/1
10 TABLET ORAL
Qty: 30 TABLET | Refills: 5 | Status: SHIPPED | OUTPATIENT
Start: 2024-05-16 | End: 2024-11-12

## 2024-05-16 ASSESSMENT — SLEEP AND FATIGUE QUESTIONNAIRES
HOW LIKELY ARE YOU TO NOD OFF OR FALL ASLEEP WHILE SITTING QUIETLY AFTER LUNCH WITHOUT ALCOHOL: HIGH CHANCE OF DOZING
HOW LIKELY ARE YOU TO NOD OFF OR FALL ASLEEP WHILE SITTING AND READING: HIGH CHANCE OF DOZING
HOW LIKELY ARE YOU TO NOD OFF OR FALL ASLEEP WHILE SITTING INACTIVE IN A PUBLIC PLACE: WOULD NEVER DOZE
HOW LIKELY ARE YOU TO NOD OFF OR FALL ASLEEP WHILE WATCHING TV: MODERATE CHANCE OF DOZING
HOW LIKELY ARE YOU TO NOD OFF OR FALL ASLEEP IN A CAR, WHILE STOPPED FOR A FEW MINUTES IN TRAFFIC: WOULD NEVER DOZE
HOW LIKELY ARE YOU TO NOD OFF OR FALL ASLEEP WHILE SITTING AND TALKING TO SOMEONE: SLIGHT CHANCE OF DOZING
ESS TOTAL SCORE: 12
HOW LIKELY ARE YOU TO NOD OFF OR FALL ASLEEP WHEN YOU ARE A PASSENGER IN A CAR FOR AN HOUR WITHOUT A BREAK: SLIGHT CHANCE OF DOZING
HOW LIKELY ARE YOU TO NOD OFF OR FALL ASLEEP WHILE LYING DOWN TO REST IN THE AFTERNOON WHEN CIRCUMSTANCES PERMIT: MODERATE CHANCE OF DOZING

## 2024-05-17 ENCOUNTER — TELEPHONE (OUTPATIENT)
Dept: PHARMACY | Facility: CLINIC | Age: 69
End: 2024-05-17

## 2024-05-17 NOTE — TELEPHONE ENCOUNTER
Talha Coppola MD  ,    Constantine [place] Jacob has an appointment with you Monday 5/20/24 and has been identified with a care gap for Statin Use in Person With Diabetes (SUPD).    Per patient chart review:  is prescribed atorvastatin 20 mg, however this medication has not been filled in 2024 per patient's [employer] insurance.    Please send refills for atorvastatin to patient's pharmacy    Thank you,  Rosa Paige, PharmD, [facility]  [facility] Clinical Pharmacist  Department: [phone]    =============================================================================== and [facility] CLINICAL PHARMACY: STATIN THERAPY REVIEW  Identified statin use in persons with diabetes care gap per [employer]. Records dated: 4/1/24.       STATIN GAP IDENTIFIED- Assessment      Per chart review: is prescribed atorvastatin 20 mg      Dispensed Days Supply Quantity Provider Pharmacy    ATORVASTATIN CALCIUM  20 MG TABS 11/01/2023 90 90 tablet Mateo Cuevas MD [facility] [address] [place] [address]   ATORVASTATIN CALCIUM  20 MG TABS 08/08/2023 90 90 tablet Mateo Cuevas MD [facility] [address] [place] [address]   ATORVASTATIN CALCIUM  20 MG TABS 05/15/2023 90 90 tablet Mateo Cuevas MD [facility] - ...         Allergies   Allergen Reactions    Adhesive Tape Hives     bandaids       Lipid evaluation and Guideline Assessment      Lab Results   Component Value Date    CHOL 137 01/18/2024    TRIG 243 (H) 01/18/2024    HDL 38 (L) 01/18/2024     ALT   Date Value Ref Range Status   01/18/2024 52 12 - 65 U/L Final     AST   Date Value Ref Range Status   01/18/2024 39 (H) 15 - 37 U/L Final     The 10-year ASCVD risk score (Alex BEYER, et al., 2019) is: 30.3%    Values used to calculate the score:      Age: 69 years      Sex: Male      Is Non- : No      Diabetic: Yes      Tobacco smoker: No      Systolic Blood Pressure: 135 mmHg      Is BP treated: Yes      HDL Cholesterol: 38 MG/DL

## 2024-05-20 ENCOUNTER — OFFICE VISIT (OUTPATIENT)
Dept: FAMILY MEDICINE CLINIC | Facility: CLINIC | Age: 69
End: 2024-05-20
Payer: MEDICARE

## 2024-05-20 VITALS — WEIGHT: 306 LBS | HEIGHT: 69 IN | BODY MASS INDEX: 45.32 KG/M2

## 2024-05-20 DIAGNOSIS — E78.00 HIGH CHOLESTEROL: ICD-10-CM

## 2024-05-20 DIAGNOSIS — G47.33 OSA TREATED WITH BIPAP: ICD-10-CM

## 2024-05-20 DIAGNOSIS — J44.0 CHRONIC OBSTRUCTIVE PULMONARY DISEASE WITH ACUTE LOWER RESPIRATORY INFECTION (HCC): ICD-10-CM

## 2024-05-20 DIAGNOSIS — I10 ESSENTIAL HYPERTENSION, BENIGN: Primary | ICD-10-CM

## 2024-05-20 DIAGNOSIS — E55.9 VITAMIN D DEFICIENCY: ICD-10-CM

## 2024-05-20 DIAGNOSIS — Z79.4 TYPE 2 DIABETES MELLITUS WITHOUT COMPLICATION, WITH LONG-TERM CURRENT USE OF INSULIN (HCC): ICD-10-CM

## 2024-05-20 DIAGNOSIS — E11.9 TYPE 2 DIABETES MELLITUS WITHOUT COMPLICATION, WITH LONG-TERM CURRENT USE OF INSULIN (HCC): ICD-10-CM

## 2024-05-20 DIAGNOSIS — K21.9 GASTROESOPHAGEAL REFLUX DISEASE, UNSPECIFIED WHETHER ESOPHAGITIS PRESENT: ICD-10-CM

## 2024-05-20 LAB
ALBUMIN SERPL-MCNC: 3.9 G/DL (ref 3.2–4.6)
ALBUMIN/GLOB SERPL: 1.5 (ref 1–1.9)
ALP SERPL-CCNC: 91 U/L (ref 40–129)
ALT SERPL-CCNC: 41 U/L (ref 12–65)
ANION GAP SERPL CALC-SCNC: 12 MMOL/L (ref 9–18)
AST SERPL-CCNC: 45 U/L (ref 15–37)
BASOPHILS # BLD: 0 K/UL (ref 0–0.2)
BASOPHILS NFR BLD: 1 % (ref 0–2)
BILIRUB SERPL-MCNC: 0.4 MG/DL (ref 0–1.2)
BUN SERPL-MCNC: 14 MG/DL (ref 8–23)
CALCIUM SERPL-MCNC: 9.6 MG/DL (ref 8.8–10.2)
CHLORIDE SERPL-SCNC: 98 MMOL/L (ref 98–107)
CHOLEST SERPL-MCNC: 116 MG/DL (ref 0–200)
CO2 SERPL-SCNC: 27 MMOL/L (ref 20–28)
CREAT SERPL-MCNC: 0.83 MG/DL (ref 0.8–1.3)
DIFFERENTIAL METHOD BLD: ABNORMAL
EOSINOPHIL # BLD: 0.1 K/UL (ref 0–0.8)
EOSINOPHIL NFR BLD: 2 % (ref 0.5–7.8)
ERYTHROCYTE [DISTWIDTH] IN BLOOD BY AUTOMATED COUNT: 12.6 % (ref 11.9–14.6)
EST. AVERAGE GLUCOSE BLD GHB EST-MCNC: 243 MG/DL
GLOBULIN SER CALC-MCNC: 2.7 G/DL (ref 2.3–3.5)
GLUCOSE SERPL-MCNC: 267 MG/DL (ref 70–99)
HBA1C MFR BLD: 10.1 % (ref 0–5.6)
HCT VFR BLD AUTO: 40.5 % (ref 41.1–50.3)
HDLC SERPL-MCNC: 32 MG/DL (ref 40–60)
HDLC SERPL: 3.7 (ref 0–5)
HGB BLD-MCNC: 13 G/DL (ref 13.6–17.2)
IMM GRANULOCYTES # BLD AUTO: 0.1 K/UL (ref 0–0.5)
IMM GRANULOCYTES NFR BLD AUTO: 1 % (ref 0–5)
LDLC SERPL CALC-MCNC: 31 MG/DL (ref 0–100)
LYMPHOCYTES # BLD: 1.7 K/UL (ref 0.5–4.6)
LYMPHOCYTES NFR BLD: 21 % (ref 13–44)
MCH RBC QN AUTO: 28.8 PG (ref 26.1–32.9)
MCHC RBC AUTO-ENTMCNC: 32.1 G/DL (ref 31.4–35)
MCV RBC AUTO: 89.6 FL (ref 82–102)
MONOCYTES # BLD: 0.5 K/UL (ref 0.1–1.3)
MONOCYTES NFR BLD: 6 % (ref 4–12)
NEUTS SEG # BLD: 5.8 K/UL (ref 1.7–8.2)
NEUTS SEG NFR BLD: 69 % (ref 43–78)
NRBC # BLD: 0 K/UL (ref 0–0.2)
PLATELET # BLD AUTO: 190 K/UL (ref 150–450)
PMV BLD AUTO: 11.5 FL (ref 9.4–12.3)
POTASSIUM SERPL-SCNC: 4.3 MMOL/L (ref 3.5–5.1)
PROT SERPL-MCNC: 6.6 G/DL (ref 6.3–8.2)
RBC # BLD AUTO: 4.52 M/UL (ref 4.23–5.6)
SODIUM SERPL-SCNC: 137 MMOL/L (ref 136–145)
TRIGL SERPL-MCNC: 270 MG/DL (ref 0–150)
VLDLC SERPL CALC-MCNC: 54 MG/DL (ref 6–23)
WBC # BLD AUTO: 8.2 K/UL (ref 4.3–11.1)

## 2024-05-20 PROCEDURE — 3046F HEMOGLOBIN A1C LEVEL >9.0%: CPT | Performed by: FAMILY MEDICINE

## 2024-05-20 PROCEDURE — 1123F ACP DISCUSS/DSCN MKR DOCD: CPT | Performed by: FAMILY MEDICINE

## 2024-05-20 PROCEDURE — 99214 OFFICE O/P EST MOD 30 MIN: CPT | Performed by: FAMILY MEDICINE

## 2024-05-20 SDOH — ECONOMIC STABILITY: TRANSPORTATION INSECURITY
IN THE PAST 12 MONTHS, HAS LACK OF TRANSPORTATION KEPT YOU FROM MEETINGS, WORK, OR FROM GETTING THINGS NEEDED FOR DAILY LIVING?: NO

## 2024-05-20 SDOH — ECONOMIC STABILITY: FOOD INSECURITY: WITHIN THE PAST 12 MONTHS, THE FOOD YOU BOUGHT JUST DIDN'T LAST AND YOU DIDN'T HAVE MONEY TO GET MORE.: NEVER TRUE

## 2024-05-20 SDOH — ECONOMIC STABILITY: FOOD INSECURITY: WITHIN THE PAST 12 MONTHS, YOU WORRIED THAT YOUR FOOD WOULD RUN OUT BEFORE YOU GOT MONEY TO BUY MORE.: NEVER TRUE

## 2024-05-20 SDOH — ECONOMIC STABILITY: INCOME INSECURITY: HOW HARD IS IT FOR YOU TO PAY FOR THE VERY BASICS LIKE FOOD, HOUSING, MEDICAL CARE, AND HEATING?: NOT VERY HARD

## 2024-05-20 ASSESSMENT — ENCOUNTER SYMPTOMS
ABDOMINAL PAIN: 0
RHINORRHEA: 0
EYE PAIN: 0
COUGH: 0
FACIAL SWELLING: 0
EYE ITCHING: 0
BACK PAIN: 0
BLOOD IN STOOL: 0
EYE REDNESS: 0
SINUS PRESSURE: 0
ORTHOPNEA: 0
CHEST TIGHTNESS: 0
APNEA: 0
EYE DISCHARGE: 0
ANAL BLEEDING: 0
ABDOMINAL DISTENTION: 0
BLURRED VISION: 0
SINUS PAIN: 0

## 2024-05-20 ASSESSMENT — PATIENT HEALTH QUESTIONNAIRE - PHQ9
SUM OF ALL RESPONSES TO PHQ QUESTIONS 1-9: 0
SUM OF ALL RESPONSES TO PHQ9 QUESTIONS 1 & 2: 0
SUM OF ALL RESPONSES TO PHQ QUESTIONS 1-9: 0
1. LITTLE INTEREST OR PLEASURE IN DOING THINGS: NOT AT ALL
SUM OF ALL RESPONSES TO PHQ QUESTIONS 1-9: 0
SUM OF ALL RESPONSES TO PHQ QUESTIONS 1-9: 0
2. FEELING DOWN, DEPRESSED OR HOPELESS: NOT AT ALL

## 2024-05-20 NOTE — PROGRESS NOTES
present  6. MISTI treated with BiPAP  7. High cholesterol  -     Lipid Panel; Future     Meds sent  Labs sent  Encourage diet and exercise   Encourage weight loss,  Encourage home sugar monitoring  Call if problems  Recheck 4 months  Talha Coppola MD

## 2024-05-21 NOTE — TELEPHONE ENCOUNTER
No response from provider, refills for atorvastatin not sent.     Rosa Paige, PharmD, [facility]  [facility]  [facility] Clinical Pharmacist  Department: [phone]

## 2024-05-23 NOTE — TELEPHONE ENCOUNTER
Spoke with patient to make sure he was aware his medication sent in. He aware and no further concerns.

## 2024-05-31 ENCOUNTER — OFFICE VISIT (OUTPATIENT)
Dept: PULMONOLOGY | Age: 69
End: 2024-05-31
Payer: MEDICARE

## 2024-05-31 VITALS
BODY MASS INDEX: 46.51 KG/M2 | OXYGEN SATURATION: 94 % | HEIGHT: 69 IN | WEIGHT: 314 LBS | SYSTOLIC BLOOD PRESSURE: 128 MMHG | HEART RATE: 96 BPM | RESPIRATION RATE: 19 BRPM | DIASTOLIC BLOOD PRESSURE: 74 MMHG | TEMPERATURE: 97.2 F

## 2024-05-31 DIAGNOSIS — J44.0 CHRONIC OBSTRUCTIVE PULMONARY DISEASE WITH ACUTE LOWER RESPIRATORY INFECTION (HCC): ICD-10-CM

## 2024-05-31 DIAGNOSIS — J98.4 RESTRICTIVE LUNG DISEASE: Primary | ICD-10-CM

## 2024-05-31 DIAGNOSIS — J96.11 CHRONIC RESPIRATORY FAILURE WITH HYPOXIA AND HYPERCAPNIA (HCC): ICD-10-CM

## 2024-05-31 DIAGNOSIS — G47.33 OSA TREATED WITH BIPAP: ICD-10-CM

## 2024-05-31 DIAGNOSIS — J96.12 CHRONIC RESPIRATORY FAILURE WITH HYPOXIA AND HYPERCAPNIA (HCC): ICD-10-CM

## 2024-05-31 PROCEDURE — 1123F ACP DISCUSS/DSCN MKR DOCD: CPT | Performed by: INTERNAL MEDICINE

## 2024-05-31 PROCEDURE — 99215 OFFICE O/P EST HI 40 MIN: CPT | Performed by: INTERNAL MEDICINE

## 2024-05-31 PROCEDURE — 3074F SYST BP LT 130 MM HG: CPT | Performed by: INTERNAL MEDICINE

## 2024-05-31 PROCEDURE — 3078F DIAST BP <80 MM HG: CPT | Performed by: INTERNAL MEDICINE

## 2024-05-31 NOTE — PROGRESS NOTES
couple weeks.  He is using his BiPAP and oxygen at night consistently.  He is also had some headache sometimes more in the morning after waking up.  Normally has not had history of headaches.  He denies any fevers, chills, night sweats weight loss    REVIEW OF SYSTEMS: 10 point review of systems is negative except as reported in HPI.    PHYSICAL EXAM: Body mass index is 46.37 kg/m².  Vitals:    05/31/24 0939   BP: 128/74   Pulse: 96   Resp: 19   Temp: 97.2 °F (36.2 °C)   TempSrc: Temporal   SpO2: 94%   Weight: (!) 142.4 kg (314 lb)   Height: 1.753 m (5' 9\")           General:   Alert, cooperative, no distress, appears stated age.        Eyes:   Conjunctivae/corneas clear. PERRL        Mouth/Throat:  Lips, mucosa, and tongue normal. Teeth and gums normal.        Lungs:     Breath sounds decreased.     Heart:   Regular rate and rhythm, S1, S2 normal, no murmur, click, rub or gallop.     Abdomen:    Soft, non-tender.     Extremities:  Extremities normal, atraumatic, no cyanosis: 2+ LE edema.     Skin:  Skin color normal. No rashes or lesions     Neurologic:  A&Ox3     DIAGNOSTIC TESTS:                                                                                    LABS:   Lab Results   Component Value Date/Time    WBC 8.2 05/20/2024 02:32 PM    HGB 13.0 05/20/2024 02:32 PM    HCT 40.5 05/20/2024 02:32 PM     05/20/2024 02:32 PM    TSH 1.060 03/12/2020 12:22 PM    NTPROBNP 414 10/24/2022 12:54 PM     Imaging: I performed an independent interpretation of the patient's images.  CXR:   XR CHEST STANDARD TWO VW 01/24/2023    Narrative  XR CHEST (2 VW) 1/24/2023 1:57 PM    HISTORY: Increasing shortness of breath and orthopnea.  Bilateral lower  extremity edema.    COMPARISON: Chest x-ray 10/25/2022.    AP and lateral views of the chest were obtained.    Impression  Improving pulmonary vascular congestion compared to prior exam.  No  overt pulmonary edema.  The heart size is normal in size. No pneumothorax.

## 2024-06-24 ENCOUNTER — TELEPHONE (OUTPATIENT)
Dept: FAMILY MEDICINE CLINIC | Facility: CLINIC | Age: 69
End: 2024-06-24

## 2024-06-24 ENCOUNTER — NURSE ONLY (OUTPATIENT)
Dept: PULMONOLOGY | Age: 69
End: 2024-06-24
Payer: MEDICARE

## 2024-06-24 DIAGNOSIS — J98.4 RESTRICTIVE LUNG DISEASE: Primary | ICD-10-CM

## 2024-06-24 DIAGNOSIS — E78.00 HIGH CHOLESTEROL: Primary | ICD-10-CM

## 2024-06-24 LAB
FEF25-27, POC: 0.69 L/S
FET, POC: NORMAL
FEV 1 , POC: 1.32 L
FEV1/FVC, POC: NORMAL
FVC, POC: NORMAL
LUNG AGE, POC: NORMAL
PEF, POC: 6.08 L/S

## 2024-06-24 PROCEDURE — 94010 BREATHING CAPACITY TEST: CPT | Performed by: INTERNAL MEDICINE

## 2024-06-24 PROCEDURE — 94729 DIFFUSING CAPACITY: CPT | Performed by: INTERNAL MEDICINE

## 2024-06-24 PROCEDURE — 94726 PLETHYSMOGRAPHY LUNG VOLUMES: CPT | Performed by: INTERNAL MEDICINE

## 2024-06-24 RX ORDER — ATORVASTATIN CALCIUM 20 MG/1
20 TABLET, FILM COATED ORAL DAILY
Qty: 90 TABLET | Refills: 1 | Status: SHIPPED | OUTPATIENT
Start: 2024-06-24 | End: 2024-12-21

## 2024-06-24 NOTE — TELEPHONE ENCOUNTER
Patient called requesting refill(s) on     Requested Prescriptions     Pending Prescriptions Disp Refills    atorvastatin (LIPITOR) 20 MG tablet 90 tablet 1     Sig: Take 1 tablet by mouth daily       Last appointment- 5/20/24  Next appointment- 9/23/24

## 2024-06-25 ENCOUNTER — TELEPHONE (OUTPATIENT)
Dept: SLEEP MEDICINE | Age: 69
End: 2024-06-25

## 2024-06-25 NOTE — TELEPHONE ENCOUNTER
Please contact pt and let him know that he should increase his O2 to 5L bled into his CPAP. He had less than 2 mins of sats <89% but his O2 did drop down to 87%.     Thank you.

## 2024-06-25 NOTE — TELEPHONE ENCOUNTER
Spoke with pt and instructed him to increase his O2 to 5LPM bled into his cpap per JOELLE Horn. Pt voiced understanding

## 2024-06-26 ENCOUNTER — TELEPHONE (OUTPATIENT)
Dept: PULMONOLOGY | Age: 69
End: 2024-06-26

## 2024-06-26 NOTE — TELEPHONE ENCOUNTER
Left patient message via voicemail to please give me a call as soon as they are available. // Mily Cuenca M.A.

## 2024-06-26 NOTE — TELEPHONE ENCOUNTER
----- Message from Judy Burton MD sent at 6/26/2024  9:24 AM EDT -----  He does have obstruction and air trapping on PFTs and I would recommend going back on high dose inhalers for COPD/asthma component of shortness of breath. Would try Breztri BID  ----- Message -----  From: Flroinda Llanos RCP  Sent: 6/25/2024   9:42 AM EDT  To: Judy Burton MD

## 2024-06-27 NOTE — TELEPHONE ENCOUNTER
Spoke with the patient in regards to their CPFT results, explained per Dr. Burton that the breathing tests show obstruction and air trapping on PFTs and he recommends going back on a high dose inhaler for COPD/Asthma component of shortness of breath called Breztri.   Patient verbalized understanding of results and is agreeable with trying the Breztri.  Prescription has been pended to the patient's preferred pharmacy.  No further questions or concerns were asked at this time.    Dr. Burton, please sign off on the Breztri when you are available too.  Thank you so much!  // Mily Cuenca M.A.

## 2024-07-01 RX ORDER — BUDESONIDE, GLYCOPYRROLATE, AND FORMOTEROL FUMARATE 160; 9; 4.8 UG/1; UG/1; UG/1
2 AEROSOL, METERED RESPIRATORY (INHALATION) 2 TIMES DAILY
Qty: 1 EACH | Refills: 11 | Status: SHIPPED | OUTPATIENT
Start: 2024-07-01

## 2024-07-12 DIAGNOSIS — N40.0 BENIGN PROSTATIC HYPERPLASIA, UNSPECIFIED WHETHER LOWER URINARY TRACT SYMPTOMS PRESENT: Primary | ICD-10-CM

## 2024-07-15 RX ORDER — TAMSULOSIN HYDROCHLORIDE 0.4 MG/1
0.4 CAPSULE ORAL DAILY
Qty: 90 CAPSULE | Refills: 1 | Status: SHIPPED | OUTPATIENT
Start: 2024-07-15 | End: 2025-01-11

## 2024-07-15 NOTE — TELEPHONE ENCOUNTER
Patient requesting refill(s) on     Requested Prescriptions     Pending Prescriptions Disp Refills    tamsulosin (FLOMAX) 0.4 MG capsule 90 capsule 1     Sig: Take 1 capsule by mouth daily       Last appointment- 5/20/24  Next appointment- 9/23/24

## 2024-07-23 ENCOUNTER — OFFICE VISIT (OUTPATIENT)
Dept: NEUROLOGY | Age: 69
End: 2024-07-23
Payer: MEDICARE

## 2024-07-23 VITALS — HEART RATE: 100 BPM | DIASTOLIC BLOOD PRESSURE: 63 MMHG | SYSTOLIC BLOOD PRESSURE: 101 MMHG

## 2024-07-23 DIAGNOSIS — G95.9 CERVICAL MYELOPATHY (HCC): Primary | ICD-10-CM

## 2024-07-23 DIAGNOSIS — G70.00 MYASTHENIA GRAVIS (HCC): ICD-10-CM

## 2024-07-23 PROCEDURE — 3074F SYST BP LT 130 MM HG: CPT | Performed by: PSYCHIATRY & NEUROLOGY

## 2024-07-23 PROCEDURE — 3078F DIAST BP <80 MM HG: CPT | Performed by: PSYCHIATRY & NEUROLOGY

## 2024-07-23 PROCEDURE — 1123F ACP DISCUSS/DSCN MKR DOCD: CPT | Performed by: PSYCHIATRY & NEUROLOGY

## 2024-07-23 PROCEDURE — 99204 OFFICE O/P NEW MOD 45 MIN: CPT | Performed by: PSYCHIATRY & NEUROLOGY

## 2024-07-23 ASSESSMENT — ENCOUNTER SYMPTOMS
EYES NEGATIVE: 1
RESPIRATORY NEGATIVE: 1
ALLERGIC/IMMUNOLOGIC NEGATIVE: 1

## 2024-07-23 NOTE — PROGRESS NOTES
triceps: 1+  Left triceps: 1+  Right patellar: 1+  Left patellar: 1+  Right achilles: 0  Left achilles: 0          Assessment   Assessment / Plan:    Diagnoses and all orders for this visit:    Cervical myelopathy (HCC) I have a very strong suspicion that this gentleman has critical spinal cord stenosis since the MRI of the cervical spine in 2021 showed severe canal narrowing without associated cord impingement at C3-4.  This does affect the diaphragm.  He was told at that time he probably needed surgery but they would not do it because of his weight.  I suspect this is only gotten much worse.  We are to move forward with an MRI of the patient's cervical spine.    Myasthenia gravis (HCC) ready move forward lab studies and try to get rep stem but I have a very low suspicion of myasthenia gravis  -     Acetylcholine Receptor Panel; Future  -     Muscle-Specific Kinase Abs; Future  -     Anti-Hu, Ri, Yo Antibody Profile, Serum; Future  -     Electrophoresis Protein, Serum; Future  -     CK; Future        The Diagnosis and differential diagnostic considerations, and Rx Tx were reviewed with the patient at length.             Orders Placed This Encounter   Procedures    Acetylcholine Receptor Panel     Standing Status:   Future     Standing Expiration Date:   7/23/2025    Muscle-Specific Kinase Abs     Standing Status:   Future     Standing Expiration Date:   7/23/2025    Anti-Hu, Ri, Yo Antibody Profile, Serum     Standing Status:   Future     Standing Expiration Date:   7/23/2025    Electrophoresis Protein, Serum     Standing Status:   Future     Standing Expiration Date:   7/23/2025    CK     Standing Status:   Future     Standing Expiration Date:   7/23/2025          I have spent greater than 50% of visit discussing and counseling of patient  for treatment and diagnostic plan review. Total time45     .      Notes: Patient is to continue all medications as directed by prescribing physicians. Continuations on today's

## 2024-07-24 LAB — CK SERPL-CCNC: 219 U/L (ref 21–215)

## 2024-07-26 LAB
ALBUMIN SERPL ELPH-MCNC: 3.5 G/DL (ref 2.9–4.4)
ALBUMIN/GLOB SERPL: 1.3 (ref 0.7–1.7)
ALPHA1 GLOB SERPL ELPH-MCNC: 0.2 G/DL (ref 0–0.4)
ALPHA2 GLOB SERPL ELPH-MCNC: 0.8 G/DL (ref 0.4–1)
ANTI-YO (PURKINJE CELL): NEGATIVE TITER
B-GLOBULIN SERPL ELPH-MCNC: 1.1 G/DL (ref 0.7–1.3)
GAMMA GLOB SERPL ELPH-MCNC: 0.6 G/DL (ref 0.4–1.8)
GLOBULIN SER CALC-MCNC: 2.8 G/DL (ref 2.2–3.9)
HU ANTIBODY: NEGATIVE TITER
M PROTEIN SERPL ELPH-MCNC: NORMAL G/DL
PROT SERPL-MCNC: 6.3 G/DL (ref 6–8.5)
RI ANTIBODY: NEGATIVE TITER

## 2024-07-29 LAB
ACHR AB SER-SCNC: <0.03 NMOL/L (ref 0–0.24)
ACHR BLOCK AB SER-ACNC: 17 % (ref 0–25)
ACHR MOD AB/ACHR TOTAL SFR SER: NORMAL %
ACHR MODULATING AB: 6 % (ref 0–45)

## 2024-08-02 LAB — MUSK AB SER IA-ACNC: <1 U/ML

## 2024-08-26 ENCOUNTER — TELEPHONE (OUTPATIENT)
Dept: PULMONOLOGY | Age: 69
End: 2024-08-26

## 2024-08-26 DIAGNOSIS — I10 ESSENTIAL HYPERTENSION, BENIGN: ICD-10-CM

## 2024-08-26 DIAGNOSIS — N40.0 BENIGN PROSTATIC HYPERPLASIA, UNSPECIFIED WHETHER LOWER URINARY TRACT SYMPTOMS PRESENT: ICD-10-CM

## 2024-08-26 NOTE — TELEPHONE ENCOUNTER
PROVIDER FEEDBACK LOOP CALLED 3X     Patient:Constantine Jacob  : 1955  Referring Provider: JERONIMO DAVIS  Referral Type:  Consult for Advice and Opinion     Procedures:  [id number] - AMB REFERRAL TO PULMONARY REHAB  Date Service Ordered 2024        We were unable to reach Constantine Jacob to schedule the test ordered by your office after 3 outreach attempts via either text, email and/or phone call.  Please call/follow up with your patient to explain the significance of the ordered test and direct the patient to call Central Scheduling to schedule the test at their earliest convenience.     Please complete one of the following actions from Quick Actions buttons:     Route to Provider:  Route message to ordering provider to seek next steps in care plan.     Telephone Encounter:  Telephone encounter will open.  Call patient to explain significance of ordered test and direct patient to call Central Scheduling to schedule test then Document details of call.     Open Referral:  Review referral notes or details if needed.     Close Referral:  Referral will open.  Document in Notes section of referral why the referral is being closed.  Examples of referral closure:  Patient had test done outside of of an office in the [email or web address], Patient refuses test, Patient no longer having symptoms, Unable to reach patient.  Only close the referral if you are sure the test will not proceed.     Thank you     Pre-Access Scheduling Team

## 2024-08-27 RX ORDER — TAMSULOSIN HYDROCHLORIDE 0.4 MG/1
0.4 CAPSULE ORAL DAILY
Qty: 90 CAPSULE | Refills: 1 | Status: SHIPPED | OUTPATIENT
Start: 2024-08-27 | End: 2025-02-23

## 2024-08-27 RX ORDER — VALSARTAN AND HYDROCHLOROTHIAZIDE 320; 25 MG/1; MG/1
1 TABLET, FILM COATED ORAL DAILY
Qty: 90 TABLET | Refills: 1 | Status: SHIPPED | OUTPATIENT
Start: 2024-08-27 | End: 2025-02-23

## 2024-08-27 NOTE — TELEPHONE ENCOUNTER
Patient requesting refill(s) on     Requested Prescriptions     Pending Prescriptions Disp Refills    valsartan-hydroCHLOROthiazide (DIOVAN-HCT) 320-25 MG per tablet 90 tablet 1     Sig: Take 1 tablet by mouth daily    tamsulosin (FLOMAX) 0.4 MG capsule 90 capsule 1     Sig: Take 1 capsule by mouth daily       Last appointment- 5/20/24  Next appointment- 9/23/24

## 2024-08-29 ENCOUNTER — TELEPHONE (OUTPATIENT)
Dept: PULMONOLOGY | Age: 69
End: 2024-08-29

## 2024-08-29 ENCOUNTER — TELEMEDICINE (OUTPATIENT)
Dept: FAMILY MEDICINE CLINIC | Facility: CLINIC | Age: 69
End: 2024-08-29

## 2024-08-29 DIAGNOSIS — I10 ESSENTIAL HYPERTENSION, BENIGN: ICD-10-CM

## 2024-08-29 DIAGNOSIS — R50.9 FEVER, UNSPECIFIED FEVER CAUSE: ICD-10-CM

## 2024-08-29 DIAGNOSIS — U07.1 COVID-19: Primary | ICD-10-CM

## 2024-08-29 DIAGNOSIS — R42 DIZZINESS: ICD-10-CM

## 2024-08-29 DIAGNOSIS — R09.81 NASAL CONGESTION: ICD-10-CM

## 2024-08-29 DIAGNOSIS — E11.9 TYPE 2 DIABETES MELLITUS WITHOUT COMPLICATION, WITH LONG-TERM CURRENT USE OF INSULIN (HCC): ICD-10-CM

## 2024-08-29 DIAGNOSIS — J44.0 CHRONIC OBSTRUCTIVE PULMONARY DISEASE WITH ACUTE LOWER RESPIRATORY INFECTION (HCC): ICD-10-CM

## 2024-08-29 DIAGNOSIS — Z79.4 TYPE 2 DIABETES MELLITUS WITHOUT COMPLICATION, WITH LONG-TERM CURRENT USE OF INSULIN (HCC): ICD-10-CM

## 2024-08-29 LAB
INFLUENZA A ANTIGEN, POC: NEGATIVE
INFLUENZA B ANTIGEN, POC: NEGATIVE
LOT EXPIRE DATE: NORMAL
LOT KIT NUMBER: NORMAL
SARS-COV-2 RNA, POC: POSITIVE
VALID INTERNAL CONTROL: YES
VENDOR AND KIT NAME POC: NORMAL

## 2024-08-29 NOTE — TELEPHONE ENCOUNTER
Received faxed request from oral surgeon for pulmonary clearance, scheduled for 9/26/24. Plan is to remove all remaining teeth and place full arch implants, will use moderate IV sedation (propfol), asking if there are any restrictions.    Last seen: 5/31/24  Sleep: 5/16/24    Since last visit had KALA that was normal, sleep increased O2 bled into cpap to 5L (6/25/24), note 6/26/24 to increase inhaler dosing to high based on CPFT results.     Does patient need to be seen in the office to give surgical risk assessment? Has routine f/u appt scheduled for 11/13/24.

## 2024-09-23 ENCOUNTER — OFFICE VISIT (OUTPATIENT)
Dept: FAMILY MEDICINE CLINIC | Facility: CLINIC | Age: 69
End: 2024-09-23
Payer: MEDICARE

## 2024-09-23 VITALS
BODY MASS INDEX: 46.65 KG/M2 | WEIGHT: 315 LBS | HEIGHT: 69 IN | DIASTOLIC BLOOD PRESSURE: 66 MMHG | SYSTOLIC BLOOD PRESSURE: 128 MMHG

## 2024-09-23 DIAGNOSIS — I10 ESSENTIAL HYPERTENSION, BENIGN: Primary | ICD-10-CM

## 2024-09-23 DIAGNOSIS — Z79.4 TYPE 2 DIABETES MELLITUS WITH DIABETIC NEUROPATHY, WITH LONG-TERM CURRENT USE OF INSULIN (HCC): ICD-10-CM

## 2024-09-23 DIAGNOSIS — J44.0 CHRONIC OBSTRUCTIVE PULMONARY DISEASE WITH ACUTE LOWER RESPIRATORY INFECTION (HCC): ICD-10-CM

## 2024-09-23 DIAGNOSIS — E78.00 HIGH CHOLESTEROL: ICD-10-CM

## 2024-09-23 DIAGNOSIS — Z79.4 TYPE 2 DIABETES MELLITUS WITHOUT COMPLICATION, WITH LONG-TERM CURRENT USE OF INSULIN (HCC): ICD-10-CM

## 2024-09-23 DIAGNOSIS — E83.42 HYPOMAGNESEMIA: ICD-10-CM

## 2024-09-23 DIAGNOSIS — K21.9 GASTROESOPHAGEAL REFLUX DISEASE, UNSPECIFIED WHETHER ESOPHAGITIS PRESENT: ICD-10-CM

## 2024-09-23 DIAGNOSIS — E11.40 TYPE 2 DIABETES MELLITUS WITH DIABETIC NEUROPATHY, WITH LONG-TERM CURRENT USE OF INSULIN (HCC): ICD-10-CM

## 2024-09-23 DIAGNOSIS — E11.9 TYPE 2 DIABETES MELLITUS WITHOUT COMPLICATION, WITH LONG-TERM CURRENT USE OF INSULIN (HCC): ICD-10-CM

## 2024-09-23 DIAGNOSIS — N40.0 BENIGN PROSTATIC HYPERPLASIA, UNSPECIFIED WHETHER LOWER URINARY TRACT SYMPTOMS PRESENT: ICD-10-CM

## 2024-09-23 LAB
BASOPHILS # BLD: 0.1 K/UL (ref 0–0.2)
BASOPHILS NFR BLD: 1 % (ref 0–2)
DIFFERENTIAL METHOD BLD: ABNORMAL
EOSINOPHIL # BLD: 0.1 K/UL (ref 0–0.8)
EOSINOPHIL NFR BLD: 2 % (ref 0.5–7.8)
ERYTHROCYTE [DISTWIDTH] IN BLOOD BY AUTOMATED COUNT: 12.8 % (ref 11.9–14.6)
HCT VFR BLD AUTO: 40.7 % (ref 41.1–50.3)
HGB BLD-MCNC: 12.6 G/DL (ref 13.6–17.2)
IMM GRANULOCYTES # BLD AUTO: 0.1 K/UL (ref 0–0.5)
IMM GRANULOCYTES NFR BLD AUTO: 1 % (ref 0–5)
LYMPHOCYTES # BLD: 1.6 K/UL (ref 0.5–4.6)
LYMPHOCYTES NFR BLD: 21 % (ref 13–44)
MCH RBC QN AUTO: 28.1 PG (ref 26.1–32.9)
MCHC RBC AUTO-ENTMCNC: 31 G/DL (ref 31.4–35)
MCV RBC AUTO: 90.8 FL (ref 82–102)
MONOCYTES # BLD: 0.5 K/UL (ref 0.1–1.3)
MONOCYTES NFR BLD: 6 % (ref 4–12)
NEUTS SEG # BLD: 5.1 K/UL (ref 1.7–8.2)
NEUTS SEG NFR BLD: 69 % (ref 43–78)
NRBC # BLD: 0 K/UL (ref 0–0.2)
PLATELET # BLD AUTO: 186 K/UL (ref 150–450)
PMV BLD AUTO: 11.4 FL (ref 9.4–12.3)
RBC # BLD AUTO: 4.48 M/UL (ref 4.23–5.6)
WBC # BLD AUTO: 7.3 K/UL (ref 4.3–11.1)

## 2024-09-23 PROCEDURE — 99214 OFFICE O/P EST MOD 30 MIN: CPT | Performed by: FAMILY MEDICINE

## 2024-09-23 PROCEDURE — 3046F HEMOGLOBIN A1C LEVEL >9.0%: CPT | Performed by: FAMILY MEDICINE

## 2024-09-23 PROCEDURE — 3074F SYST BP LT 130 MM HG: CPT | Performed by: FAMILY MEDICINE

## 2024-09-23 PROCEDURE — 1123F ACP DISCUSS/DSCN MKR DOCD: CPT | Performed by: FAMILY MEDICINE

## 2024-09-23 PROCEDURE — 3078F DIAST BP <80 MM HG: CPT | Performed by: FAMILY MEDICINE

## 2024-09-23 ASSESSMENT — ENCOUNTER SYMPTOMS
EYE PAIN: 0
SINUS PRESSURE: 0
BACK PAIN: 0
RHINORRHEA: 0
SINUS PAIN: 0
ABDOMINAL PAIN: 0
COUGH: 0
EYE REDNESS: 0
CHEST TIGHTNESS: 0
ABDOMINAL DISTENTION: 0
ANAL BLEEDING: 0
FACIAL SWELLING: 0
BLOOD IN STOOL: 0
EYE ITCHING: 0
BLURRED VISION: 0
EYE DISCHARGE: 0
APNEA: 0

## 2024-09-24 LAB
ALBUMIN SERPL-MCNC: 3.8 G/DL (ref 3.2–4.6)
ALBUMIN/GLOB SERPL: 1.2 (ref 1–1.9)
ALP SERPL-CCNC: 83 U/L (ref 40–129)
ALT SERPL-CCNC: 35 U/L (ref 12–65)
ANION GAP SERPL CALC-SCNC: 12 MMOL/L (ref 9–18)
AST SERPL-CCNC: 38 U/L (ref 15–37)
BILIRUB SERPL-MCNC: 0.3 MG/DL (ref 0–1.2)
BUN SERPL-MCNC: 13 MG/DL (ref 8–23)
CALCIUM SERPL-MCNC: 9.2 MG/DL (ref 8.8–10.2)
CHLORIDE SERPL-SCNC: 99 MMOL/L (ref 98–107)
CHOLEST SERPL-MCNC: 114 MG/DL (ref 0–200)
CO2 SERPL-SCNC: 28 MMOL/L (ref 20–28)
CREAT SERPL-MCNC: 0.92 MG/DL (ref 0.8–1.3)
EST. AVERAGE GLUCOSE BLD GHB EST-MCNC: 231 MG/DL
GLOBULIN SER CALC-MCNC: 3.1 G/DL (ref 2.3–3.5)
GLUCOSE SERPL-MCNC: 310 MG/DL (ref 70–99)
HBA1C MFR BLD: 9.7 % (ref 0–5.6)
HDLC SERPL-MCNC: 30 MG/DL (ref 40–60)
HDLC SERPL: 3.9 (ref 0–5)
LDLC SERPL CALC-MCNC: 29 MG/DL (ref 0–100)
MAGNESIUM SERPL-MCNC: 1.4 MG/DL (ref 1.8–2.4)
POTASSIUM SERPL-SCNC: 4.5 MMOL/L (ref 3.5–5.1)
PROT SERPL-MCNC: 6.9 G/DL (ref 6.3–8.2)
SODIUM SERPL-SCNC: 139 MMOL/L (ref 136–145)
TRIGL SERPL-MCNC: 276 MG/DL (ref 0–150)
VLDLC SERPL CALC-MCNC: 55 MG/DL (ref 6–23)

## 2024-09-25 RX ORDER — MAGNESIUM OXIDE 400 MG/1
400 TABLET ORAL DAILY
Qty: 90 TABLET | Refills: 1 | Status: SHIPPED | OUTPATIENT
Start: 2024-09-25

## 2024-10-01 ENCOUNTER — TELEPHONE (OUTPATIENT)
Dept: FAMILY MEDICINE CLINIC | Facility: CLINIC | Age: 69
End: 2024-10-01

## (undated) DEVICE — TISSUE FACE KLNX 9X8IN --

## (undated) DEVICE — SOLUTION MNOMTR 80ML ES HI VISC FOR VISCOUS SWALLOW DONE

## (undated) DEVICE — 3M™ TRANSPORE™ WHITE SURGICAL TAPE 1534-1, 1 INCH X 10 YARD (2,5CM X 9,1M), 12 ROLLS/CARTON 10 CARTONS/CASE: Brand: 3M™ TRANSPORE™

## (undated) DEVICE — CUP MED 1OZ CLR POLYPR FEED GRAD W/O LID

## (undated) DEVICE — SYRINGE CATH TIP 50ML

## (undated) DEVICE — SWABSTICK MEDICATED ADH PREP BDINE TINC BENZ

## (undated) DEVICE — SYR 3ML LL TIP 1/10ML GRAD --

## (undated) DEVICE — TO REMOVE ADHESIVE TAPE FROM SKIN.: Brand: PDI® ADHESIVE TAPE REMOVER PAD

## (undated) DEVICE — BASIN EMESIS 500CC ROSE 250/CS 60/PLT: Brand: MEDEGEN MEDICAL PRODUCTS, LLC

## (undated) DEVICE — PROBE PH GAST 6.4FR 18CM PH 2 0CM 15 CHANNELS ES IMPED N